# Patient Record
Sex: MALE | Race: WHITE | Employment: OTHER | URBAN - METROPOLITAN AREA
[De-identification: names, ages, dates, MRNs, and addresses within clinical notes are randomized per-mention and may not be internally consistent; named-entity substitution may affect disease eponyms.]

---

## 2021-12-03 DIAGNOSIS — G89.29 OTHER CHRONIC PAIN: Primary | ICD-10-CM

## 2021-12-03 RX ORDER — OXYCODONE HYDROCHLORIDE AND ACETAMINOPHEN 5; 325 MG/1; MG/1
1 TABLET ORAL EVERY 6 HOURS PRN
Qty: 12 TABLET | Refills: 0 | OUTPATIENT
Start: 2021-12-03 | End: 2021-12-06

## 2021-12-10 NOTE — TELEPHONE ENCOUNTER
This is a nursing home patient they dont go thru system and this pharmacy is nursing home only doesnot accept scrits

## 2022-11-23 ENCOUNTER — APPOINTMENT (OUTPATIENT)
Dept: GENERAL RADIOLOGY | Age: 77
DRG: 480 | End: 2022-11-23
Payer: MEDICARE

## 2022-11-23 ENCOUNTER — APPOINTMENT (OUTPATIENT)
Dept: CT IMAGING | Age: 77
DRG: 480 | End: 2022-11-23
Payer: MEDICARE

## 2022-11-23 ENCOUNTER — HOSPITAL ENCOUNTER (INPATIENT)
Age: 77
LOS: 12 days | Discharge: HOSPICE | DRG: 480 | End: 2022-12-05
Attending: EMERGENCY MEDICINE | Admitting: FAMILY MEDICINE
Payer: MEDICARE
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
## 2022-11-23 DIAGNOSIS — Z51.5 HOSPICE CARE PATIENT: ICD-10-CM

## 2022-11-23 DIAGNOSIS — S72.451A: Primary | ICD-10-CM

## 2022-11-23 PROBLEM — S72.8X1A OTHER FRACTURE OF RIGHT FEMUR, INITIAL ENCOUNTER FOR CLOSED FRACTURE (HCC): Status: ACTIVE | Noted: 2022-11-23

## 2022-11-23 PROCEDURE — 93005 ELECTROCARDIOGRAM TRACING: CPT | Performed by: FAMILY MEDICINE

## 2022-11-23 PROCEDURE — 99285 EMERGENCY DEPT VISIT HI MDM: CPT

## 2022-11-23 PROCEDURE — 96375 TX/PRO/DX INJ NEW DRUG ADDON: CPT

## 2022-11-23 PROCEDURE — 73552 X-RAY EXAM OF FEMUR 2/>: CPT

## 2022-11-23 PROCEDURE — 71045 X-RAY EXAM CHEST 1 VIEW: CPT

## 2022-11-23 PROCEDURE — 2580000003 HC RX 258: Performed by: EMERGENCY MEDICINE

## 2022-11-23 PROCEDURE — 96374 THER/PROPH/DIAG INJ IV PUSH: CPT

## 2022-11-23 PROCEDURE — 6360000002 HC RX W HCPCS: Performed by: EMERGENCY MEDICINE

## 2022-11-23 PROCEDURE — 1200000000 HC SEMI PRIVATE

## 2022-11-23 PROCEDURE — 73560 X-RAY EXAM OF KNEE 1 OR 2: CPT

## 2022-11-23 PROCEDURE — 73700 CT LOWER EXTREMITY W/O DYE: CPT

## 2022-11-23 RX ORDER — SULFAMETHOXAZOLE AND TRIMETHOPRIM 800; 160 MG/1; MG/1
1 TABLET ORAL 2 TIMES DAILY
Status: ON HOLD | COMMUNITY
End: 2022-12-05 | Stop reason: HOSPADM

## 2022-11-23 RX ORDER — OMEPRAZOLE 20 MG/1
20 CAPSULE, DELAYED RELEASE ORAL DAILY
Status: ON HOLD | COMMUNITY
End: 2022-12-05 | Stop reason: HOSPADM

## 2022-11-23 RX ORDER — VENLAFAXINE 50 MG/1
300 TABLET ORAL DAILY
Status: ON HOLD | COMMUNITY
End: 2022-12-05 | Stop reason: HOSPADM

## 2022-11-23 RX ORDER — FUROSEMIDE 20 MG/1
20 TABLET ORAL DAILY
Status: ON HOLD | COMMUNITY
End: 2022-12-05 | Stop reason: HOSPADM

## 2022-11-23 RX ORDER — FENTANYL CITRATE 50 UG/ML
25 INJECTION, SOLUTION INTRAMUSCULAR; INTRAVENOUS ONCE
Status: COMPLETED | OUTPATIENT
Start: 2022-11-23 | End: 2022-11-23

## 2022-11-23 RX ORDER — MORPHINE SULFATE 4 MG/ML
4 INJECTION, SOLUTION INTRAMUSCULAR; INTRAVENOUS ONCE
Status: DISCONTINUED | OUTPATIENT
Start: 2022-11-23 | End: 2022-11-23

## 2022-11-23 RX ORDER — ASPIRIN 325 MG
325 TABLET ORAL DAILY
Status: ON HOLD | COMMUNITY
End: 2022-11-25 | Stop reason: HOSPADM

## 2022-11-23 RX ORDER — ONDANSETRON 2 MG/ML
4 INJECTION INTRAMUSCULAR; INTRAVENOUS ONCE
Status: COMPLETED | OUTPATIENT
Start: 2022-11-23 | End: 2022-11-23

## 2022-11-23 RX ORDER — OXYCODONE HYDROCHLORIDE AND ACETAMINOPHEN 5; 325 MG/1; MG/1
1 TABLET ORAL EVERY 6 HOURS PRN
Status: ON HOLD | COMMUNITY
End: 2022-11-25 | Stop reason: HOSPADM

## 2022-11-23 RX ORDER — 0.9 % SODIUM CHLORIDE 0.9 %
1000 INTRAVENOUS SOLUTION INTRAVENOUS ONCE
Status: COMPLETED | OUTPATIENT
Start: 2022-11-23 | End: 2022-11-23

## 2022-11-23 RX ORDER — CLINDAMYCIN PHOSPHATE 900 MG/50ML
900 INJECTION INTRAVENOUS SEE ADMIN INSTRUCTIONS
Status: DISCONTINUED | OUTPATIENT
Start: 2022-11-23 | End: 2022-11-25 | Stop reason: SDUPTHER

## 2022-11-23 RX ORDER — ACETAMINOPHEN 325 MG/1
650 TABLET ORAL EVERY 4 HOURS PRN
Status: ON HOLD | COMMUNITY
End: 2022-11-25 | Stop reason: HOSPADM

## 2022-11-23 RX ORDER — CARVEDILOL 12.5 MG/1
12.5 TABLET ORAL DAILY
Status: ON HOLD | COMMUNITY
End: 2022-12-05 | Stop reason: HOSPADM

## 2022-11-23 RX ORDER — POTASSIUM CHLORIDE 750 MG/1
10 TABLET, EXTENDED RELEASE ORAL DAILY
Status: ON HOLD | COMMUNITY
End: 2022-12-05 | Stop reason: HOSPADM

## 2022-11-23 RX ORDER — LISINOPRIL 5 MG/1
5 TABLET ORAL DAILY
Status: ON HOLD | COMMUNITY
End: 2022-12-05 | Stop reason: HOSPADM

## 2022-11-23 RX ORDER — LAMOTRIGINE 200 MG/1
200 TABLET ORAL DAILY
Status: ON HOLD | COMMUNITY
End: 2022-12-05 | Stop reason: HOSPADM

## 2022-11-23 RX ADMIN — ONDANSETRON 4 MG: 2 INJECTION INTRAMUSCULAR; INTRAVENOUS at 21:26

## 2022-11-23 RX ADMIN — SODIUM CHLORIDE 1000 ML: 9 INJECTION, SOLUTION INTRAVENOUS at 21:36

## 2022-11-23 RX ADMIN — FENTANYL CITRATE 25 MCG: 50 INJECTION INTRAMUSCULAR; INTRAVENOUS at 21:35

## 2022-11-23 ASSESSMENT — PAIN DESCRIPTION - ORIENTATION
ORIENTATION: LEFT
ORIENTATION: LEFT
ORIENTATION: RIGHT

## 2022-11-23 ASSESSMENT — PAIN SCALES - GENERAL
PAINLEVEL_OUTOF10: 8
PAINLEVEL_OUTOF10: 7
PAINLEVEL_OUTOF10: 9

## 2022-11-23 ASSESSMENT — PAIN DESCRIPTION - LOCATION
LOCATION: LEG
LOCATION: LEG
LOCATION: KNEE

## 2022-11-23 ASSESSMENT — PAIN DESCRIPTION - DESCRIPTORS: DESCRIPTORS: ACHING

## 2022-11-23 ASSESSMENT — PAIN DESCRIPTION - FREQUENCY: FREQUENCY: CONTINUOUS

## 2022-11-23 ASSESSMENT — PAIN DESCRIPTION - PAIN TYPE: TYPE: ACUTE PAIN

## 2022-11-23 ASSESSMENT — PAIN DESCRIPTION - ONSET: ONSET: SUDDEN

## 2022-11-23 ASSESSMENT — PAIN - FUNCTIONAL ASSESSMENT: PAIN_FUNCTIONAL_ASSESSMENT: 0-10

## 2022-11-23 NOTE — LETTER
[place] Department Medicaid  CERTIFICATION OF NECESSITY  FOR NON-EMERGENCY TRANSPORTATION   BY GROUND AMBULANCE      Individual Information   1. Name: Lexus Hudson 2. [place] Medicaid Billing Number:    3. Address: [address] [address] [address] [address]      Transportation Provider Information   4. Provider Name: Ernesto Soto    5. [place] Provider Number:  National Provider Identifier (NPI):      Certification  7. Criteria:  During transport, this individual requires:  [x] Medical treatment or continuous     supervision by an EMT. [] The administration or regulation of oxygen by another person. [] Supervised protective restraint. 8. Period Beginning Date: 12/05/22   9. Length  [x] Not more than 1 day(s)  [] One Year     Additional Information Relevant to Certification   10. Comments or Explanations, If Necessary or Appropriate   Fracture right femur with repair/gait instability/ambulatory dysfunction/     Certifying Practitioner Information   11. Name of Practitioner: dr Mami Cr   12. [place] Medicaid Provider Number, If Applicable:  [address] Provider Identifier (NPI):      Signature Information   14. Date of Signature: 12/05/22 15. Name of Person Signing: Erika field rn     16. Signature and Professional Designation: Dr DELVIN Powell Discharge planner      [facility] [id number]  Rev. 7/2015       [address] [place] [address] Encounter Date/Time: 11/23/2022 2011    Hospital Account: [de-identified]    MRN: [id number]    Patient: Lexus Hudson    Contact Serial #: [id number]      ENCOUNTER          Patient Class: I Private Enc?   No Unit  BD: SEYZ [id number]   Hospital Service: MED   Encounter DX: Other fracture of right *   ADM Provider: Andrew Beltrán DO   Procedure:     ATT Provider: Bryan Shaffer MD   REF Provider:        Admission DX: Other fracture of right femur, initial encounter for closed fracture ([facility] [address] Fall from standing, initial encounter, Supracondyl fx femur-closed, right, [facility] and DX codes: S42.3D9K, [id number]. Virgil Pulliamtamy      PATIENT                 Name: Macie Lopes : 1945 (77 yrs)   Address: [address]  Sex: Male   [place] city: [facility] [address] [address]         Marital Status:    Employer: RETIRED         Christian: Sikhism   Primary Care Provider: Davey Dunn DO         Primary Phone: [phone]   EMERGENCY CONTACT   Contact Name Legal Guardian? Relationship to Patient Home Phone Work Phone   1. Dayana Smith  2. *No Contact Specified* No    Spouse                      GUARANTOR            Guarantor: Macie Lopes     : 1945   Address: [address]  Sex: Male     [place] [address]     Relation to Patient: Self       Home Phone: [phone]   Guarantor ID: [id number]       Work Phone:     Guarantor Employer: RETIRED         Status: RETIRED      COVERAGE        PRIMARY INSURANCE   Payor: AET MEDICARE Plan: [address] [place] [address]*   Payor Address: [facility] [id number], [place] [address]       Group Number: [id number] Insurance Type: [id number] Name: Syed Garland : 1945   Subscriber ID: [id number] Pat. Rel. to Sub: Self   SECONDARY INSURANCE   Payor:   Plan:     Payor Address:  ,           Group Number:   Insurance Type:     Subscriber Name:   Subscriber :     Subscriber ID:   Pat.  Rel. to Sub:           CSN: [id number]

## 2022-11-24 LAB
ABO/RH: NORMAL
ALBUMIN SERPL-MCNC: 3.4 G/DL (ref 3.5–5.2)
ALP BLD-CCNC: 100 U/L (ref 40–129)
ALT SERPL-CCNC: 19 U/L (ref 0–40)
ANION GAP SERPL CALCULATED.3IONS-SCNC: 10 MMOL/L (ref 7–16)
ANTIBODY SCREEN: NORMAL
APTT: 28.7 SEC (ref 24.5–35.1)
AST SERPL-CCNC: 19 U/L (ref 0–39)
BASOPHILS ABSOLUTE: 0.1 E9/L (ref 0–0.2)
BASOPHILS RELATIVE PERCENT: 0.9 % (ref 0–2)
BILIRUB SERPL-MCNC: 0.5 MG/DL (ref 0–1.2)
BUN BLDV-MCNC: 15 MG/DL (ref 6–23)
CALCIUM SERPL-MCNC: 9.2 MG/DL (ref 8.6–10.2)
CHLORIDE BLD-SCNC: 105 MMOL/L (ref 98–107)
CO2: 27 MMOL/L (ref 22–29)
CREAT SERPL-MCNC: 2 MG/DL (ref 0.7–1.2)
EOSINOPHILS ABSOLUTE: 0.1 E9/L (ref 0.05–0.5)
EOSINOPHILS RELATIVE PERCENT: 0.9 % (ref 0–6)
GFR SERPL CREATININE-BSD FRML MDRD: 33 ML/MIN/1.73
GLUCOSE BLD-MCNC: 200 MG/DL (ref 74–99)
HCT VFR BLD CALC: 42 % (ref 37–54)
HEMOGLOBIN: 13.2 G/DL (ref 12.5–16.5)
INR BLD: 1.2
LYMPHOCYTES ABSOLUTE: 1.62 E9/L (ref 1.5–4)
LYMPHOCYTES RELATIVE PERCENT: 14.8 % (ref 20–42)
MAGNESIUM: 2.3 MG/DL (ref 1.6–2.6)
MCH RBC QN AUTO: 27.7 PG (ref 26–35)
MCHC RBC AUTO-ENTMCNC: 31.4 % (ref 32–34.5)
MCV RBC AUTO: 88.1 FL (ref 80–99.9)
MONOCYTES ABSOLUTE: 1.4 E9/L (ref 0.1–0.95)
MONOCYTES RELATIVE PERCENT: 13 % (ref 2–12)
NEUTROPHILS ABSOLUTE: 7.56 E9/L (ref 1.8–7.3)
NEUTROPHILS RELATIVE PERCENT: 70.4 % (ref 43–80)
PDW BLD-RTO: 17.4 FL (ref 11.5–15)
PLATELET # BLD: 156 E9/L (ref 130–450)
PMV BLD AUTO: 11.3 FL (ref 7–12)
POTASSIUM REFLEX MAGNESIUM: 3.4 MMOL/L (ref 3.5–5)
PROTHROMBIN TIME: 12.6 SEC (ref 9.3–12.4)
RBC # BLD: 4.77 E12/L (ref 3.8–5.8)
SODIUM BLD-SCNC: 142 MMOL/L (ref 132–146)
TOTAL PROTEIN: 6.5 G/DL (ref 6.4–8.3)
WBC # BLD: 10.8 E9/L (ref 4.5–11.5)

## 2022-11-24 PROCEDURE — 1200000000 HC SEMI PRIVATE

## 2022-11-24 PROCEDURE — 85610 PROTHROMBIN TIME: CPT

## 2022-11-24 PROCEDURE — 6360000002 HC RX W HCPCS: Performed by: FAMILY MEDICINE

## 2022-11-24 PROCEDURE — 6370000000 HC RX 637 (ALT 250 FOR IP): Performed by: FAMILY MEDICINE

## 2022-11-24 PROCEDURE — 6370000000 HC RX 637 (ALT 250 FOR IP): Performed by: INTERNAL MEDICINE

## 2022-11-24 PROCEDURE — 83735 ASSAY OF MAGNESIUM: CPT

## 2022-11-24 PROCEDURE — 86901 BLOOD TYPING SEROLOGIC RH(D): CPT

## 2022-11-24 PROCEDURE — 86850 RBC ANTIBODY SCREEN: CPT

## 2022-11-24 PROCEDURE — 36415 COLL VENOUS BLD VENIPUNCTURE: CPT

## 2022-11-24 PROCEDURE — 86900 BLOOD TYPING SEROLOGIC ABO: CPT

## 2022-11-24 PROCEDURE — 2580000003 HC RX 258: Performed by: FAMILY MEDICINE

## 2022-11-24 PROCEDURE — 2700000000 HC OXYGEN THERAPY PER DAY

## 2022-11-24 PROCEDURE — 80053 COMPREHEN METABOLIC PANEL: CPT

## 2022-11-24 PROCEDURE — 85730 THROMBOPLASTIN TIME PARTIAL: CPT

## 2022-11-24 PROCEDURE — 85025 COMPLETE CBC W/AUTO DIFF WBC: CPT

## 2022-11-24 RX ORDER — TROSPIUM CHLORIDE 20 MG/1
20 TABLET, FILM COATED ORAL NIGHTLY
Status: DISCONTINUED | OUTPATIENT
Start: 2022-11-24 | End: 2022-12-05 | Stop reason: HOSPADM

## 2022-11-24 RX ORDER — POTASSIUM CHLORIDE 20 MEQ/1
20 TABLET, EXTENDED RELEASE ORAL 2 TIMES DAILY WITH MEALS
Status: DISCONTINUED | OUTPATIENT
Start: 2022-11-24 | End: 2022-12-05 | Stop reason: HOSPADM

## 2022-11-24 RX ORDER — FENTANYL CITRATE 50 UG/ML
50 INJECTION, SOLUTION INTRAMUSCULAR; INTRAVENOUS
Status: DISCONTINUED | OUTPATIENT
Start: 2022-11-24 | End: 2022-11-25

## 2022-11-24 RX ORDER — ONDANSETRON 2 MG/ML
4 INJECTION INTRAMUSCULAR; INTRAVENOUS EVERY 6 HOURS PRN
Status: DISCONTINUED | OUTPATIENT
Start: 2022-11-24 | End: 2022-12-01

## 2022-11-24 RX ORDER — SODIUM CHLORIDE 0.9 % (FLUSH) 0.9 %
10 SYRINGE (ML) INJECTION EVERY 12 HOURS SCHEDULED
Status: DISCONTINUED | OUTPATIENT
Start: 2022-11-24 | End: 2022-12-05 | Stop reason: HOSPADM

## 2022-11-24 RX ORDER — LAMOTRIGINE 100 MG/1
200 TABLET ORAL DAILY
Status: DISCONTINUED | OUTPATIENT
Start: 2022-11-24 | End: 2022-12-05 | Stop reason: HOSPADM

## 2022-11-24 RX ORDER — FUROSEMIDE 20 MG/1
20 TABLET ORAL DAILY
Status: DISCONTINUED | OUTPATIENT
Start: 2022-11-24 | End: 2022-12-05 | Stop reason: HOSPADM

## 2022-11-24 RX ORDER — SODIUM CHLORIDE 9 MG/ML
INJECTION, SOLUTION INTRAVENOUS PRN
Status: DISCONTINUED | OUTPATIENT
Start: 2022-11-24 | End: 2022-12-05 | Stop reason: HOSPADM

## 2022-11-24 RX ORDER — VENLAFAXINE 75 MG/1
300 TABLET ORAL DAILY
Status: DISCONTINUED | OUTPATIENT
Start: 2022-11-24 | End: 2022-12-05 | Stop reason: HOSPADM

## 2022-11-24 RX ORDER — POLYETHYLENE GLYCOL 3350 17 G/17G
17 POWDER, FOR SOLUTION ORAL DAILY PRN
Status: DISCONTINUED | OUTPATIENT
Start: 2022-11-24 | End: 2022-12-05 | Stop reason: HOSPADM

## 2022-11-24 RX ORDER — LISINOPRIL 5 MG/1
5 TABLET ORAL DAILY
Status: DISCONTINUED | OUTPATIENT
Start: 2022-11-24 | End: 2022-12-05 | Stop reason: HOSPADM

## 2022-11-24 RX ORDER — PANTOPRAZOLE SODIUM 40 MG/1
40 TABLET, DELAYED RELEASE ORAL
Status: DISCONTINUED | OUTPATIENT
Start: 2022-11-24 | End: 2022-12-05 | Stop reason: HOSPADM

## 2022-11-24 RX ORDER — ACETAMINOPHEN 325 MG/1
650 TABLET ORAL EVERY 6 HOURS PRN
Status: DISCONTINUED | OUTPATIENT
Start: 2022-11-24 | End: 2022-12-05 | Stop reason: HOSPADM

## 2022-11-24 RX ORDER — ACETAMINOPHEN 650 MG/1
650 SUPPOSITORY RECTAL EVERY 6 HOURS PRN
Status: DISCONTINUED | OUTPATIENT
Start: 2022-11-24 | End: 2022-12-05 | Stop reason: HOSPADM

## 2022-11-24 RX ORDER — SODIUM CHLORIDE 0.9 % (FLUSH) 0.9 %
10 SYRINGE (ML) INJECTION PRN
Status: DISCONTINUED | OUTPATIENT
Start: 2022-11-24 | End: 2022-12-05 | Stop reason: HOSPADM

## 2022-11-24 RX ORDER — CARVEDILOL 6.25 MG/1
12.5 TABLET ORAL DAILY
Status: DISCONTINUED | OUTPATIENT
Start: 2022-11-24 | End: 2022-12-05 | Stop reason: HOSPADM

## 2022-11-24 RX ORDER — PROMETHAZINE HYDROCHLORIDE 12.5 MG/1
12.5 TABLET ORAL EVERY 6 HOURS PRN
Status: DISCONTINUED | OUTPATIENT
Start: 2022-11-24 | End: 2022-12-01

## 2022-11-24 RX ORDER — OXYCODONE HYDROCHLORIDE AND ACETAMINOPHEN 5; 325 MG/1; MG/1
1 TABLET ORAL EVERY 6 HOURS PRN
Status: DISCONTINUED | OUTPATIENT
Start: 2022-11-24 | End: 2022-11-25 | Stop reason: SDUPTHER

## 2022-11-24 RX ORDER — SODIUM CHLORIDE 9 MG/ML
INJECTION, SOLUTION INTRAVENOUS CONTINUOUS
Status: DISCONTINUED | OUTPATIENT
Start: 2022-11-24 | End: 2022-11-30

## 2022-11-24 RX ADMIN — SODIUM CHLORIDE: 9 INJECTION, SOLUTION INTRAVENOUS at 01:41

## 2022-11-24 RX ADMIN — FENTANYL CITRATE 50 MCG: 50 INJECTION INTRAMUSCULAR; INTRAVENOUS at 04:04

## 2022-11-24 RX ADMIN — FENTANYL CITRATE 50 MCG: 50 INJECTION INTRAMUSCULAR; INTRAVENOUS at 20:05

## 2022-11-24 RX ADMIN — FENTANYL CITRATE 50 MCG: 50 INJECTION INTRAMUSCULAR; INTRAVENOUS at 01:34

## 2022-11-24 RX ADMIN — VENLAFAXINE 300 MG: 75 TABLET ORAL at 12:32

## 2022-11-24 RX ADMIN — TROSPIUM CHLORIDE 20 MG: 20 TABLET, FILM COATED ORAL at 20:06

## 2022-11-24 RX ADMIN — LAMOTRIGINE 200 MG: 100 TABLET ORAL at 08:07

## 2022-11-24 RX ADMIN — FUROSEMIDE 20 MG: 20 TABLET ORAL at 08:06

## 2022-11-24 RX ADMIN — SODIUM CHLORIDE, PRESERVATIVE FREE 10 ML: 5 INJECTION INTRAVENOUS at 20:06

## 2022-11-24 RX ADMIN — CARVEDILOL 12.5 MG: 6.25 TABLET, FILM COATED ORAL at 08:05

## 2022-11-24 RX ADMIN — OXYCODONE AND ACETAMINOPHEN 1 TABLET: 5; 325 TABLET ORAL at 15:03

## 2022-11-24 RX ADMIN — FENTANYL CITRATE 50 MCG: 50 INJECTION INTRAMUSCULAR; INTRAVENOUS at 06:15

## 2022-11-24 RX ADMIN — FENTANYL CITRATE 50 MCG: 50 INJECTION INTRAMUSCULAR; INTRAVENOUS at 16:21

## 2022-11-24 RX ADMIN — POTASSIUM CHLORIDE 20 MEQ: 20 TABLET, EXTENDED RELEASE ORAL at 15:03

## 2022-11-24 RX ADMIN — OXYCODONE AND ACETAMINOPHEN 1 TABLET: 5; 325 TABLET ORAL at 08:08

## 2022-11-24 RX ADMIN — POTASSIUM CHLORIDE 20 MEQ: 20 TABLET, EXTENDED RELEASE ORAL at 12:31

## 2022-11-24 RX ADMIN — LISINOPRIL 5 MG: 5 TABLET ORAL at 08:05

## 2022-11-24 RX ADMIN — PANTOPRAZOLE SODIUM 40 MG: 40 TABLET, DELAYED RELEASE ORAL at 06:17

## 2022-11-24 RX ADMIN — ACETAMINOPHEN 650 MG: 325 TABLET ORAL at 13:18

## 2022-11-24 ASSESSMENT — PAIN DESCRIPTION - LOCATION
LOCATION: KNEE
LOCATION: LEG
LOCATION: KNEE

## 2022-11-24 ASSESSMENT — PAIN DESCRIPTION - ORIENTATION
ORIENTATION: RIGHT

## 2022-11-24 ASSESSMENT — PAIN SCALES - GENERAL
PAINLEVEL_OUTOF10: 8
PAINLEVEL_OUTOF10: 10
PAINLEVEL_OUTOF10: 8
PAINLEVEL_OUTOF10: 10
PAINLEVEL_OUTOF10: 8
PAINLEVEL_OUTOF10: 9
PAINLEVEL_OUTOF10: 8
PAINLEVEL_OUTOF10: 9
PAINLEVEL_OUTOF10: 8

## 2022-11-24 ASSESSMENT — PAIN DESCRIPTION - DESCRIPTORS
DESCRIPTORS: STABBING
DESCRIPTORS: SPASM
DESCRIPTORS: ACHING;DISCOMFORT;SORE
DESCRIPTORS: ACHING;CRAMPING;SHARP
DESCRIPTORS: BURNING;CRAMPING;SHARP
DESCRIPTORS: SHARP

## 2022-11-24 ASSESSMENT — PAIN - FUNCTIONAL ASSESSMENT
PAIN_FUNCTIONAL_ASSESSMENT: PREVENTS OR INTERFERES WITH ALL ACTIVE AND SOME PASSIVE ACTIVITIES

## 2022-11-24 NOTE — PROGRESS NOTES
Department of Orthopedic Surgery  Resident Progress Note    Patient seen and examined. Pain controlled. No new complaints. Denies chest pain, shortness of breath, dizziness/lightheadedness. Spoke with patient about plans for the OR. Currently the operating room is STAT cases only due to the Thanksgiving holiday.   Patient made aware and is okay with treatment plan    VITALS:  /75   Pulse 85   Temp 97.3 °F (36.3 °C) (Temporal)   Resp 20   Ht 6' 2\" (1.88 m)   Wt 212 lb (96.2 kg)   SpO2 100%   BMI 27.22 kg/m²     General: alert and oriented to person, place and time, well-developed and well-nourished, in no acute distress    MUSCULOSKELETAL:   right lower extremity:  Dressing C/D/I, knee immobilizer in place  Compartments soft and compressible  +PF/DF/EHL  +2/4 DP & PT pulses, Brisk Cap refill, Toes warm and perfused  Distal sensation grossly intact to Peroneals, Sural, Saphenous, and tibial nrs    CBC:   Lab Results   Component Value Date/Time    WBC 10.8 11/24/2022 05:28 AM    HGB 13.2 11/24/2022 05:28 AM    HCT 42.0 11/24/2022 05:28 AM     11/24/2022 05:28 AM     PT/INR:    Lab Results   Component Value Date/Time    PROTIME 12.6 11/24/2022 12:05 AM    INR 1.2 11/24/2022 12:05 AM           ASSESSMENT  Closed, right periprosthetic distal femur fracture    PLAN      Continue physical therapy and protocol: NWCLAUDIA - BECKY  Hold anti-coags  Plan for OR tomorrow, 11/25  Ice and elevate affected extremity  Pain Control: IV and PO  Monitor H&H  D/C Plan:  Pending

## 2022-11-24 NOTE — PROGRESS NOTES
Hospitalist Progress Note      SYNOPSIS: Patient admitted on 2022 for right knee pain following a fall from standing height. He was a transfer from outside hospital.    Patient is a 76 y.o. male with a past medical history of hypertension, depression, osteoporosis who presents with the chief complaint of right knee pain following a fall from standing height. Patient states that he was taking a step up from his living room to his kitchen and as he went to take a step he felt his knee buckle fell to the ground. Denies any his head denies loss of consciousness. He states he had immediate onset pain to the right knee and subsequently sought medical treatment at [facility]. Was found to have a periprosthetic distal femur fracture. Transferred to [facility] for definitive care and treatment. SUBJECTIVE:    Patient seen and examined in his room. Appears comfortable. States that pain is well controlled. Denies any chest pain, nausea, vomiting. Records reviewed. Stable overnight. No other overnight issues reported. Temp (24hrs), Av.9 °F (36.6 °C), Min:97.3 °F (36.3 °C), Max:98.5 °F (36.9 °C)    DIET: Diet NPO  CODE: Full Code    Intake/Output Summary (Last 24 hours) at 2022 8572  Last data filed at 2022 2303  Gross per 24 hour   Intake 1000 ml   Output --   Net 1000 ml       OBJECTIVE:    /75   Pulse 85   Temp 97.3 °F (36.3 °C) (Temporal)   Resp 20   Ht 6' 2\" (1.88 m)   Wt 212 lb (96.2 kg)   SpO2 100%   BMI 27.22 kg/m²     General appearance: No apparent distress, appears stated age and cooperative. HEENT:  Conjunctivae/corneas clear. Neck: Supple. No jugular venous distention.    Respiratory: Clear to auscultation bilaterally, normal respiratory effort  Cardiovascular: Regular rate rhythm, normal S1-S2  Abdomen: Soft, nontender, nondistended  Musculoskeletal: No clubbing, cyanosis, no bilateral lower extremity edema. Brisk capillary refill. Skin:  No rashes  on visible skin  Neurologic: awake, alert and following commands     ASSESSMENT & PLAN:    ASSESSMENT:  -closed right periprosthetic distal femur fracture  -Hypertension  -Hyperlipidemia  -GERD  -Elevated creatinine, likely CKD, monitor        PLAN:  -Orthopedics following  -N.p.o. on the night of 11/24, plan for OR on 11/25  -Normal saline 75 mL/h  -Pain management  -Continue home medications          DISPOSITION:   Unclear discharge disposition, will be decided after the surgery and PT/OT evaluation. Medications:  REVIEWED DAILY    Infusion Medications    sodium chloride      sodium chloride 75 mL/hr at 11/24/22 0141     Scheduled Medications    carvedilol  12.5 mg Oral Daily    furosemide  20 mg Oral Daily    lamoTRIgine  200 mg Oral Daily    lisinopril  5 mg Oral Daily    pantoprazole  40 mg Oral QAM AC    venlafaxine  300 mg Oral Daily    trospium  20 mg Oral Nightly    sodium chloride flush  10 mL IntraVENous 2 times per day    clindamycin (CLEOCIN) IV  900 mg IntraVENous See Admin Instructions     PRN Meds: fentanNYL, oxyCODONE-acetaminophen, sodium chloride flush, sodium chloride, promethazine **OR** ondansetron, polyethylene glycol, acetaminophen **OR** acetaminophen    Labs:     Recent Labs     11/24/22  0528   WBC 10.8   HGB 13.2   HCT 42.0          Recent Labs     11/24/22  0528      K 3.4*      CO2 27   BUN 15   CREATININE 2.0*   CALCIUM 9.2       Recent Labs     11/24/22  0528   PROT 6.5   ALKPHOS 100   ALT 19   AST 19   BILITOT 0.5       Recent Labs     11/24/22  0005   INR 1.2       No results for input(s): Dena Quevedo in the last 72 hours.     Chronic labs:    Lab Results   Component Value Date    INR 1.2 11/24/2022       Radiology: REVIEWED DAILY    +++++++++++++++++++++++++++++++++++++++++++++++++  MD Toni King Physician - [address], [place]  +++++++++++++++++++++++++++++++++++++++++++++++++  NOTE: This report was transcribed using voice recognition software. Every effort was made to ensure accuracy; however, inadvertent computerized transcription errors may be present.

## 2022-11-24 NOTE — H&P
Hospitalist History & Physical      PCP: Ángel Mina DO    Date of Service: Pt seen/examined on 11/23/2022     Chief Complaint:  had concerns including Knee Pain (Transfer from [place]. Right knee gave out earlier. Knee swollen. Xray done distal femur fx, arrived with knee immobilizer on). History Of Present Illness:    Mr. Rock Jane, a 76y.o. year old male  who  has no past medical history on file. Patient is a 76 y.o. male with a past medical history of hypertension, depression, osteoporosis who presents with the chief complaint of right knee pain following a fall from standing height. Patient states that he was taking a step up from his living room to his kitchen and as he went to take a step he felt his knee buckle fell to the ground. Denies any his head denies loss of consciousness. He states he had immediate onset pain to the right knee and subsequently sought medical treatment at [facility]. Was found to have a periprosthetic distal femur fracture. Transferred to [facility] for definitive care and treatment. No past medical history on file. No past surgical history on file. Prior to Admission medications    Medication Sig Start Date End Date Taking?  Authorizing Provider   carvedilol (COREG) 12.5 MG tablet Take 12.5 mg by mouth daily   Yes Historical Provider, MD   omeprazole (PRILOSEC) 20 MG delayed release capsule Take 20 mg by mouth daily   Yes Historical Provider, MD   venlafaxine (EFFEXOR) 50 MG tablet Take 300 mg by mouth daily   Yes Historical Provider, MD   lisinopril (PRINIVIL;ZESTRIL) 5 MG tablet Take 5 mg by mouth daily   Yes Historical Provider, MD   furosemide (LASIX) 20 MG tablet Take 20 mg by mouth daily   Yes Historical Provider, MD   potassium chloride (KLOR-CON M) 10 MEQ extended release tablet Take 10 mEq by mouth daily   Yes Historical Provider, MD   acetaminophen (TYLENOL) 325 MG tablet Take 650 mg by mouth every 4 hours as needed for Pain   Yes Historical Provider, MD   aspirin 325 MG tablet Take 325 mg by mouth daily   Yes Historical Provider, MD   oxyCODONE-acetaminophen (PERCOCET) 5-325 MG per tablet Take 1 tablet by mouth every 6 hours as needed for Pain. Yes Historical Provider, MD   sulfamethoxazole-trimethoprim (BACTRIM DS;SEPTRA DS) 800-160 MG per tablet Take 1 tablet by mouth 2 times daily End date 11-23-22   Yes Historical Provider, MD   Vibegron 75 MG TABS Take 75 mg by mouth daily   Yes Historical Provider, MD   lamoTRIgine (LAMICTAL) 200 MG tablet Take 200 mg by mouth daily   Yes Historical Provider, MD         Allergies:  Ceftriaxone    Social History:    TOBACCO:   reports that he has an unknown smoking status. He has never used smokeless tobacco.  ETOH:   has no history on file for alcohol use. Family History:    Reviewed in detail and negative for DM, CAD, Cancer, CVA. Positive as follows\"  No family history on file. REVIEW OF SYSTEMS:   Pertinent positives as noted in the HPI. All other systems reviewed and negative. PHYSICAL EXAM:  /78   Pulse 88   Temp 98.5 °F (36.9 °C) (Oral)   Resp 18   Ht 6' 2\" (1.88 m)   Wt 212 lb (96.2 kg)   SpO2 95%   BMI 27.22 kg/m²   General appearance: No apparent distress, appears stated age and cooperative. HEENT: Normal cephalic, atraumatic without obvious deformity. Pupils equal, round, and reactive to light. Extra ocular muscles intact. Conjunctivae/corneas clear. Neck: Supple, with full range of motion. No jugular venous distention. Trachea midline. Respiratory: CTA  Cardiovascular: RRR  Abdomen: S/NT/ND  Musculoskeletal: No clubbing, cyanosis, edema of bilateral lower extremities. Brisk capillary refill. Knee immobilizer in place on right knee  Skin: Normal skin color. No rashes or lesions. Neurologic:  Neurovascularly intact without any focal sensory/motor deficits.  Cranial nerves: II-XII intact, grossly non-focal.  Psychiatric: Alert and oriented, thought content appropriate, normal insight    Reviewed EKG and CXR personally      CBC:   No results for input(s): WBC, RBC, HGB, HCT, MCV, RDW, PLT in the last 72 hours. BMP: No results for input(s): NA, K, CL, CO2, BUN, CREATININE, CA, MG, PHOS in the last 72 hours. LFT:  No results for input(s): PROT, ALB, ALKPHOS, ALT, AST, BILITOT, AMYLASE, LIPASE in the last 72 hours. CE:  No results for input(s): Ian Bigness in the last 72 hours. PT/INR: No results for input(s): INR, APTT in the last 72 hours. BNP: No results for input(s): BNP in the last 72 hours. ESR: No results found for: SEDRATE  CRP: No results found for: CRP  D Dimer: No results found for: DDIMER   Folate and B12: No results found for: [id number], No results found for: FOLATE  Lactic Acid: No results found for: LACTA  Thyroid Studies: No results found for: TSH, W2LXGTK, M8MLWCF, THYROIDAB    Oupatient labs:  No results found for: CHOL, TRIG, HDL, LDLCALC, TSH, PSA, INR, LABA1C    Urinalysis:  No results found for: NITRU, WBCUA, BACTERIA, RBCUA, BLOODU, SPECGRAV, GLUCOSEU    Imaging:  XR FEMUR RIGHT (MIN 2 VIEWS)    Result Date: 11/23/2022  EXAMINATION: XRAY VIEWS OF THE RIGHT FEMUR 11/23/2022 8:49 pm COMPARISON: None. HISTORY: ORDERING SYSTEM PROVIDED HISTORY: Fall from standing, reported distal periprosthetic femur fracture TECHNOLOGIST PROVIDED HISTORY: Reason for exam:->Fall from standing, reported distal periprosthetic femur fracture What reading provider will be dictating this exam?->CRC FINDINGS: Supracondylar fracture with apparent angulation and impaction is at the femur along the superior margin of knee replacement. Bone mineral density appears low. Hip replacement present. Angulated and impacted supracondylar fracture. XR KNEE RIGHT (1-2 VIEWS)    Result Date: 11/23/2022  EXAMINATION: TWO XRAY VIEWS OF THE RIGHT KNEE 11/23/2022 8:49 pm COMPARISON: None.  HISTORY: ORDERING SYSTEM PROVIDED HISTORY: periprosthetic distal femur fracture TECHNOLOGIST PROVIDED HISTORY: Reason for exam:->periprosthetic distal femur fracture What reading provider will be dictating this exam?->CRC FINDINGS: Supracondylar fracture with apparent angulation and impaction is at the femur along the superior margin of knee replacement. Bone mineral density appears low. Angulated and impacted supracondylar fracture. ASSESSMENT:  -Right femur fracture  -Hypertension  -Hyperlipidemia  -GERD      PLAN:  -Admit to medicine  -Orthopedics following  -N.p.o.  -Normal saline 75 mL/h  -Pain management  -Get EKG  -Continue home medications        Diet: No diet orders on file  Code Status: No Order  Surrogate decision maker confirmed with patient:   Extended Emergency Contact Information  Primary Emergency Contact: Dayana Smith  Mobile Phone: [phone]  Relation: Spouse  Preferred language: English   needed? No    DVT Prophylaxis: []Lovenox []Heparin []PCD [] [address] []Encouraged ambulation  Disposition: []Med/Surg [] Intermediate [] ICU/CCU  Admit status: [] Observation [] Inpatient     +++++++++++++++++++++++++++++++++++++++++++++++++  Katlyn Mantle, DO  +++++++++++++++++++++++++++++++++++++++++++++++++  NOTE: This report was transcribed using voice recognition software. Every effort was made to ensure accuracy; however, inadvertent computerized transcription errors may be present.

## 2022-11-24 NOTE — ED NOTES
Patient returned from [address] [place] [address],[facility]  11/23/22 0401

## 2022-11-24 NOTE — ED NOTES
Assumed care of pt at this time; when this nurse entered room patient had a cup of ice chips in his hand; connected to bp cuff and placed on monitor at this time; cws wnl to right leg; call bell in reach; will monitor     Alena Evans RN  11/23/22 2047       Alena Evans RN  11/23/22 2256

## 2022-11-24 NOTE — ED NOTES
Made physician aware of low bp and need to change pain medications     Aurora Breen RN  11/23/22 7767

## 2022-11-24 NOTE — CONSULTS
chills  EYES:  negative for acute vision changes  HEENT:  negative for cough, hearing loss  RESPIRATORY:  negative for  dyspnea, wheezing  CARDIOVASCULAR:  negative for  chest pain  GASTROINTESTINAL:  negative for nausea, vomiting  GENITOURINARY:  negative for frequency, positive for urinary incontinence  HEMATOLOGIC/LYMPHATIC:  negative for bleeding  MUSCULOSKELETAL:  positive for right knee pain and swelling  NEUROLOGICAL:  negative for headaches, dizziness  BEHAVIOR/PSYCH:  negative for increased agitation and anxiety    PHYSICAL EXAM:    VITALS:  There were no vitals taken for this visit. CONSTITUTIONAL:  awake, alert, cooperative, no apparent distress, and appears stated age  MUSCULOSKELETAL:  Right lower Extremity:  Skin examination reveals no superficial lacerations or abrasions. Large effusion noted to the right knee. No gross angular deformity noted about the right lower extremity. No erythema or ecchymosis at this time. TTP globally about the knee and distal femur. No tenderness palpation about the remainder of the right lower extremity. Sensation intact light touch distally in sural, saphenous, tibial, deep and superficial peroneal nerve distributions. Motor function grossly intact distally in tibial, deep and supers peroneal nerve distributions. Compartment soft and compressible  +2/4 DP and PT pulses, foot warm well fused, brisk capillary refill    Secondary Exam:   bilateralUE: No obvious signs of trauma. -TTP to fingers, hand, wrist, forearm, elbow, humerus, shoulder or clavicle. leftLE: No obvious signs of trauma. -TTP to foot, ankle, leg, knee, thigh, hip. Pelvis: -TTP, -Log roll, -Heel strike     DATA:    CBC: No results found for: WBC, RBC, HGB, HCT, MCV, MCH, MCHC, RDW, PLT, MPV  PT/INR:  No results found for: PROTIME, INR    Radiology Review:  XR 4 views of the right femur reviewed demonstrating a periprosthetic distal femur fracture.   Fracture line is oblique in nature extending from proximal to the anterior flange to just distal to the anterior flange. No significant comminution noted. CT scan of the right femur reviewed demonstrating the previously mentioned fracture pattern. No significant comminution is visualized. The implant does appear to be stable. Without any evidence of lucency about or tibial components. IMPRESSION:  Closed, right periprosthetic distal femur fracture    PLAN:  Nonweightbearing right lower extremity, maintain well-padded knee immobilizer  Multimodal pain control  N.p.o. midnight tomorrow, 11/25  Hold anticoagulants  Preoperative labs and imaging  Appreciate wright perioperative medical optimization by the medical team  Plan for open reduction internal fixation of right periprosthetic distal femur fracture Friday 11/25  All patient/family questions have been answered and patient is currently agreeable to plan.   Discussed with Attending      Electronically signed by Travis Mandujano DO on 11/23/2022 at 8:40 PM

## 2022-11-25 ENCOUNTER — ANESTHESIA EVENT (OUTPATIENT)
Dept: OPERATING ROOM | Age: 77
End: 2022-11-25
Payer: MEDICARE

## 2022-11-25 ENCOUNTER — APPOINTMENT (OUTPATIENT)
Dept: GENERAL RADIOLOGY | Age: 77
DRG: 480 | End: 2022-11-25
Payer: MEDICARE

## 2022-11-25 ENCOUNTER — ANESTHESIA (OUTPATIENT)
Dept: OPERATING ROOM | Age: 77
End: 2022-11-25
Payer: MEDICARE

## 2022-11-25 PROBLEM — S72.451A: Status: ACTIVE | Noted: 2022-11-25

## 2022-11-25 LAB
ANION GAP SERPL CALCULATED.3IONS-SCNC: 13 MMOL/L (ref 7–16)
BUN BLDV-MCNC: 21 MG/DL (ref 6–23)
CALCIUM SERPL-MCNC: 9.1 MG/DL (ref 8.6–10.2)
CHLORIDE BLD-SCNC: 102 MMOL/L (ref 98–107)
CO2: 24 MMOL/L (ref 22–29)
CREAT SERPL-MCNC: 2.6 MG/DL (ref 0.7–1.2)
EKG ATRIAL RATE: 68 BPM
EKG P AXIS: 94 DEGREES
EKG P-R INTERVAL: 204 MS
EKG Q-T INTERVAL: 464 MS
EKG QRS DURATION: 92 MS
EKG QTC CALCULATION (BAZETT): 493 MS
EKG R AXIS: 53 DEGREES
EKG T AXIS: 92 DEGREES
EKG VENTRICULAR RATE: 68 BPM
GFR SERPL CREATININE-BSD FRML MDRD: 24 ML/MIN/1.73
GLUCOSE BLD-MCNC: 160 MG/DL (ref 74–99)
HCT VFR BLD CALC: 38.1 % (ref 37–54)
HEMOGLOBIN: 12.4 G/DL (ref 12.5–16.5)
MCH RBC QN AUTO: 28.2 PG (ref 26–35)
MCHC RBC AUTO-ENTMCNC: 32.5 % (ref 32–34.5)
MCV RBC AUTO: 86.8 FL (ref 80–99.9)
PDW BLD-RTO: 17.4 FL (ref 11.5–15)
PLATELET # BLD: 129 E9/L (ref 130–450)
PMV BLD AUTO: 11.6 FL (ref 7–12)
POTASSIUM SERPL-SCNC: 4.2 MMOL/L (ref 3.5–5)
RBC # BLD: 4.39 E12/L (ref 3.8–5.8)
SODIUM BLD-SCNC: 139 MMOL/L (ref 132–146)
WBC # BLD: 14.2 E9/L (ref 4.5–11.5)

## 2022-11-25 PROCEDURE — 6370000000 HC RX 637 (ALT 250 FOR IP)

## 2022-11-25 PROCEDURE — 6370000000 HC RX 637 (ALT 250 FOR IP): Performed by: FAMILY MEDICINE

## 2022-11-25 PROCEDURE — 73552 X-RAY EXAM OF FEMUR 2/>: CPT

## 2022-11-25 PROCEDURE — 3600000005 HC SURGERY LEVEL 5 BASE: Performed by: STUDENT IN AN ORGANIZED HEALTH CARE EDUCATION/TRAINING PROGRAM

## 2022-11-25 PROCEDURE — C1776 JOINT DEVICE (IMPLANTABLE): HCPCS | Performed by: STUDENT IN AN ORGANIZED HEALTH CARE EDUCATION/TRAINING PROGRAM

## 2022-11-25 PROCEDURE — 2580000003 HC RX 258: Performed by: PHYSICAL THERAPY ASSISTANT

## 2022-11-25 PROCEDURE — C1769 GUIDE WIRE: HCPCS | Performed by: STUDENT IN AN ORGANIZED HEALTH CARE EDUCATION/TRAINING PROGRAM

## 2022-11-25 PROCEDURE — 3600000015 HC SURGERY LEVEL 5 ADDTL 15MIN: Performed by: STUDENT IN AN ORGANIZED HEALTH CARE EDUCATION/TRAINING PROGRAM

## 2022-11-25 PROCEDURE — 6370000000 HC RX 637 (ALT 250 FOR IP): Performed by: PHYSICAL THERAPY ASSISTANT

## 2022-11-25 PROCEDURE — 6360000002 HC RX W HCPCS: Performed by: ANESTHESIOLOGY

## 2022-11-25 PROCEDURE — 2700000000 HC OXYGEN THERAPY PER DAY

## 2022-11-25 PROCEDURE — 93010 ELECTROCARDIOGRAM REPORT: CPT | Performed by: INTERNAL MEDICINE

## 2022-11-25 PROCEDURE — 2500000003 HC RX 250 WO HCPCS

## 2022-11-25 PROCEDURE — 2500000003 HC RX 250 WO HCPCS: Performed by: PHYSICAL THERAPY ASSISTANT

## 2022-11-25 PROCEDURE — 6360000002 HC RX W HCPCS

## 2022-11-25 PROCEDURE — 6360000002 HC RX W HCPCS: Performed by: FAMILY MEDICINE

## 2022-11-25 PROCEDURE — 6370000000 HC RX 637 (ALT 250 FOR IP): Performed by: STUDENT IN AN ORGANIZED HEALTH CARE EDUCATION/TRAINING PROGRAM

## 2022-11-25 PROCEDURE — 1200000000 HC SEMI PRIVATE

## 2022-11-25 PROCEDURE — 2580000003 HC RX 258: Performed by: FAMILY MEDICINE

## 2022-11-25 PROCEDURE — 3700000000 HC ANESTHESIA ATTENDED CARE: Performed by: STUDENT IN AN ORGANIZED HEALTH CARE EDUCATION/TRAINING PROGRAM

## 2022-11-25 PROCEDURE — 2500000003 HC RX 250 WO HCPCS: Performed by: STUDENT IN AN ORGANIZED HEALTH CARE EDUCATION/TRAINING PROGRAM

## 2022-11-25 PROCEDURE — 3700000001 HC ADD 15 MINUTES (ANESTHESIA): Performed by: STUDENT IN AN ORGANIZED HEALTH CARE EDUCATION/TRAINING PROGRAM

## 2022-11-25 PROCEDURE — 0QSB04Z REPOSITION RIGHT LOWER FEMUR WITH INTERNAL FIXATION DEVICE, OPEN APPROACH: ICD-10-PCS | Performed by: STUDENT IN AN ORGANIZED HEALTH CARE EDUCATION/TRAINING PROGRAM

## 2022-11-25 PROCEDURE — 7100000000 HC PACU RECOVERY - FIRST 15 MIN: Performed by: STUDENT IN AN ORGANIZED HEALTH CARE EDUCATION/TRAINING PROGRAM

## 2022-11-25 PROCEDURE — 85027 COMPLETE CBC AUTOMATED: CPT

## 2022-11-25 PROCEDURE — 80048 BASIC METABOLIC PNL TOTAL CA: CPT

## 2022-11-25 PROCEDURE — 3209999900 FLUORO FOR SURGICAL PROCEDURES

## 2022-11-25 PROCEDURE — 2720000010 HC SURG SUPPLY STERILE: Performed by: STUDENT IN AN ORGANIZED HEALTH CARE EDUCATION/TRAINING PROGRAM

## 2022-11-25 PROCEDURE — C1713 ANCHOR/SCREW BN/BN,TIS/BN: HCPCS | Performed by: STUDENT IN AN ORGANIZED HEALTH CARE EDUCATION/TRAINING PROGRAM

## 2022-11-25 PROCEDURE — 36415 COLL VENOUS BLD VENIPUNCTURE: CPT

## 2022-11-25 PROCEDURE — 6360000002 HC RX W HCPCS: Performed by: PHYSICAL THERAPY ASSISTANT

## 2022-11-25 PROCEDURE — 2709999900 HC NON-CHARGEABLE SUPPLY: Performed by: STUDENT IN AN ORGANIZED HEALTH CARE EDUCATION/TRAINING PROGRAM

## 2022-11-25 PROCEDURE — 2500000003 HC RX 250 WO HCPCS: Performed by: ANESTHESIOLOGY

## 2022-11-25 PROCEDURE — 7100000001 HC PACU RECOVERY - ADDTL 15 MIN: Performed by: STUDENT IN AN ORGANIZED HEALTH CARE EDUCATION/TRAINING PROGRAM

## 2022-11-25 DEVICE — IMPLANTABLE DEVICE: Type: IMPLANTABLE DEVICE | Site: KNEE | Status: FUNCTIONAL

## 2022-11-25 DEVICE — SCREW BNE L90MM DIA5MM CNDYL S STL ST VAR ANG LOK FULL THRD: Type: IMPLANTABLE DEVICE | Site: KNEE | Status: FUNCTIONAL

## 2022-11-25 DEVICE — SCREW BNE L80MM DIA5MM CNDYL S STL ST VAR ANG LOK T25: Type: IMPLANTABLE DEVICE | Site: KNEE | Status: FUNCTIONAL

## 2022-11-25 DEVICE — SCREW BNE L36MM DIA5MM CNDYL S STL ST VAR ANG LOK COMPR T25: Type: IMPLANTABLE DEVICE | Site: KNEE | Status: FUNCTIONAL

## 2022-11-25 DEVICE — CABLE SURG DIA1.7MM S STL HA CERCLAGE W/ CRMP 29880101S] DEPUY SYNTHES USA]: Type: IMPLANTABLE DEVICE | Site: KNEE | Status: FUNCTIONAL

## 2022-11-25 DEVICE — SCREW BNE L10MM DIA5MM UNICORTICAL PERIPROSTHETIC S STL ST: Type: IMPLANTABLE DEVICE | Site: KNEE | Status: FUNCTIONAL

## 2022-11-25 RX ORDER — VASOPRESSIN 20 U/ML
INJECTION PARENTERAL PRN
Status: DISCONTINUED | OUTPATIENT
Start: 2022-11-25 | End: 2022-11-25 | Stop reason: SDUPTHER

## 2022-11-25 RX ORDER — ASPIRIN 81 MG/1
81 TABLET ORAL 2 TIMES DAILY
Qty: 56 TABLET | Refills: 0 | Status: SHIPPED | OUTPATIENT
Start: 2022-11-25 | End: 2022-11-26 | Stop reason: HOSPADM

## 2022-11-25 RX ORDER — IPRATROPIUM BROMIDE AND ALBUTEROL SULFATE 2.5; .5 MG/3ML; MG/3ML
1 SOLUTION RESPIRATORY (INHALATION)
Status: DISCONTINUED | OUTPATIENT
Start: 2022-11-25 | End: 2022-11-25 | Stop reason: HOSPADM

## 2022-11-25 RX ORDER — SODIUM CHLORIDE 0.9 % (FLUSH) 0.9 %
5-40 SYRINGE (ML) INJECTION EVERY 12 HOURS SCHEDULED
Status: DISCONTINUED | OUTPATIENT
Start: 2022-11-25 | End: 2022-11-25 | Stop reason: HOSPADM

## 2022-11-25 RX ORDER — MIDAZOLAM HYDROCHLORIDE 2 MG/2ML
2 INJECTION, SOLUTION INTRAMUSCULAR; INTRAVENOUS
Status: DISCONTINUED | OUTPATIENT
Start: 2022-11-25 | End: 2022-11-25 | Stop reason: HOSPADM

## 2022-11-25 RX ORDER — OXYCODONE HYDROCHLORIDE 5 MG/1
5 TABLET ORAL EVERY 4 HOURS PRN
Status: DISCONTINUED | OUTPATIENT
Start: 2022-11-25 | End: 2022-12-05 | Stop reason: HOSPADM

## 2022-11-25 RX ORDER — LABETALOL HYDROCHLORIDE 5 MG/ML
10 INJECTION, SOLUTION INTRAVENOUS
Status: DISCONTINUED | OUTPATIENT
Start: 2022-11-25 | End: 2022-11-25 | Stop reason: HOSPADM

## 2022-11-25 RX ORDER — FENTANYL CITRATE 50 UG/ML
INJECTION, SOLUTION INTRAMUSCULAR; INTRAVENOUS PRN
Status: DISCONTINUED | OUTPATIENT
Start: 2022-11-25 | End: 2022-11-25 | Stop reason: SDUPTHER

## 2022-11-25 RX ORDER — BUPIVACAINE HYDROCHLORIDE 7.5 MG/ML
INJECTION, SOLUTION INTRASPINAL
Status: COMPLETED | OUTPATIENT
Start: 2022-11-25 | End: 2022-11-25

## 2022-11-25 RX ORDER — SODIUM CHLORIDE 0.9 % (FLUSH) 0.9 %
5-40 SYRINGE (ML) INJECTION PRN
Status: DISCONTINUED | OUTPATIENT
Start: 2022-11-25 | End: 2022-11-25 | Stop reason: HOSPADM

## 2022-11-25 RX ORDER — OXYCODONE HYDROCHLORIDE 10 MG/1
10 TABLET ORAL EVERY 4 HOURS PRN
Status: DISCONTINUED | OUTPATIENT
Start: 2022-11-25 | End: 2022-12-05 | Stop reason: HOSPADM

## 2022-11-25 RX ORDER — EPHEDRINE SULFATE/0.9% NACL/PF 50 MG/5 ML
SYRINGE (ML) INTRAVENOUS PRN
Status: DISCONTINUED | OUTPATIENT
Start: 2022-11-25 | End: 2022-11-25 | Stop reason: SDUPTHER

## 2022-11-25 RX ORDER — PROPOFOL 10 MG/ML
INJECTION, EMULSION INTRAVENOUS PRN
Status: DISCONTINUED | OUTPATIENT
Start: 2022-11-25 | End: 2022-11-25 | Stop reason: SDUPTHER

## 2022-11-25 RX ORDER — GLYCOPYRROLATE 0.2 MG/ML
INJECTION INTRAMUSCULAR; INTRAVENOUS PRN
Status: DISCONTINUED | OUTPATIENT
Start: 2022-11-25 | End: 2022-11-25 | Stop reason: SDUPTHER

## 2022-11-25 RX ORDER — MORPHINE SULFATE 4 MG/ML
4 INJECTION, SOLUTION INTRAMUSCULAR; INTRAVENOUS EVERY 4 HOURS PRN
Status: DISCONTINUED | OUTPATIENT
Start: 2022-11-25 | End: 2022-11-25

## 2022-11-25 RX ORDER — CLINDAMYCIN PHOSPHATE 900 MG/50ML
900 INJECTION INTRAVENOUS EVERY 8 HOURS
Status: COMPLETED | OUTPATIENT
Start: 2022-11-25 | End: 2022-11-26

## 2022-11-25 RX ORDER — MORPHINE SULFATE 4 MG/ML
4 INJECTION, SOLUTION INTRAMUSCULAR; INTRAVENOUS
Status: DISCONTINUED | OUTPATIENT
Start: 2022-11-25 | End: 2022-11-25

## 2022-11-25 RX ORDER — OXYCODONE HYDROCHLORIDE AND ACETAMINOPHEN 5; 325 MG/1; MG/1
1 TABLET ORAL EVERY 6 HOURS PRN
Qty: 28 TABLET | Refills: 0 | Status: SHIPPED | OUTPATIENT
Start: 2022-11-25 | End: 2022-12-02

## 2022-11-25 RX ORDER — SODIUM CHLORIDE 9 MG/ML
25 INJECTION, SOLUTION INTRAVENOUS PRN
Status: DISCONTINUED | OUTPATIENT
Start: 2022-11-25 | End: 2022-11-25 | Stop reason: HOSPADM

## 2022-11-25 RX ORDER — ONDANSETRON 2 MG/ML
4 INJECTION INTRAMUSCULAR; INTRAVENOUS
Status: DISCONTINUED | OUTPATIENT
Start: 2022-11-25 | End: 2022-11-25 | Stop reason: HOSPADM

## 2022-11-25 RX ORDER — METHOCARBAMOL 750 MG/1
750 TABLET, FILM COATED ORAL 4 TIMES DAILY
Status: DISCONTINUED | OUTPATIENT
Start: 2022-11-25 | End: 2022-12-05 | Stop reason: HOSPADM

## 2022-11-25 RX ORDER — HYDRALAZINE HYDROCHLORIDE 20 MG/ML
10 INJECTION INTRAMUSCULAR; INTRAVENOUS
Status: DISCONTINUED | OUTPATIENT
Start: 2022-11-25 | End: 2022-11-25 | Stop reason: HOSPADM

## 2022-11-25 RX ORDER — MORPHINE SULFATE 2 MG/ML
2 INJECTION, SOLUTION INTRAMUSCULAR; INTRAVENOUS
Status: DISCONTINUED | OUTPATIENT
Start: 2022-11-25 | End: 2022-12-04

## 2022-11-25 RX ORDER — MORPHINE SULFATE 4 MG/ML
4 INJECTION, SOLUTION INTRAMUSCULAR; INTRAVENOUS
Status: DISCONTINUED | OUTPATIENT
Start: 2022-11-25 | End: 2022-12-04

## 2022-11-25 RX ADMIN — GLYCOPYRROLATE 0.1 MG: 0.2 INJECTION, SOLUTION INTRAMUSCULAR; INTRAVENOUS at 10:18

## 2022-11-25 RX ADMIN — CARVEDILOL 12.5 MG: 6.25 TABLET, FILM COATED ORAL at 08:14

## 2022-11-25 RX ADMIN — Medication 10 MG: at 10:15

## 2022-11-25 RX ADMIN — VASOPRESSIN 1 UNITS: 20 INJECTION INTRAVENOUS at 11:02

## 2022-11-25 RX ADMIN — OXYCODONE HYDROCHLORIDE 10 MG: 10 TABLET ORAL at 21:51

## 2022-11-25 RX ADMIN — PANTOPRAZOLE SODIUM 40 MG: 40 TABLET, DELAYED RELEASE ORAL at 14:22

## 2022-11-25 RX ADMIN — METHOCARBAMOL 750 MG: 750 TABLET ORAL at 21:52

## 2022-11-25 RX ADMIN — LAMOTRIGINE 200 MG: 100 TABLET ORAL at 14:30

## 2022-11-25 RX ADMIN — PROPOFOL 45 MCG/KG/MIN: 10 INJECTION, EMULSION INTRAVENOUS at 09:57

## 2022-11-25 RX ADMIN — VASOPRESSIN 0.5 UNITS: 20 INJECTION INTRAVENOUS at 10:37

## 2022-11-25 RX ADMIN — OXYCODONE AND ACETAMINOPHEN 1 TABLET: 5; 325 TABLET ORAL at 08:18

## 2022-11-25 RX ADMIN — VENLAFAXINE 300 MG: 75 TABLET ORAL at 14:25

## 2022-11-25 RX ADMIN — SODIUM CHLORIDE: 9 INJECTION, SOLUTION INTRAVENOUS at 10:53

## 2022-11-25 RX ADMIN — POLYETHYLENE GLYCOL 3350 17 G: 17 POWDER, FOR SOLUTION ORAL at 22:01

## 2022-11-25 RX ADMIN — SODIUM CHLORIDE, PRESERVATIVE FREE 10 ML: 5 INJECTION INTRAVENOUS at 08:15

## 2022-11-25 RX ADMIN — PROPOFOL 50 MG: 10 INJECTION, EMULSION INTRAVENOUS at 09:56

## 2022-11-25 RX ADMIN — POTASSIUM CHLORIDE 20 MEQ: 20 TABLET, EXTENDED RELEASE ORAL at 18:14

## 2022-11-25 RX ADMIN — VASOPRESSIN 1 UNITS: 20 INJECTION INTRAVENOUS at 11:40

## 2022-11-25 RX ADMIN — VASOPRESSIN 2 UNITS: 20 INJECTION INTRAVENOUS at 10:49

## 2022-11-25 RX ADMIN — BUPIVACAINE HYDROCHLORIDE IN DEXTROSE 2 MG: 7.5 INJECTION, SOLUTION SUBARACHNOID at 10:04

## 2022-11-25 RX ADMIN — VASOPRESSIN 1 UNITS: 20 INJECTION INTRAVENOUS at 10:42

## 2022-11-25 RX ADMIN — Medication 20 MG: at 10:11

## 2022-11-25 RX ADMIN — GLYCOPYRROLATE 0.1 MG: 0.2 INJECTION, SOLUTION INTRAMUSCULAR; INTRAVENOUS at 10:48

## 2022-11-25 RX ADMIN — FENTANYL CITRATE 50 MCG: 50 INJECTION INTRAMUSCULAR; INTRAVENOUS at 05:14

## 2022-11-25 RX ADMIN — VASOPRESSIN 0.5 UNITS: 20 INJECTION INTRAVENOUS at 10:32

## 2022-11-25 RX ADMIN — SODIUM CHLORIDE, PRESERVATIVE FREE 10 ML: 5 INJECTION INTRAVENOUS at 21:52

## 2022-11-25 RX ADMIN — VASOPRESSIN 1 UNITS: 20 INJECTION INTRAVENOUS at 11:15

## 2022-11-25 RX ADMIN — CLINDAMYCIN IN 5 PERCENT DEXTROSE 900 MG: 18 INJECTION, SOLUTION INTRAVENOUS at 18:03

## 2022-11-25 RX ADMIN — MORPHINE SULFATE 4 MG: 4 INJECTION, SOLUTION INTRAMUSCULAR; INTRAVENOUS at 19:06

## 2022-11-25 RX ADMIN — MORPHINE SULFATE 4 MG: 4 INJECTION, SOLUTION INTRAMUSCULAR; INTRAVENOUS at 15:02

## 2022-11-25 RX ADMIN — FENTANYL CITRATE 25 MCG: 50 INJECTION, SOLUTION INTRAMUSCULAR; INTRAVENOUS at 10:04

## 2022-11-25 RX ADMIN — FUROSEMIDE 20 MG: 20 TABLET ORAL at 14:27

## 2022-11-25 RX ADMIN — FENTANYL CITRATE 50 MCG: 50 INJECTION INTRAMUSCULAR; INTRAVENOUS at 03:14

## 2022-11-25 RX ADMIN — TROSPIUM CHLORIDE 20 MG: 20 TABLET, FILM COATED ORAL at 21:51

## 2022-11-25 RX ADMIN — VASOPRESSIN 1 UNITS: 20 INJECTION INTRAVENOUS at 11:27

## 2022-11-25 RX ADMIN — METHOCARBAMOL 750 MG: 750 TABLET ORAL at 14:22

## 2022-11-25 RX ADMIN — OXYCODONE AND ACETAMINOPHEN 1 TABLET: 5; 325 TABLET ORAL at 14:21

## 2022-11-25 RX ADMIN — OXYCODONE AND ACETAMINOPHEN 1 TABLET: 5; 325 TABLET ORAL at 01:05

## 2022-11-25 RX ADMIN — BENZOCAINE AND MENTHOL 1 LOZENGE: 15; 3.6 LOZENGE ORAL at 21:46

## 2022-11-25 RX ADMIN — PHENYLEPHRINE HYDROCHLORIDE 200 MCG: 10 INJECTION INTRAVENOUS at 10:02

## 2022-11-25 RX ADMIN — HYDROMORPHONE HYDROCHLORIDE 0.5 MG: 1 INJECTION, SOLUTION INTRAMUSCULAR; INTRAVENOUS; SUBCUTANEOUS at 12:27

## 2022-11-25 RX ADMIN — SODIUM CHLORIDE: 9 INJECTION, SOLUTION INTRAVENOUS at 09:47

## 2022-11-25 RX ADMIN — METHOCARBAMOL 750 MG: 750 TABLET ORAL at 18:01

## 2022-11-25 RX ADMIN — FENTANYL CITRATE 50 MCG: 50 INJECTION, SOLUTION INTRAMUSCULAR; INTRAVENOUS at 09:54

## 2022-11-25 RX ADMIN — HYDROMORPHONE HYDROCHLORIDE 0.5 MG: 1 INJECTION, SOLUTION INTRAMUSCULAR; INTRAVENOUS; SUBCUTANEOUS at 12:55

## 2022-11-25 RX ADMIN — Medication 20 MG: at 10:09

## 2022-11-25 RX ADMIN — LISINOPRIL 5 MG: 5 TABLET ORAL at 08:14

## 2022-11-25 RX ADMIN — CLINDAMYCIN IN 5 PERCENT DEXTROSE 900 MG: 18 INJECTION, SOLUTION INTRAVENOUS at 10:19

## 2022-11-25 RX ADMIN — VASOPRESSIN 1 UNITS: 20 INJECTION INTRAVENOUS at 10:15

## 2022-11-25 ASSESSMENT — PAIN DESCRIPTION - LOCATION
LOCATION: LEG;HIP
LOCATION: LEG;HIP
LOCATION: LEG
LOCATION: LEG
LOCATION: HIP
LOCATION: LEG

## 2022-11-25 ASSESSMENT — PAIN DESCRIPTION - PAIN TYPE
TYPE: ACUTE PAIN;SURGICAL PAIN
TYPE: ACUTE PAIN;SURGICAL PAIN
TYPE: SURGICAL PAIN
TYPE: ACUTE PAIN;SURGICAL PAIN
TYPE: SURGICAL PAIN

## 2022-11-25 ASSESSMENT — PAIN DESCRIPTION - DESCRIPTORS
DESCRIPTORS: ACHING;DISCOMFORT;SORE
DESCRIPTORS: DULL;ACHING;DISCOMFORT
DESCRIPTORS: ACHING;DISCOMFORT;SORE
DESCRIPTORS: ACHING;DISCOMFORT;NAGGING;SORE
DESCRIPTORS: DULL;ACHING;DISCOMFORT
DESCRIPTORS: ACHING;PRESSURE;SHARP
DESCRIPTORS: ACHING;DISCOMFORT;SORE
DESCRIPTORS: ACHING;SHARP

## 2022-11-25 ASSESSMENT — PAIN SCALES - GENERAL
PAINLEVEL_OUTOF10: 6
PAINLEVEL_OUTOF10: 6
PAINLEVEL_OUTOF10: 5
PAINLEVEL_OUTOF10: 10
PAINLEVEL_OUTOF10: 8
PAINLEVEL_OUTOF10: 8
PAINLEVEL_OUTOF10: 9
PAINLEVEL_OUTOF10: 7
PAINLEVEL_OUTOF10: 8
PAINLEVEL_OUTOF10: 7
PAINLEVEL_OUTOF10: 8
PAINLEVEL_OUTOF10: 8
PAINLEVEL_OUTOF10: 10
PAINLEVEL_OUTOF10: 8
PAINLEVEL_OUTOF10: 9
PAINLEVEL_OUTOF10: 8
PAINLEVEL_OUTOF10: 6
PAINLEVEL_OUTOF10: 7
PAINLEVEL_OUTOF10: 10

## 2022-11-25 ASSESSMENT — PAIN DESCRIPTION - ONSET
ONSET: ON-GOING

## 2022-11-25 ASSESSMENT — PAIN DESCRIPTION - FREQUENCY
FREQUENCY: CONTINUOUS

## 2022-11-25 ASSESSMENT — PAIN DESCRIPTION - ORIENTATION
ORIENTATION: RIGHT

## 2022-11-25 ASSESSMENT — PAIN - FUNCTIONAL ASSESSMENT
PAIN_FUNCTIONAL_ASSESSMENT: PREVENTS OR INTERFERES SOME ACTIVE ACTIVITIES AND ADLS

## 2022-11-25 ASSESSMENT — LIFESTYLE VARIABLES: SMOKING_STATUS: 0

## 2022-11-25 NOTE — DISCHARGE INSTRUCTIONS
[facility] Department of Orthopedic Surgery  [address], DO         Dr. Anselm Pester, MD Dr. Johnney Breeze, MD Frederick Cross, PA-C Colie Cooks PA-C Chipper Scripture PA-C      Orthopaedics Discharge Instructions   Weight bearing Status - Non-weight bearing - on right lower Extremity  Pain medication Per Prescriptions  Contact Office for Medication Refill- [id number] can refill pain med every 7 days  If patient discharging to facility then pain control will be continued per facility physician  Ice to operative/injured site for 15-30 minutes of each hour for next 5 days    Recommend that you continue to ice the area 2-3 times per day after this   Elevate operative/injured limb on 2 pillows at home  Goal is to have limb above the heart if able  Continue DVT Prophylaxis (blood clot prevention) as Prescribed   Wound care - Can take off the dressing at the surgical site seven days after the date of surgery. Can just peel off. After, do daily dressing changes as needed until the drainage from the surgical site ceases  Fracture Care -  maintain hinged ROM to R LE  Follow Up in Office in 2 weeks. Your first post op appointment is often with one of our PAs. Call the office at [phone] or directions or with any questions. Watch for these significant complications. Call physician if they or any other problems occur:  Fever over 101°, redness, swelling or warmth at the operative site  Unrelieved nausea    Foul smelling or cloudy drainage at the operative site   Unrelieved pain    Blood soaked dressing. (Some oozing may be normal)     Numb, pale, blue, cold or tingling extremity    No future appointments. It is the Department of Orthopaedic Trauma's standard of practice that providers will de-escalate(wean) all patients from narcotic(opioid) medications during the post-operative period.    We provide multimodal pain control but opioid medications are tapered in all of our patients. If patient requires referral to pain management for prolonged taper off of opioid pain medication we will facilitate this process.

## 2022-11-25 NOTE — PROGRESS NOTES
Discussion regarding upcoming procedure was had with patient and daughter Karolyn Romero. All questions and concerns were addressed during this encounter to patient and patient's daughter satisfaction. Patient is agreeable.    Continue current plan as indicated in most recent progress note  Surgery tomorrow 11/25  NPO diet effective midnight

## 2022-11-25 NOTE — PROGRESS NOTES
Pt requested to have fluids shut off for the remainder of the night.  He can not keep arm unbent and it is setting off the machine

## 2022-11-25 NOTE — PROGRESS NOTES
Hospitalist Progress Note      SYNOPSIS: Patient admitted on 2022 for right knee pain following a fall from standing height. He was a transfer from outside hospital.    Patient is a 76 y.o. male with a past medical history of hypertension, depression, osteoporosis who presents with the chief complaint of right knee pain following a fall from standing height. Patient states that he was taking a step up from his living room to his kitchen and as he went to take a step he felt his knee buckle fell to the ground. Denies any his head denies loss of consciousness. He states he had immediate onset pain to the right knee and subsequently sought medical treatment at [facility]. Was found to have a periprosthetic distal femur fracture. Transferred to [facility] for definitive care and treatment. SUBJECTIVE:    Patient seen and examined in his room. Patient aware that he is going for his right leg surgery today. Complaining of some muscle cramps, orthopedic team updated. Denies any chest pain, nausea, vomiting. Records reviewed. Stable overnight. No other overnight issues reported. Temp (24hrs), Av.2 °F (37.3 °C), Min:99.2 °F (37.3 °C), Max:99.2 °F (37.3 °C)    DIET: Diet NPO Exceptions are: Sips of Water with Meds  CODE: Full Code    Intake/Output Summary (Last 24 hours) at 2022 0800  Last data filed at 2022  Gross per 24 hour   Intake 300 ml   Output 25 ml   Net 275 ml       OBJECTIVE:    /69   Pulse 68   Temp 99.2 °F (37.3 °C) (Temporal)   Resp 15   Ht 6' 2\" (1.88 m)   Wt 212 lb (96.2 kg)   SpO2 96%   BMI 27.22 kg/m²     General appearance: No apparent distress, appears stated age and cooperative. HEENT:  Conjunctivae/corneas clear. Neck: Supple. No jugular venous distention.    Respiratory: Clear to auscultation bilaterally, normal respiratory effort  Cardiovascular: Regular rate rhythm, normal S1-S2  Abdomen: Soft, nontender, nondistended  Musculoskeletal: No clubbing, cyanosis, no bilateral lower extremity edema. Brisk capillary refill. Skin:  No rashes  on visible skin  Neurologic: awake, alert and following commands     ASSESSMENT & PLAN:    ASSESSMENT:  -closed right periprosthetic distal femur fracture  -Hypertension  -Hyperlipidemia  -GERD  -Elevated creatinine, likely CKD, monitor        PLAN:  -Orthopedics following  -N.p.o. on the night of 11/24, plan for OR on 11/25  -Normal saline 75 mL/h, discontinue once patient comes back and resumes regular diet  -Pain management  -Continue home medications  Creatinine trending up from 2.0-2.6, obtain bladder scan       DISPOSITION:   Unclear discharge disposition, will be decided after the surgery and PT/OT evaluation.     Medications:  REVIEWED DAILY    Infusion Medications    sodium chloride      sodium chloride 75 mL/hr at 11/24/22 0141     Scheduled Medications    methocarbamol  750 mg Oral 4x Daily    carvedilol  12.5 mg Oral Daily    furosemide  20 mg Oral Daily    lamoTRIgine  200 mg Oral Daily    lisinopril  5 mg Oral Daily    pantoprazole  40 mg Oral QAM AC    venlafaxine  300 mg Oral Daily    trospium  20 mg Oral Nightly    sodium chloride flush  10 mL IntraVENous 2 times per day    potassium chloride  20 mEq Oral BID WC    clindamycin (CLEOCIN) IV  900 mg IntraVENous See Admin Instructions     PRN Meds: fentanNYL, oxyCODONE-acetaminophen, sodium chloride flush, sodium chloride, promethazine **OR** ondansetron, polyethylene glycol, acetaminophen **OR** acetaminophen    Labs:     Recent Labs     11/24/22  0528 11/25/22  0449   WBC 10.8 14.2*   HGB 13.2 12.4*   HCT 42.0 38.1    129*       Recent Labs     11/24/22  0528 11/25/22  0449    139   K 3.4* 4.2    102   CO2 27 24   BUN 15 21   CREATININE 2.0* 2.6*   CALCIUM 9.2 9.1       Recent Labs     11/24/22  0528   PROT 6.5   ALKPHOS 100   ALT 19   AST 19   BILITOT 0.5       Recent Labs     11/24/22  0005   INR 1.2       No results for input(s): Abeba Guerrierchristine in the last 72 hours. Chronic labs:    Lab Results   Component Value Date    INR 1.2 11/24/2022       Radiology: REVIEWED DAILY    +++++++++++++++++++++++++++++++++++++++++++++++++  Anaya Hayes MD  [facility] [address], [address]  +++++++++++++++++++++++++++++++++++++++++++++++++  NOTE: This report was transcribed using voice recognition software. Every effort was made to ensure accuracy; however, inadvertent computerized transcription errors may be present.

## 2022-11-25 NOTE — ANESTHESIA POSTPROCEDURE EVALUATION
Department of Anesthesiology  Postprocedure Note    Patient: Geetha Terry  MRN: [id number]  [place]: 1945  Date of evaluation: 11/25/2022      Procedure Summary     Date: 11/25/22 [place] [facility] [place] [facility]    Anesthesia Start: 9654 Anesthesia Stop: 9452    Procedure: RIGHT PERIPROSTHETIC DISTAL FEMUR OPEN REDUCTION INTERNAL FIXATION (Right: Knee) Diagnosis:       Closed fracture of distal end of right femur, unspecified fracture morphology, [facility]      (RIGHT DISTAL FEMUR FRACTURE)    Surgeons: Clayton Mccord DO Responsible Provider: Joseph Nair MD    Anesthesia Type: Spinal, MAC ASA Status: 3          Anesthesia Type: Spinal, MAC    Elsa Phase I: Elsa Score: 9    Elsa Phase II:        Anesthesia Post Evaluation    Patient location during evaluation: PACU  Patient participation: complete - patient participated  Level of consciousness: awake  Pain score: 3  Airway patency: patent  Nausea & Vomiting: no nausea and no vomiting  Complications: no  Cardiovascular status: blood pressure returned to baseline  Respiratory status: acceptable  Hydration status: euvolemic

## 2022-11-25 NOTE — BRIEF OP NOTE
Brief Postoperative Note      Patient: Ari Warren  YOB: 1945  MRN: [id number]    Date of Procedure: 11/25/2022    Pre-Op Diagnosis: RIGHT DISTAL FEMUR FRACTURE    Post-Op Diagnosis: Same       Procedure(s):  RIGHT PERIPROSTHETIC DISTAL FEMUR OPEN REDUCTION INTERNAL FIXATION    Surgeon(s):  Haley Hayward DO    Assistant:  Resident: Myra Garay DO    Anesthesia: General    Estimated Blood Loss (mL): less than 50     Complications: None    Specimens:   * No specimens in log *    Implants:  Implant Name Type Inv. Item Serial No.  Lot No. LRB No. Used Action   PLATE BNE [id number] 18 H ST R CNDYL S STL CRV RENATO COMPR TOI - [id number]  PLATE BNE [id number] 18 H ST R CNDYL S STL CRV RENATO COMPR TOI  [employer]-WD  Right 1 Implanted   SCREW BNE L10MM DIA5MM UNICORTICAL PERIPROSTHETIC S STL ST - [id number]  SCREW BNE L10MM DIA5MM UNICORTICAL PERIPROSTHETIC S STL ST  [employer]PU[employer] [place]-  Right 1 Implanted   SCREW BNE L80MM DIA5MM CNDYL S STL ST TOI ANG RENATO T25 - [id number]  SCREW BNE L80MM DIA5MM CNDYL S STL ST TOI ANG RENATO T25  [employer] [place]-  Right 1 Implanted   SCREW BNE L90MM DIA5MM CNDYL S STL ST TOI ANG RENATO FULL THRD - [id number]  SCREW BNE L90MM DIA5MM CNDYL S STL ST TOI ANG RENATO FULL THRD  [employer] [place]-WD  Right 3 Implanted   SCREW BNE L36MM DIA5MM CNDYL S STL ST TOI ANG RENATO COMPR T25 - [id number]  SCREW BNE L36MM DIA5MM CNDYL S STL ST TOI ANG RENATO COMPR T25  [employer] [place]-  Right 2 Implanted   SCREW BNE L95MM DIA5MM S STL ST TOI ANG RENATO FULL THRD T25 - [id number]  SCREW BNE L95MM DIA5MM S STL ST TOI ANG RENATO FULL THRD T25  [employer] [place]-  Right 2 Implanted   SCREW MADISON SCREOPTILINK SLF TAP SD 5.0X95MM ST - [id number]  SCREW MADISON SCREOPTILINK SLF TAP SD 5.0X95MM ST  [facility]-WD  Right 1 Implanted   SCREW BNE L44MM DIA4. 5MM PROX ANNETTE TIB S STL ST RENATO FULL - [id number]  SCREW BNE L44MM DIA4. 5MM PROX ANNETTE TIB S STL ST RENATO FULL  [employer] [place]-WD  Right 1 Implanted SCREW BNE L85MM DIA4. 5MM HD DIA8MM ANNETTE S STL ST FULL THRD - [id number]  SCREW BNE L85MM DIA4. 5MM HD DIA8MM ANNETTE S STL ST FULL THRD  [employer] [place]WD  Right 1 Explanted   SCREW BNE L46MM DIA4. 5MM PROX ANNETTE TIB S STL ST RENATO FULL - [id number]  SCREW BNE L46MM DIA4. 5MM PROX ANNETTE TIB S STL ST RENATO FULL  [employer] [place]WD  Right 1 Implanted   SCREW BNE L36MM DIA4. 5MM PROX ANNETTE TIB S STL ST RENATO FULL - [id number]  SCREW BNE L36MM DIA4. 5MM PROX ANNETTE TIB S STL ST RENATO FULL  [employer] [place]WD  Right 1 Implanted   SCREW BNE L18MM DIA5MM CNDYL S STL ST TOI ANG RENATO COMPR T25 - [id number]  SCREW BNE L18MM DIA5MM CNDYL S STL ST TOI ANG RENATO COMPR T25  [employer] [place]  Right 1 Explanted   SCREW BNE L56MM DIA4. 5MM PROX ANNETTE TIB S STL ST RENATO FULL - [id number]  SCREW BNE L56MM DIA4. 5MM PROX ANNETTE TIB S STL ST RENATO FULL  [employer] [place]  Right 1 Explanted   SCREW BNE L60MM DIA4. 5MM PROX ANNETTE TIB S STL ST RENATO FULL - [id number]  SCREW BNE L60MM DIA4. 5MM PROX ANNETTE TIB S STL ST RENATO FULL  [employer] [place]-WD  Right 1 Explanted   SCREW MADISON SCREOPTILINK SLF TAP SD 5.0X95MM ST - [id number]  SCREW MADISON SCREOPTILINK SLF TAP SD 5.0X95MM ST  [facility]-WD  Right 1 Implanted   CABLE SURG DIA1. 7MM S STL HA CERCLAGE W/ CRMP [id number]] [employer] [place]] - [id number]  CABLE SURG DIA1. 7MM S STL HA CERCLAGE W/ CRMP [id number]] DEPU[employer] [place]]  [employer] [place]-WD  Right 1 Implanted         Drains: * No LDAs found *    Findings: see detailed op note    Electronically signed by Wendy Cohen DO on 11/25/2022 at 12:05 PM

## 2022-11-25 NOTE — ANESTHESIA PROCEDURE NOTES
Spinal Block    Patient location during procedure: OR  End time: 11/25/2022 10:04 AM  Reason for block: primary anesthetic  Staffing  Performed: anesthesiologist and other anesthesia staff   Anesthesiologist: Evelynn Schaumann, MD  Other anesthesia staff: Ari Stoner RN  Spinal Block  Patient position: sitting  Prep: ChloraPrep  Patient monitoring: continuous pulse ox and frequent blood pressure checks  Approach: midline  Location: L3/L4  Provider prep: mask and sterile gown  Local infiltration: lidocaine  Needle  Needle type: Quincke   Needle gauge: 22 G  Needle length: 3.5 in  Assessment  Swirl obtained: Yes  CSF: clear  Attempts: 1  Hemodynamics: stable  Preanesthetic Checklist  Completed: patient identified, IV checked, site marked, risks and benefits discussed, surgical/procedural consents, equipment checked, pre-op evaluation, timeout performed, anesthesia consent given, oxygen available and monitors applied/VS acknowledged

## 2022-11-25 NOTE — CARE COORDINATION
2022 social work transition of care planning  Sw spoke with spouse and son in room. Pt was out for sx. Pt is from home with spouse and uses walker. Pt has s/c and bsc at home also. Pt has hx of snf at [facility] not return. Pt reportedly active with [address] c-checking with liaison. Explained sw role in transition of care planning. Plan unclear at this time. Will need post op therapy. Sw left radha list with pt's spouse. Sw will follow. The Plan for Transition of Care is related to the following treatment goals: plof    The Patient and/or patient representative  was provided with a choice of provider and agrees   with the discharge plan. [x] Yes [] No    Freedom of choice list was provided with basic dialogue that supports the patient's individualized plan of care/goals, treatment preferences and shares the quality data associated with the providers. [x] Yes [] No  Electronically signed by LALA Bernal on 2022 at 2:04 PM      Addendum; Javon followed up with [facility] date of service was . Samira  1560 discharged the end of . Javon will follow up.   Electronically signed by LALA Bernal on 2022 at 2:23 PM

## 2022-11-25 NOTE — OP NOTE
Operative Note      Patient: Brandon Major  YOB: 1945  MRN: [id number]    Date of Procedure: 11/25/2022    Pre-Op Diagnosis: RIGHT DISTAL FEMUR periprosthetic FRACTURE    Post-Op Diagnosis: Same       Procedure(s):  RIGHT PERIPROSTHETIC DISTAL FEMUR OPEN REDUCTION INTERNAL FIXATION    Surgeon(s):  Milad Berg DO    Assistant:   Resident: Eric Conn DO    Anesthesia: General    Estimated Blood Loss (mL): 944     Complications: None        Implants:  Implant Name Type Inv. Item Serial No.  Lot No. LRB No. Used Action   PLATE BNE [id number] 18 H ST R CNDYL S STL CRV RENATO COMPR TOI - [id number]  PLATE BNE [id number] 18 H ST R CNDYL S STL CRV RENATO COMPR TOI  [employer] [place]-  Right 1 Implanted   SCREW BNE L10MM DIA5MM UNICORTICAL PERIPROSTHETIC S STL ST - [id number]  SCREW BNE L10MM DIA5MM UNICORTICAL PERIPROSTHETIC S STL ST  [employer] [place]-  Right 1 Implanted   SCREW BNE L80MM DIA5MM CNDYL S STL ST TOI ANG RENATO T25 - [id number]  SCREW BNE L80MM DIA5MM CNDYL S STL ST TOI ANG RENATO T25  [employer] [place]-  Right 1 Implanted   SCREW BNE L90MM DIA5MM CNDYL S STL ST TOI ANG RENATO FULL THRD - [id number]  SCREW BNE L90MM DIA5MM CNDYL S STL ST TOI ANG RENATO FULL THRD  [employer] [place]-  Right 3 Implanted   SCREW BNE L36MM DIA5MM CNDYL S STL ST TOI ANG RENATO COMPR T25 - [id number]  SCREW BNE L36MM DIA5MM CNDYL S STL ST TOI ANG RENATO COMPR T25  [employer] [place]-  Right 2 Implanted   SCREW BNE L95MM DIA5MM S STL ST TOI ANG RENATO FULL THRD T25 - [id number]  SCREW BNE L95MM DIA5MM S STL ST TOI ANG RENATO FULL THRD T25  [employer] [place]-  Right 2 Implanted   SCREW MADISON SCREOPTILINK SLF TAP SD 5.0X95MM ST - [id number]  SCREW MADISON SCREOPTILINK SLF TAP SD 5.0X95MM ST  [facility]-WD  Right 1 Implanted   SCREW BNE L44MM DIA4. 5MM PROX ANNETTE TIB S STL ST RENATO FULL - [id number]  SCREW BNE L44MM DIA4. 5MM PROX ANNETTE TIB S STL ST RENATO FULL  [employer] [place]-WD  Right 1 Implanted   SCREW BNE L85MM DIA4. 5MM HD DIA8MM ANNETTE S STL ST FULL THRD - [id number]  SCREW BNE L85MM DIA4. 5MM HD DIA8MM ANNETTE S STL ST FULL THRD  [employer] [place]-WD  Right 1 Explanted   SCREW BNE L46MM DIA4. 5MM PROX ANNETTE TIB S STL ST RENATO FULL - [id number]  SCREW BNE L46MM DIA4. 5MM PROX ANNETTE TIB S STL ST RENATO FULL  [employer] [place]-WD  Right 1 Implanted   SCREW BNE L36MM DIA4. 5MM PROX ANNETTE TIB S STL ST RENATO FULL - [id number]  SCREW BNE L36MM DIA4. 5MM PROX ANNETTE TIB S STL ST RENATO FULL  [employer] [place]-WD  Right 1 Implanted   SCREW BNE L18MM DIA5MM CNDYL S STL ST TOI ANG RENATO COMPR T25 - [id number]  SCREW BNE L18MM DIA5MM CNDYL S STL ST TOI ANG RENATO COMPR T25  [employer] [place]-WD  Right 1 Explanted   SCREW BNE L56MM DIA4. 5MM PROX ANNETTE TIB S STL ST RENATO FULL - [id number]  SCREW BNE L56MM DIA4. 5MM PROX ANNETTE TIB S STL ST RENATO FULL  [employer] [place]-WD  Right 1 Explanted   SCREW BNE L60MM DIA4. 5MM PROX ANNETTE TIB S STL ST RENATO FULL - [id number]  SCREW BNE L60MM DIA4. 5MM PROX ANNETTE TIB S STL ST RENATO FULL  [employer] [place]-WD  Right 1 Explanted   SCREW MADISON SCREOPTILINK SLF TAP SD 5.0X95MM ST - [id number]  SCREW MADISON SCREOPTILINK SLF TAP SD 5.0X95MM ST  [facility]-WD  Right 1 Implanted   CABLE SURG DIA1. 7MM S STL HA CERCLAGE W/ CRMP [id number]] [employer] [place]] - [id number]  CABLE SURG DIA1. 7MM S STL HA CERCLAGE W/ CRMP [id number]] DEPU[employer] [place]]  [employer] [place]-WD  Right 1 Implanted         Brief history:. Patient is a pleasant 68-year-old male who unfortunately suffered a fall resulting in a periprosthetic fracture about his right distal femur. He had previously undergone right total knee arthroplasty and right total hip arthroplasty at [facility]. After evaluation there he was transferred to [facility] for further management.   After review of the imaging as well as physical examination and discussion with patient and family it was ultimately determined that surgical fixation in the form of open reduction internal fixation would be best suited for the patient. His fracture exited medially at the level of the component of the TKA. Pattern is similar to an intra articular one if it was a native knee on the medal side of the distal femoral condyle. Risk and benefits of the procedure as well as nonoperative management were reviewed and expected outcomes were reviewed as well with the patient and family. Risks of the procedure include risk from anesthesia, potential risk of death, risk of neurovascular injury, risk of infection, potential need for further surgery, etc.  Patient and family demonstrated understanding of this and wished to proceed. Procedure in detail:    The patient was seen and identified outside of the operative theater the operative extremity being the right lower was marked and identified and agreed upon by all parties present. He is taken the operative theater's spinal anesthesia was induced by the anesthesia team.  The patient was transferred from the bed to the operative table. All bony prominences and neurovascular structures were well-padded. The patient was then prepped and draped in standard sterile orthopedic fashion. Before beginning a formal timeout was called and agreed upon by all parties present. A lateral incision was based over the distal femur 10 blade was used to incise the skin electrocautery was used to maintain adequate hemostasis elevate down to the level of the iliotibial band. The iliotibial band was incised and the fracture was identified the distal femur was cleared of soft tissue to establish a footprint for the plate. After that hematoma was evacuated from the fracture site closed reduction maneuver was performed to adequately reduce the fracture and K wires were used to temporarily hold into position. Reduction was checked in both AP and lateral planes using fluoroscopy and found to be in adequate position.   Next Tapia elevator was used to slide proximally in a submuscular fashion to make room for the lateral locking plate. A 18 hole plate was chosen and slid proximally up to the level of the stem of the total hip arthroplasty. This was temporarily pinned into position both proximally and distally. AP and lateral fluoroscopy imaging was taken both at the proximal and distal extent of the plate to confirm adequate position over the bone and this was found to be in good position. A proximal unicortical locking screw was placed to hold the plate into position percutaneous fashion using 10 blade and blunt dissection down to the plate. This technique was then repeated for all percutaneous screws. [facility] Next the distal holes about the distal femur were placed using the variable angle locking guide these were drilled measured and tightened into position and confirmed under fluoroscopy to be appropriate. Next a another locking screw was placed just distal to the tip of the stem of the hip arthroplasty in a bicortical fashion. Next the fixation was spread throughout the remainder of the distal femur and femoral shaft using nonlocking bicortical screws. The most distal screw just proximal to the fracture site was drilled and placed and titrated carefully to position to complete the final reduction of the distal femur fracture. And the remaining proximal screws were then placed again using percutaneous technique. The overall construct and reduction was reviewed under AP and lateral fluoroscopic imaging and found to be in excellent position. As well as the overall mechanical alignment is in good position. Finally a formal incision was placed at the proximal extent in order to pass a cable length into the plate around the stem for added fixation. Dissection was carried down to the plate using electrocautery after 10 blade was used on the skin to maintain adequate hemostasis.   The plate was identified cable passer was introduced and maintained bony contact throughout the passage around the femur. Cable was then put into position cable passer was removed it was passed through the eyelet of the plate and tightened secured in position. Again this was confirmed to be in excellent position with fluoroscopy. All wounds were then copiously irrigated the distal and proximal.  0 Vicryl was used in running fashion of the distal and proximal extent of the fascia tom and iliotibial band for closure. 2-0 Monocryl was used on subcutaneously followed by staples on the skin. Sterile soft dressing was then applied and the patient was placed into a knee immobilizer. Anesthesia was reversed by the anesthesia team.  He was transferred from the operative table to bed and taken to PACU for recovery    Disposition:    Of note the patient did receive Cleocin for infection prophylaxis this will be continued 24 hours. He will be nonweightbearing to the right lower extremity. Physical therapy is been consulted. He will be transition into a range of motion brace set at 0 to 30 degrees. He will be placed on aspirin 325 mg twice daily once he is discharged from the hospital.  He is okay for Lovenox per medical recommendations while inpatient. We will plan for repeat imaging and evaluation in roughly 2 weeks at the orthopedic trauma clinic.   Electronically signed by Oswaldo Tai DO on 11/25/2022 at 12:19 PM

## 2022-11-25 NOTE — PLAN OF CARE
Problem: Pain  Goal: Verbalizes/displays adequate comfort level or baseline comfort level  11/25/2022 0042 by Patrick Mendoza RN  Outcome: Progressing     Problem: Safety - Adult  Goal: Free from fall injury  11/25/2022 0042 by Patrick Mendoza RN  Outcome: Progressing     Problem: Skin/Tissue Integrity  Goal: Absence of new skin breakdown  Description: 1. Monitor for areas of redness and/or skin breakdown  2. Assess vascular access sites hourly  3. Every 4-6 hours minimum:  Change oxygen saturation probe site  4. Every 4-6 hours:  If on nasal continuous positive airway pressure, respiratory therapy assess nares and determine need for appliance change or resting period.   11/25/2022 0042 by Patrick Mendoza RN  Outcome: Progressing

## 2022-11-25 NOTE — ANESTHESIA PRE PROCEDURE
Department of Anesthesiology  Preprocedure Note       Name:  Margo Webber   Age:  76 y.o.  :  1945                                          MRN:  [id number]         Date:  2022      Surgeon: Piedad Gordillo):  Lew Fuentes DO    Procedure: Procedure(s):  RIGHT PERIPROSTHETIC DISTAL FEMUR OPEN REDUCTION INTERNAL FIXATION- FLAT [place] TABLE- RESIDENT WILL NOTIFY REP    Medications prior to admission:   Prior to Admission medications    Medication Sig Start Date End Date Taking? Authorizing Provider   carvedilol (COREG) 12.5 MG tablet Take 12.5 mg by mouth daily   Yes Historical Provider, MD   omeprazole (PRILOSEC) 20 MG delayed release capsule Take 20 mg by mouth daily   Yes Historical Provider, MD   venlafaxine (EFFEXOR) 50 MG tablet Take 300 mg by mouth daily   Yes Historical Provider, MD   lisinopril (PRINIVIL;ZESTRIL) 5 MG tablet Take 5 mg by mouth daily   Yes Historical Provider, MD   furosemide (LASIX) 20 MG tablet Take 20 mg by mouth daily   Yes Historical Provider, MD   potassium chloride (KLOR-CON M) 10 MEQ extended release tablet Take 10 mEq by mouth daily   Yes Historical Provider, MD   acetaminophen (TYLENOL) 325 MG tablet Take 650 mg by mouth every 4 hours as needed for Pain   Yes Historical Provider, MD   aspirin 325 MG tablet Take 325 mg by mouth daily   Yes Historical Provider, MD   oxyCODONE-acetaminophen (PERCOCET) 5-325 MG per tablet Take 1 tablet by mouth every 6 hours as needed for Pain.    Yes Historical Provider, MD   sulfamethoxazole-trimethoprim (BACTRIM DS;SEPTRA DS) 800-160 MG per tablet Take 1 tablet by mouth 2 times daily End date 22   Yes Historical Provider, MD   Vibegron 75 MG TABS Take 75 mg by mouth daily   Yes Historical Provider, MD   lamoTRIgine (LAMICTAL) 200 MG tablet Take 200 mg by mouth daily   Yes Historical Provider, MD       Current medications:    Current Facility-Administered Medications   Medication Dose Route Frequency Provider Last Rate Last Admin    methocarbamol (ROBAXIN) tablet 750 mg  750 mg Oral 4x Daily Orvan Benes, DO        fentaNYL (SUBLIMAZE) injection 50 mcg  50 mcg IntraVENous Q2H PRN Meriel Pleasure, DO   50 mcg at 11/25/22 0514    carvedilol (COREG) tablet 12.5 mg  12.5 mg Oral Daily Meriel Pleasure, DO   12.5 mg at 11/24/22 0805    furosemide (LASIX) tablet 20 mg  20 mg Oral Daily Meriel Pleasure, DO   20 mg at 11/24/22 7130    lamoTRIgine (LAMICTAL) tablet 200 mg  200 mg Oral Daily Meriel Pleasure, DO   200 mg at 11/24/22 5075    lisinopril (PRINIVIL;ZESTRIL) tablet 5 mg  5 mg Oral Daily Meriel Pleasure, DO   5 mg at 11/24/22 0805    pantoprazole (PROTONIX) tablet 40 mg  40 mg Oral QAM AC Meriel Pleasure, DO   40 mg at 11/24/22 0617    oxyCODONE-acetaminophen (PERCOCET) 5-325 MG per tablet 1 tablet  1 tablet Oral Q6H PRN Meriel Pleasure, DO   1 tablet at 11/25/22 0105    venlafaxine (EFFEXOR) tablet 300 mg  300 mg Oral Daily Meriel Pleasure, DO   300 mg at 11/24/22 1232    trospium (SANCTURA) tablet 20 mg  20 mg Oral Nightly Meriel Pleasure, DO   20 mg at 11/24/22 2006    sodium chloride flush 0.9 % injection 10 mL  10 mL IntraVENous 2 times per day Meriel Pleasure, DO   10 mL at 11/24/22 2006    sodium chloride flush 0.9 % injection 10 mL  10 mL IntraVENous PRN Meriel Pleasure, DO        0.9 % sodium chloride infusion   IntraVENous PRN Meriel Pleasure, DO        promethazine (PHENERGAN) tablet 12.5 mg  12.5 mg Oral Q6H PRN Meriel Pleasure, DO        Or    ondansetron [facility]) injection 4 mg  4 mg IntraVENous Q6H PRN Meriel Pleasure, DO        polyethylene glycol (GLYCOLAX) packet 17 g  17 g Oral Daily PRN Meriel Pleasure, DO        acetaminophen (TYLENOL) tablet 650 mg  650 mg Oral Q6H PRN Meriel Pleasure, DO   650 mg at 11/24/22 1318    Or    acetaminophen (TYLENOL) suppository 650 mg  650 mg Rectal Q6H PRN Rajendra Willams, DO        0.9 % sodium chloride infusion   IntraVENous Continuous Rajendra Willams, DO 75 mL/hr at 11/24/22 0141 New Bag at 11/24/22 0141    potassium chloride (KLOR-CON M) extended release tablet 20 mEq  20 mEq Oral BID WC Celina Childs MD   20 mEq at 11/24/22 1503    clindamycin (CLEOCIN) 900 mg in dextrose 5 % 50 mL IVPB  900 mg IntraVENous See Admin Instructions Blessing Cano DO           Allergies: Allergies   Allergen Reactions    Ceftriaxone Anaphylaxis       Problem List:    Patient Active Problem List   Diagnosis Code    Other fracture of right femur, initial encounter for closed fracture ([facility] [address] D17.1X5F       Past Medical History:  History reviewed. No pertinent past medical history. Past Surgical History:  No past surgical history on file. Social History:    Social History     Tobacco Use    Smoking status: Unknown    Smokeless tobacco: Never   Substance Use Topics    Alcohol use: Not on file                                Counseling given: Not Answered      Vital Signs (Current):   Vitals:    11/25/22 0314 11/25/22 0344 11/25/22 0514 11/25/22 0544   BP:       Pulse:       Resp: 16 15 16 15   Temp:       TempSrc:       SpO2:       Weight:       Height:                                                  BP Readings from Last 3 Encounters:   11/24/22 126/69       NPO Status:                                                                                 BMI:   Wt Readings from Last 3 Encounters:   11/23/22 212 lb (96.2 kg)     Body mass index is 27.22 kg/m².     CBC:   Lab Results   Component Value Date/Time    WBC 14.2 11/25/2022 04:49 AM    RBC 4.39 11/25/2022 04:49 AM    HGB 12.4 11/25/2022 04:49 AM    HCT 38.1 11/25/2022 04:49 AM    MCV 86.8 11/25/2022 04:49 AM    RDW 17.4 11/25/2022 04:49 AM     11/25/2022 04:49 AM       CMP:   Lab Results   Component Value Date/Time     11/25/2022 04:49 AM    K 4.2 11/25/2022 04:49 AM    K 3.4 11/24/2022 05:28 AM     11/25/2022 04:49 AM    CO2 24 11/25/2022 04:49 AM    BUN 21 11/25/2022 04:49 AM    CREATININE 2.6 11/25/2022 04:49 AM    LABGLOM 24 11/25/2022 04:49 AM GLUCOSE 160 11/25/2022 04:49 AM    PROT 6.5 11/24/2022 05:28 AM    CALCIUM 9.1 11/25/2022 04:49 AM    BILITOT 0.5 11/24/2022 05:28 AM    ALKPHOS 100 11/24/2022 05:28 AM    AST 19 11/24/2022 05:28 AM    ALT 19 11/24/2022 05:28 AM       POC Tests: No results for input(s): POCGLU, POCNA, POCK, POCCL, POCBUN, POCHEMO, POCHCT in the last 72 hours. Coags:   Lab Results   Component Value Date/Time    PROTIME 12.6 11/24/2022 12:05 AM    INR 1.2 11/24/2022 12:05 AM    APTT 28.7 11/24/2022 12:05 AM       HCG (If Applicable): No results found for: PREGTESTUR, PREGSERUM, HCG, HCGQUANT     ABGs: No results found for: PHART, PO2ART, CTF6PKT, XBL9PDW, BEART, G5IQDGXJ     Type & Screen (If Applicable):  No results found for: LABABO, LABRH    Drug/Infectious Status (If Applicable):  No results found for: HIV, HEPCAB    COVID-19 Screening (If Applicable): No results found for: COVID19        Anesthesia Evaluation  Patient summary reviewed history of anesthetic complications (Severe combattiveness after GA for hip Fx in [facility]. Discussed incident with Pt's wife.):   Airway: Mallampati: II  TM distance: >3 FB   Neck ROM: full  Mouth opening: > = 3 FB   Dental: normal exam         Pulmonary:Negative Pulmonary ROS breath sounds clear to auscultation      (-) not a current smoker                           Cardiovascular:    (+) hypertension:,         Rhythm: regular  Rate: normal                    Neuro/Psych:   (+) depression/anxiety  (Depression)            GI/Hepatic/Renal:   (+) GERD:,           Endo/Other:                      ROS comment: Closed fracture of distal end of right femur - Perprosthetic Abdominal:             Vascular: negative vascular ROS. Other Findings:           Anesthesia Plan      spinal     ASA 3     (GA as backup)  Induction: intravenous. MIPS: Postoperative opioids intended and Prophylactic antiemetics administered. Anesthetic plan and risks discussed with patient.       Plan discussed with CRNA.                     Teri Soriano MD   11/25/2022

## 2022-11-26 LAB
ANION GAP SERPL CALCULATED.3IONS-SCNC: 16 MMOL/L (ref 7–16)
BUN BLDV-MCNC: 22 MG/DL (ref 6–23)
CALCIUM SERPL-MCNC: 9 MG/DL (ref 8.6–10.2)
CHLORIDE BLD-SCNC: 104 MMOL/L (ref 98–107)
CO2: 18 MMOL/L (ref 22–29)
CREAT SERPL-MCNC: 2.6 MG/DL (ref 0.7–1.2)
GFR SERPL CREATININE-BSD FRML MDRD: 24 ML/MIN/1.73
GLUCOSE BLD-MCNC: 180 MG/DL (ref 74–99)
HCT VFR BLD CALC: 36.1 % (ref 37–54)
HEMOGLOBIN: 11.9 G/DL (ref 12.5–16.5)
MCH RBC QN AUTO: 28.5 PG (ref 26–35)
MCHC RBC AUTO-ENTMCNC: 33 % (ref 32–34.5)
MCV RBC AUTO: 86.6 FL (ref 80–99.9)
PDW BLD-RTO: 17.6 FL (ref 11.5–15)
PLATELET # BLD: 130 E9/L (ref 130–450)
PMV BLD AUTO: 11.8 FL (ref 7–12)
POTASSIUM SERPL-SCNC: 3.7 MMOL/L (ref 3.5–5)
RBC # BLD: 4.17 E12/L (ref 3.8–5.8)
SODIUM BLD-SCNC: 138 MMOL/L (ref 132–146)
WBC # BLD: 14.5 E9/L (ref 4.5–11.5)

## 2022-11-26 PROCEDURE — 6370000000 HC RX 637 (ALT 250 FOR IP): Performed by: PHYSICAL THERAPY ASSISTANT

## 2022-11-26 PROCEDURE — 36415 COLL VENOUS BLD VENIPUNCTURE: CPT

## 2022-11-26 PROCEDURE — 2700000000 HC OXYGEN THERAPY PER DAY

## 2022-11-26 PROCEDURE — 2500000003 HC RX 250 WO HCPCS: Performed by: PHYSICAL THERAPY ASSISTANT

## 2022-11-26 PROCEDURE — 6360000002 HC RX W HCPCS: Performed by: PHYSICAL THERAPY ASSISTANT

## 2022-11-26 PROCEDURE — 6370000000 HC RX 637 (ALT 250 FOR IP): Performed by: STUDENT IN AN ORGANIZED HEALTH CARE EDUCATION/TRAINING PROGRAM

## 2022-11-26 PROCEDURE — 80048 BASIC METABOLIC PNL TOTAL CA: CPT

## 2022-11-26 PROCEDURE — 97530 THERAPEUTIC ACTIVITIES: CPT

## 2022-11-26 PROCEDURE — 2580000003 HC RX 258: Performed by: PHYSICAL THERAPY ASSISTANT

## 2022-11-26 PROCEDURE — 1200000000 HC SEMI PRIVATE

## 2022-11-26 PROCEDURE — 85027 COMPLETE CBC AUTOMATED: CPT

## 2022-11-26 PROCEDURE — 97165 OT EVAL LOW COMPLEX 30 MIN: CPT

## 2022-11-26 PROCEDURE — 97161 PT EVAL LOW COMPLEX 20 MIN: CPT

## 2022-11-26 RX ORDER — ASPIRIN 325 MG
325 TABLET, DELAYED RELEASE (ENTERIC COATED) ORAL 2 TIMES DAILY
Qty: 56 TABLET | Refills: 3 | Status: SHIPPED | OUTPATIENT
Start: 2022-11-26 | End: 2023-11-26

## 2022-11-26 RX ORDER — ASPIRIN 81 MG/1
81 TABLET ORAL 2 TIMES DAILY
Status: DISCONTINUED | OUTPATIENT
Start: 2022-11-26 | End: 2022-11-26

## 2022-11-26 RX ADMIN — VENLAFAXINE 300 MG: 75 TABLET ORAL at 09:39

## 2022-11-26 RX ADMIN — SODIUM CHLORIDE: 9 INJECTION, SOLUTION INTRAVENOUS at 14:48

## 2022-11-26 RX ADMIN — METHOCARBAMOL 750 MG: 750 TABLET ORAL at 09:38

## 2022-11-26 RX ADMIN — OXYCODONE HYDROCHLORIDE 10 MG: 10 TABLET ORAL at 09:38

## 2022-11-26 RX ADMIN — MORPHINE SULFATE 4 MG: 4 INJECTION, SOLUTION INTRAMUSCULAR; INTRAVENOUS at 14:52

## 2022-11-26 RX ADMIN — POTASSIUM CHLORIDE 20 MEQ: 20 TABLET, EXTENDED RELEASE ORAL at 09:38

## 2022-11-26 RX ADMIN — CARVEDILOL 12.5 MG: 6.25 TABLET, FILM COATED ORAL at 09:38

## 2022-11-26 RX ADMIN — METHOCARBAMOL 750 MG: 750 TABLET ORAL at 20:09

## 2022-11-26 RX ADMIN — METHOCARBAMOL 750 MG: 750 TABLET ORAL at 14:06

## 2022-11-26 RX ADMIN — LAMOTRIGINE 200 MG: 100 TABLET ORAL at 09:40

## 2022-11-26 RX ADMIN — METHOCARBAMOL 750 MG: 750 TABLET ORAL at 17:21

## 2022-11-26 RX ADMIN — CLINDAMYCIN IN 5 PERCENT DEXTROSE 900 MG: 18 INJECTION, SOLUTION INTRAVENOUS at 01:53

## 2022-11-26 RX ADMIN — LISINOPRIL 5 MG: 5 TABLET ORAL at 09:39

## 2022-11-26 RX ADMIN — MORPHINE SULFATE 4 MG: 4 INJECTION, SOLUTION INTRAMUSCULAR; INTRAVENOUS at 00:49

## 2022-11-26 RX ADMIN — PANTOPRAZOLE SODIUM 40 MG: 40 TABLET, DELAYED RELEASE ORAL at 05:49

## 2022-11-26 RX ADMIN — POTASSIUM CHLORIDE 20 MEQ: 20 TABLET, EXTENDED RELEASE ORAL at 17:51

## 2022-11-26 RX ADMIN — ASPIRIN 325 MG: 325 TABLET, COATED ORAL at 20:09

## 2022-11-26 RX ADMIN — SODIUM CHLORIDE: 9 INJECTION, SOLUTION INTRAVENOUS at 05:55

## 2022-11-26 RX ADMIN — TROSPIUM CHLORIDE 20 MG: 20 TABLET, FILM COATED ORAL at 20:09

## 2022-11-26 RX ADMIN — FUROSEMIDE 20 MG: 20 TABLET ORAL at 09:38

## 2022-11-26 RX ADMIN — OXYCODONE HYDROCHLORIDE 10 MG: 10 TABLET ORAL at 17:21

## 2022-11-26 ASSESSMENT — PAIN DESCRIPTION - ORIENTATION
ORIENTATION: RIGHT

## 2022-11-26 ASSESSMENT — PAIN DESCRIPTION - DESCRIPTORS
DESCRIPTORS: ACHING;TENDER
DESCRIPTORS: ACHING;DISCOMFORT;SORE
DESCRIPTORS: ACHING;TENDER
DESCRIPTORS: ACHING
DESCRIPTORS: ACHING;BURNING

## 2022-11-26 ASSESSMENT — PAIN DESCRIPTION - FREQUENCY
FREQUENCY: CONTINUOUS
FREQUENCY: CONTINUOUS

## 2022-11-26 ASSESSMENT — PAIN SCALES - GENERAL
PAINLEVEL_OUTOF10: 3
PAINLEVEL_OUTOF10: 9
PAINLEVEL_OUTOF10: 9
PAINLEVEL_OUTOF10: 0
PAINLEVEL_OUTOF10: 9
PAINLEVEL_OUTOF10: 10
PAINLEVEL_OUTOF10: 8
PAINLEVEL_OUTOF10: 3
PAINLEVEL_OUTOF10: 4

## 2022-11-26 ASSESSMENT — PAIN - FUNCTIONAL ASSESSMENT
PAIN_FUNCTIONAL_ASSESSMENT: PREVENTS OR INTERFERES SOME ACTIVE ACTIVITIES AND ADLS
PAIN_FUNCTIONAL_ASSESSMENT: ACTIVITIES ARE NOT PREVENTED
PAIN_FUNCTIONAL_ASSESSMENT: PREVENTS OR INTERFERES SOME ACTIVE ACTIVITIES AND ADLS
PAIN_FUNCTIONAL_ASSESSMENT: PREVENTS OR INTERFERES SOME ACTIVE ACTIVITIES AND ADLS

## 2022-11-26 ASSESSMENT — PAIN DESCRIPTION - LOCATION
LOCATION: LEG

## 2022-11-26 ASSESSMENT — PAIN SCALES - WONG BAKER: WONGBAKER_NUMERICALRESPONSE: 0

## 2022-11-26 ASSESSMENT — PAIN DESCRIPTION - ONSET
ONSET: ON-GOING
ONSET: ON-GOING

## 2022-11-26 ASSESSMENT — PAIN DESCRIPTION - PAIN TYPE
TYPE: SURGICAL PAIN
TYPE: SURGICAL PAIN

## 2022-11-26 NOTE — PROGRESS NOTES
Hospitalist Progress Note      SYNOPSIS: Patient admitted on 2022 for right knee pain following a fall from standing height. He was a transfer from outside hospital.    Patient is a 76 y.o. male with a past medical history of hypertension, depression, osteoporosis who presents with the chief complaint of right knee pain following a fall from standing height. Patient states that he was taking a step up from his living room to his kitchen and as he went to take a step he felt his knee buckle fell to the ground. Denies any his head denies loss of consciousness. He states he had immediate onset pain to the right knee and subsequently sought medical treatment at [facility]. Was found to have a periprosthetic distal femur fracture. Transferred to [facility] for definitive care and treatment. Status post distal femur ORIF on 2022  Plan for DVT prophylaxis with aspirin 81 mg p.o. twice daily    SUBJECTIVE:    Patient seen and examined in his room. Patient states that pain is well controlled. Denies any chest pain, nausea, vomiting. Records reviewed. Stable overnight. No other overnight issues reported. Temp (24hrs), Av.3 °F (36.3 °C), Min:97 °F (36.1 °C), Max:97.5 °F (36.4 °C)    DIET: ADULT DIET; Regular  CODE: Full Code    Intake/Output Summary (Last 24 hours) at 2022 0851  Last data filed at 2022 0734  Gross per 24 hour   Intake 1660 ml   Output 1400 ml   Net 260 ml       OBJECTIVE:    BP (!) 147/75   Pulse 69   Temp 97.5 °F (36.4 °C) (Temporal)   Resp 18   Ht 6' 2\" (1.88 m)   Wt 212 lb (96.2 kg)   SpO2 100%   BMI 27.22 kg/m²     General appearance: No apparent distress, appears stated age and cooperative. HEENT:  Conjunctivae/corneas clear. Neck: Supple. No jugular venous distention.    Respiratory: Clear to auscultation bilaterally, normal respiratory effort  Cardiovascular: Regular rate rhythm, normal S1-S2  Abdomen: Soft, nontender, nondistended  Musculoskeletal: No clubbing, cyanosis, no bilateral lower extremity edema. Brisk capillary refill. Skin:  No rashes  on visible skin  Neurologic: awake, alert and following commands     ASSESSMENT & PLAN:    ASSESSMENT:  -closed right periprosthetic distal femur fracture  -Hypertension  -Hyperlipidemia  -GERD  -Elevated creatinine, likely CKD, monitor        PLAN:  -Orthopedics following  Discontinued IV fluids  Status post distal femur ORIF on 11/25/2022  Plan for DVT prophylaxis with aspirin 81 mg p.o. twice daily  -Pain management  -Continue home medications  Creatinine trending up from 2.0-2.6, obtain bladder scan       DISPOSITION:   Unclear discharge disposition, will be decided after the surgery and PT/OT evaluation.     Medications:  REVIEWED DAILY    Infusion Medications    sodium chloride      sodium chloride 75 mL/hr at 11/26/22 0555     Scheduled Medications    aspirin  81 mg Oral BID    methocarbamol  750 mg Oral 4x Daily    carvedilol  12.5 mg Oral Daily    furosemide  20 mg Oral Daily    lamoTRIgine  200 mg Oral Daily    lisinopril  5 mg Oral Daily    pantoprazole  40 mg Oral QAM AC    venlafaxine  300 mg Oral Daily    trospium  20 mg Oral Nightly    sodium chloride flush  10 mL IntraVENous 2 times per day    potassium chloride  20 mEq Oral BID WC     PRN Meds: benzocaine-menthol, morphine **OR** morphine, oxyCODONE **OR** oxyCODONE, sodium chloride flush, sodium chloride, promethazine **OR** ondansetron, polyethylene glycol, acetaminophen **OR** acetaminophen    Labs:     Recent Labs     11/24/22 0528 11/25/22 0449 11/26/22  0658   WBC 10.8 14.2* 14.5*   HGB 13.2 12.4* 11.9*   HCT 42.0 38.1 36.1*    129* 130       Recent Labs     11/24/22 0528 11/25/22 0449 11/26/22  0658    139 138   K 3.4* 4.2 3.7    102 104   CO2 27 24 18*   BUN 15 21 22   CREATININE 2.0* 2.6* 2.6*   CALCIUM 9.2 9.1 9.0       Recent Labs     11/24/22 0528 PROT 6.5   ALKPHOS 100   ALT 19   AST 19   BILITOT 0.5       Recent Labs     11/24/22  0005   INR 1.2       No results for input(s): [place] in the last 72 hours. Chronic labs:    Lab Results   Component Value Date    INR 1.2 11/24/2022       Radiology: REVIEWED DAILY    +++++++++++++++++++++++++++++++++++++++++++++++++  Ryley Wells MD  [facility] - [address] [place] [address]  +++++++++++++++++++++++++++++++++++++++++++++++++  NOTE: This report was transcribed using voice recognition software. Every effort was made to ensure accuracy; however, inadvertent computerized transcription errors may be present.

## 2022-11-26 NOTE — PROGRESS NOTES
[address]:                                                Patient Name: Yasmeen Fox  MRN: [id number]  : 1945  Room: [address] [place] [address]     Evaluating OT:Antonina Alvarez OTR/L   License #  [id number]       Referring Provider: Torin Arora DO    Specific Provider Orders/Date: OT evaluation & treatment        Diagnosis:  RIGHT DISTAL FEMUR FRACTURE    Pertinent Medical History:  has no past medical history on file. Surgery: 22: RIGHT PERIPROSTHETIC DISTAL FEMUR OPEN REDUCTION INTERNAL FIXATION       Past Surgical History:  has no past surgical history on file. Precautions:  Fall Risk, NWB R LE, R KI with transition to Hinged brace set 0-30, O2 via NC, parham, bed alarm, cognition      Assessment of current deficits    [x] Functional mobility            [x]ADLs           [x] Strength                  [x]Cognition    [x] Functional transfers          [x] IADLs         [x] Safety Awareness   [x]Endurance    [x] Fine Coordination                         [x] Balance      [] Vision/perception   [x]Sensation      []Gross Motor Coordination             [] ROM           [] Delirium                   [] Motor Control      OT PLAN OF CARE   OT POC based on physician orders, patient diagnosis and results of clinical assessment     Frequency/Duration: 2-4 days/wk for 2 weeks PRN   Specific OT Treatment Interventions to include:    Instruction/training on adapted ADL techniques and AE recommendations to increase functional independence within precautions  Training on energy conservation strategies, correct breathing pattern and techniques to improve independence/tolerance for self-care routine  Functional transfer/mobility training/DME recommendations for increased independence, safety, and fall prevention  Patient/Family education to increase follow through with safety techniques and functional independence  Recommendation of environmental modifications for increased safety with functional transfers/mobility and ADLs  Cognitive retraining/development of therapeutic activities to improve problem solving, judgement, memory, and attention for increased safety/participation in ADL/IADL tasks  Splinting/positioning for increased function, prevention of contractures, and improve skin integrity  Therapeutic exercise to improve motor endurance, ROM, and functional strength for ADLs/functional transfers  Therapeutic activities to facilitate/challenge dynamic balance, stand tolerance for increased safety and independence with ADLs  Therapeutic activities to facilitate gross/fine motor skills for increased independence with ADLs  Positioning to improve skin integrity, interaction with environment and functional independence     Recommended Adaptive Equipment:  TBD      Home Living: Pt lives with wife in a ?? story with 14?? steps to enter with B HR. B&B on ?? level. Bathroom setup: ?? Equipment owned: piter, bsjosefina per chart     Prior Level of Function: ?? with ADLs , ?? with IADLs; ambulated ? ? Driving: ?? Occupation: retired  Comments: Pt. Is questionable historian,  easily distracted, agitated with any movement, inconsistent following commands. Pain Level: severe R LE; with any movement (not necessarily movement of operative LE)  Cognition: A&O: 2/4 (self, situation; does not answer month/year/place);  Follows 1 step directions inconsistently              Memory:  F              Sequencing:  P              Problem solving:  P              Judgement/safety:  P. Perseverates on pain, resistive to movement                Functional Assessment:  AM-PAC Daily Activity Raw Score: 7/24    Initial Eval Status  Date: 11-26-22 Treatment Status  Date: STGs = LTGs  Time frame: 10-14 days   Feeding Max A with cup to mouth   Set up   Grooming Dep   Set up   UB Dressing Dep   SBA    LB Dressing Dep  socks   Min A   Bathing Dep with sim. task   Min A   Toileting Dep    Min A   Bed Mobility  Logroll: Dep x2  Supine to sit: Dep x2 with attempt, unable to achieve  Sit to supine: Dep x2 with attempt, unable to achieve    Supine to sit: Min A  Sit to supine: Min A   Functional Transfers NT  Pt. deferred   Mod A   Functional Mobility NT  Pt. deferred   Mod A   Balance Sitting:     Static:  NT    Dynamic:NT  Standing: NT       Activity Tolerance P   F   Visual/  Perceptual WFL          Vitals spO2 95% on 2L via NC at rest.  HR: 70 bpm   WFL      Hand Dominance R    AROM (PROM) Strength Additional Info:    RUE  WFL Grossly 4-/5 good  and wfl FMC/dexterity noted during ADL tasks      LUE WFL Grossly 4-/5 good  and wfl FMC/dexterity noted during ADL tasks         H[facility]: [employer] [facility]   Sensation:  No c/o numbness or tingling B UE  Tone: WFL B UE  Edema: none noted B UE     Comments: Upon arrival patient supine in bed, agreeable to OT, cleared by Nursing. Therapist facilitated bed mobility/ADL training bed level with focus on safety, technique & precautions; attempts to improve positioning, significant increased time d/t pt. perseveration on pain. Pt. Instructed RE: safe transfers/mobility, ADLs, role of OT, treatment plan, recs. , prec.& benefits of working with therapy. At end of session, patient returned to supine in optimal position of comfort/healing/skin integrity, all needs met, RN notified, with call light and phone within reach, all lines and tubes intact. Overall patient demonstrated decreased strength, balance, independence & safety during completion of ADL/functional transfer/mobility tasks. Pt would benefit from continued skilled OT to increase safety and independence with completion of ADL/IADL tasks for functional independence and quality of life.      Treatment: OT treatment provided this date includes:   Instruction/training on safety and adapted techniques for completion of ADLs: to increase Mercedes in self care   Instruction/training on safe functional mobility/transfer techniques: with focus on safety, technique & precautions   Instruction/training on energy conservation/work simplification for completion of ADLs: techniques to increase Mercedes with self care ADLs & iADLs, work simplification to improve endurance   Proper Positioning/Alignment: for optimal healing, skin integrity to prevent breakdown, decrease edema  Skilled monitoring of vitals: to include BP, spO2 & HR during session  Sitting/standing Balance/Tolerance- to increased balance & activity tolerance during ADLs as well as facilitate proper posture and/or positioning. Rehab Potential: Good for established goals     Patient / Family Goal: to \"be left alone. \" Therapist explained benefits of participation in therapy following Orthopedic surgery, will continue to re-address. Patient and/or family were instructed on functional diagnosis, prognosis/goals and OT plan of care. Demonstrated decreased understanding, no family present this day. Eval Complexity: Low     Time In: 11:00  Time Out: 11:25  Total Treatment Time: 10    Min Units   OT Eval Low 02722  x     OT Eval Medium 38244       OT Eval High 90048       OT Re-Eval [id number]       Therapeutic Ex 09130       Therapeutic Activities 02281  10  1   ADL/Self Care 02934       Orthotic Management [id number]       Manual [id number]       Neuro Re-Ed [id number]       Non-Billable Time          Evaluation Time additionally includes thorough review of current medical information, gathering information on past medical history/social history and prior level of function, interpretation of standardized testing/informal observation of tasks, assessment of data and development of plan of care and goals. Antonina Alvarez, OTR/L   License #  [id number]

## 2022-11-26 NOTE — PROGRESS NOTES
Physical Therapy  Physical Therapy Initial Assessment     Name: Scarlett Rater  : 1945  MRN: [id number]      Date of Service: 2022    Evaluating PT:  Brady Wolfe, PT, DPT [id number]    Room #:  8397/7037-J  Diagnosis:  Other fracture of right femur, initial encounter for closed fracture [facility]) Michelle Chery  Fall from standing, initial encounter [W19. XXXA]  Supracondyl fx femur-closed, right, initial encounter ([facility] [address] Laron Mendenhall  PMHx/PSHx:  None documented  Procedure/Surgery:  RIGHT PERIPROSTHETIC DISTAL FEMUR OPEN REDUCTION INTERNAL FIXATION (2022)  Precautions:  Falls, cognition, NWB RLE, R knee immobilizer (patient to receive R hinged knee brace set at 0-30 degrees), O2, parham catheter  Equipment Needs:  TBD  Equipment Owned:  Walker    SUBJECTIVE:    Pt is questionable historian. Per chart, patient lives with spouse and used a walker PTA. Pt reports that there are 14 stair(s) with 2 rails to enter home. OBJECTIVE:   Initial Evaluation  Date: 2022 Treatment Short Term/ Long Term   Goals   AM-PAC 6 Clicks      Was pt agreeable to Eval/treatment? Yes     Does pt have pain?  Severe RLE     Bed Mobility  Rolling: Dependent (partial rolling performed)  Supine to sit: NT (attempted; unable to perform)  Sit to supine: NT  Scooting: Dependent x2  Rolling: SBA  Supine to sit: Nadege  Sit to supine: Nadege  Scooting: SBA   Transfers Sit to stand: NT  Stand to sit: NT  Stand pivot: NT  Sit to stand: ModA  Stand to sit: ModA  Stand pivot: ModA with AAD   Ambulation   NT  3 feet (bed <> chair) with AAD ModA   Stair negotiation: ascended and descended  NT  TBD   ROM BUE:  See OT note  LLE:  WFL     Strength BUE:  See OT note  LLE:  Grossly 5/5     Balance Sitting EOB:  NT  Dynamic Standing:  NT  Sitting EOB:  SBA  Dynamic Standing:  ModA with AAD     Pt is A & O x 2 (self, situation)  Sensation:  No reports of numbness/tingling to extremities  Edema:  Unremarkable    Vitals:   HR 70, SpO2 95% (2 L O2) at rest.    Patient education  Pt educated on role of PT, weight bearing status, proper positioning/sequencing during functional mobility, use of call light for assistance. Patient response to education:   Pt verbalized understanding Pt demonstrated skill Pt requires further education in this area   Yes Yes Yes     ASSESSMENT:    Conditions Requiring Skilled Therapeutic Intervention:    []Decreased strength     []Decreased ROM  [x]Decreased functional mobility  []Decreased balance   [x]Decreased endurance   []Decreased posture  []Decreased sensation  []Decreased coordination   []Decreased vision  [x]Decreased safety awareness   [x]Increased pain       Comments:  Session cleared by nursing. Patient in semi-Bui's position upon arrival; agreeable to PT evaluation with OT collaboration. Exhibited inconsistent command follow; occasionally resistive to movement. Patient agitated, screaming in pain during activity. Supine to sit transfer initiated via assistance of BLE; patient refused to complete supine to sit due to pain. Rolling attempted multiple times in order to improve positioning of pads underneath patient; partial rolling able to be completed with total assistance. Required significantly increased time during bed mobility. Patient left in semi-Bui's position with BLE elevated, call light in reach. Instructed not to get up on own and to use call light for assistance; expressed understanding. Patient would benefit from continued skilled PT services to address functional deficits and prevent deconditioning. Treatment:  Patient practiced and was instructed in the following treatment:    Bed mobility - verbal cues to facilitate proper positioning and sequencing; physical assistance provided as needed during activity  Skilled positioning - patient positioned optimally to protect skin/joint integrity and promote comfort    Pt's/ family goals   1.  None stated    Prognosis is good for reaching above PT goals. Patient and or family understand(s) diagnosis, prognosis, and plan of care. Yes    PHYSICAL THERAPY PLAN OF CARE:    PT POC is established based on physician order and patient diagnosis     Referring provider/PT Order:    11/25/22 1600  PT eval and treat  Start:  11/25/22 1600,   End:  11/25/22 1600,   ONE TIME,   Standing Count:  1 Occurrences,   R         Madeleine Jhonny, DO     Diagnosis:  Other fracture of right femur, initial encounter for closed fracture ([facility] [address] Ovi Velasco  Fall from standing, initial encounter [W19. XXXA]  Supracondyl fx femur-closed, right, initial encounter [facility]) Adriano Ellison  Specific instructions for next treatment:  Continue to facilitate safe performance of functional mobility; progress as appropriate. Current Treatment Recommendations:     [x] Strengthening to improve independence with functional mobility   [x] ROM to improve independence with functional mobility   [x] Balance Training to improve static/dynamic balance and to reduce fall risk  [x] Endurance Training to improve activity tolerance during functional mobility   [x] Transfer Training to improve safety and independence with all functional transfers   [x] Gait Training to improve gait mechanics, endurance and assess need for appropriate assistive device  [] Stair Training in preparation for safe discharge home and/or into the community   [x] Positioning to prevent skin breakdown and contractures  [x] Safety and Education Training   [x] Patient/Caregiver Education   [] HEP  [] Other     PT long term treatment goals are located in above grid    Frequency of treatments: 2-5x/week x 1-2 weeks.     Time in  1055  Time out  1120    Total Treatment Time  10 minutes     Evaluation Time includes thorough review of current medical information, gathering information on past medical history/social history and prior level of function, completion of standardized testing/informal observation of tasks, assessment of data and education on plan of care and goals.     CPT codes:  [x] Low Complexity PT evaluation 26640  [] Moderate Complexity PT evaluation 97047  [] High Complexity PT evaluation 72665  [] PT Re-evaluation 67616  [] Gait training 22850 0 minutes  [] Manual therapy 60865 0 minutes  [x] Therapeutic activities 36024 10 minutes  [] Therapeutic exercises 35845 0 minutes  [] Neuromuscular reeducation 79152 0 minutes     Jennifer Forrester, PT, DPT  [id number]

## 2022-11-26 NOTE — PROGRESS NOTES
Department of Orthopedic Surgery  Resident Progress Note    POD 1 s/p R distal femur ORIF. Patient seen and examined. Pain moderately controlled. Patient complaining of pain throughout his R LE this morning. VITALS:  BP (!) 147/75   Pulse 69   Temp 97.5 °F (36.4 °C) (Temporal)   Resp 18   Ht 6' 2\" (1.88 m)   Wt 212 lb (96.2 kg)   SpO2 100%   BMI 27.22 kg/m²     General: in no acute distress and alert    MUSCULOSKELETAL:   right lower extremity:  Dressing C/D/I  Knee immobilizer in place  Compartments soft and compressible  Calf is soft and nontender  +PF/DF/EHL limited due to pain  Brisk Cap refill, Toes warm and perfused  Distal sensation grossly intact to Peroneals, Sural, Saphenous, and tibial nrs    CBC:   Lab Results   Component Value Date/Time    WBC 14.2 11/25/2022 04:49 AM    HGB 12.4 11/25/2022 04:49 AM    HCT 38.1 11/25/2022 04:49 AM     11/25/2022 04:49 AM     PT/INR:    Lab Results   Component Value Date/Time    PROTIME 12.6 11/24/2022 12:05 AM    INR 1.2 11/24/2022 12:05 AM       ASSESSMENT  S/P distal femur ORIF - 11/25/22    PLAN      Continue physical therapy and protocol: NWB - R LE  24 hour abx coverage to be completed today  Deep venous thrombosis prophylaxis - ASA 81 mg BID, early mobilization  Hinged ROM brace ordered, to be locked in 0-30 degrees  PT/OT  Pain Control: IV and PO  Monitor H&H, pending this AM  D/C Plan:  pending sw/cm and therapy recommendations. Patient needs hinged ROM brace prior to discharge. Ortho to follow peripherally  Continue to monitor for occult injuries    Electronically signed by Polo Perez DO on 11/26/2022 at 6:48 AM    Attending attestation:    Agree with above    Recommend change to ASA 325mg BID for DVT prophy  Appreciate medical recommendations and optimization.     Electronically signed by Star Cuba DO on 11/26/2022 at 9:03 AM

## 2022-11-26 NOTE — PLAN OF CARE
Problem: Discharge Planning  Goal: Discharge to home or other facility with appropriate resources  Outcome: Progressing  Flowsheets (Taken 11/26/2022 0045 by Edouard Magana RN)  Discharge to home or other facility with appropriate resources:   Identify barriers to discharge with patient and caregiver   Arrange for needed discharge resources and transportation as appropriate   Identify discharge learning needs (meds, wound care, etc)     Problem: Pain  Goal: Verbalizes/displays adequate comfort level or baseline comfort level  Outcome: Progressing     Problem: Safety - Adult  Goal: Free from fall injury  Outcome: Progressing     Problem: ABCDS Injury Assessment  Goal: Absence of physical injury  Outcome: Progressing     Problem: Skin/Tissue Integrity  Goal: Absence of new skin breakdown  Description: 1. Monitor for areas of redness and/or skin breakdown  2. Assess vascular access sites hourly  3. Every 4-6 hours minimum:  Change oxygen saturation probe site  4. Every 4-6 hours:  If on nasal continuous positive airway pressure, respiratory therapy assess nares and determine need for appliance change or resting period.   Outcome: Progressing

## 2022-11-27 LAB
ANION GAP SERPL CALCULATED.3IONS-SCNC: 10 MMOL/L (ref 7–16)
BUN BLDV-MCNC: 31 MG/DL (ref 6–23)
CALCIUM SERPL-MCNC: 8.3 MG/DL (ref 8.6–10.2)
CHLORIDE BLD-SCNC: 111 MMOL/L (ref 98–107)
CO2: 21 MMOL/L (ref 22–29)
CREAT SERPL-MCNC: 3.1 MG/DL (ref 0.7–1.2)
GFR SERPL CREATININE-BSD FRML MDRD: 20 ML/MIN/1.73
GLUCOSE BLD-MCNC: 143 MG/DL (ref 74–99)
HCT VFR BLD CALC: 30.3 % (ref 37–54)
HEMOGLOBIN: 9.5 G/DL (ref 12.5–16.5)
MCH RBC QN AUTO: 27.6 PG (ref 26–35)
MCHC RBC AUTO-ENTMCNC: 31.4 % (ref 32–34.5)
MCV RBC AUTO: 88.1 FL (ref 80–99.9)
PDW BLD-RTO: 18.5 FL (ref 11.5–15)
PLATELET # BLD: 134 E9/L (ref 130–450)
PMV BLD AUTO: 12 FL (ref 7–12)
POTASSIUM SERPL-SCNC: 3.5 MMOL/L (ref 3.5–5)
RBC # BLD: 3.44 E12/L (ref 3.8–5.8)
SODIUM BLD-SCNC: 142 MMOL/L (ref 132–146)
WBC # BLD: 11.9 E9/L (ref 4.5–11.5)

## 2022-11-27 PROCEDURE — 80048 BASIC METABOLIC PNL TOTAL CA: CPT

## 2022-11-27 PROCEDURE — 36415 COLL VENOUS BLD VENIPUNCTURE: CPT

## 2022-11-27 PROCEDURE — 6370000000 HC RX 637 (ALT 250 FOR IP): Performed by: PHYSICAL THERAPY ASSISTANT

## 2022-11-27 PROCEDURE — 6360000002 HC RX W HCPCS: Performed by: PHYSICAL THERAPY ASSISTANT

## 2022-11-27 PROCEDURE — 6370000000 HC RX 637 (ALT 250 FOR IP)

## 2022-11-27 PROCEDURE — 1200000000 HC SEMI PRIVATE

## 2022-11-27 PROCEDURE — 6370000000 HC RX 637 (ALT 250 FOR IP): Performed by: STUDENT IN AN ORGANIZED HEALTH CARE EDUCATION/TRAINING PROGRAM

## 2022-11-27 PROCEDURE — 6360000002 HC RX W HCPCS

## 2022-11-27 PROCEDURE — 85027 COMPLETE CBC AUTOMATED: CPT

## 2022-11-27 PROCEDURE — 2580000003 HC RX 258: Performed by: PHYSICAL THERAPY ASSISTANT

## 2022-11-27 PROCEDURE — 2700000000 HC OXYGEN THERAPY PER DAY

## 2022-11-27 RX ORDER — GABAPENTIN 100 MG/1
100 CAPSULE ORAL 3 TIMES DAILY
Status: DISCONTINUED | OUTPATIENT
Start: 2022-11-27 | End: 2022-12-05 | Stop reason: HOSPADM

## 2022-11-27 RX ORDER — ENOXAPARIN SODIUM 100 MG/ML
30 INJECTION SUBCUTANEOUS DAILY
Status: DISCONTINUED | OUTPATIENT
Start: 2022-11-27 | End: 2022-12-05 | Stop reason: HOSPADM

## 2022-11-27 RX ADMIN — FUROSEMIDE 20 MG: 20 TABLET ORAL at 09:05

## 2022-11-27 RX ADMIN — OXYCODONE HYDROCHLORIDE 10 MG: 10 TABLET ORAL at 09:06

## 2022-11-27 RX ADMIN — LISINOPRIL 5 MG: 5 TABLET ORAL at 09:04

## 2022-11-27 RX ADMIN — METHOCARBAMOL 750 MG: 750 TABLET ORAL at 17:16

## 2022-11-27 RX ADMIN — GABAPENTIN 100 MG: 100 CAPSULE ORAL at 14:13

## 2022-11-27 RX ADMIN — CARVEDILOL 12.5 MG: 6.25 TABLET, FILM COATED ORAL at 09:04

## 2022-11-27 RX ADMIN — POTASSIUM CHLORIDE 20 MEQ: 20 TABLET, EXTENDED RELEASE ORAL at 09:05

## 2022-11-27 RX ADMIN — LAMOTRIGINE 200 MG: 100 TABLET ORAL at 09:04

## 2022-11-27 RX ADMIN — MORPHINE SULFATE 2 MG: 2 INJECTION, SOLUTION INTRAMUSCULAR; INTRAVENOUS at 06:55

## 2022-11-27 RX ADMIN — VENLAFAXINE 300 MG: 75 TABLET ORAL at 09:05

## 2022-11-27 RX ADMIN — ASPIRIN 325 MG: 325 TABLET, COATED ORAL at 09:04

## 2022-11-27 RX ADMIN — METHOCARBAMOL 750 MG: 750 TABLET ORAL at 09:05

## 2022-11-27 RX ADMIN — GABAPENTIN 100 MG: 100 CAPSULE ORAL at 09:04

## 2022-11-27 RX ADMIN — METHOCARBAMOL 750 MG: 750 TABLET ORAL at 20:11

## 2022-11-27 RX ADMIN — METHOCARBAMOL 750 MG: 750 TABLET ORAL at 13:34

## 2022-11-27 RX ADMIN — ENOXAPARIN SODIUM 30 MG: 100 INJECTION SUBCUTANEOUS at 10:33

## 2022-11-27 RX ADMIN — POTASSIUM CHLORIDE 20 MEQ: 20 TABLET, EXTENDED RELEASE ORAL at 17:16

## 2022-11-27 RX ADMIN — SODIUM CHLORIDE: 9 INJECTION, SOLUTION INTRAVENOUS at 18:04

## 2022-11-27 RX ADMIN — TROSPIUM CHLORIDE 20 MG: 20 TABLET, FILM COATED ORAL at 20:11

## 2022-11-27 RX ADMIN — GABAPENTIN 100 MG: 100 CAPSULE ORAL at 20:11

## 2022-11-27 RX ADMIN — PANTOPRAZOLE SODIUM 40 MG: 40 TABLET, DELAYED RELEASE ORAL at 06:53

## 2022-11-27 ASSESSMENT — PAIN DESCRIPTION - DESCRIPTORS
DESCRIPTORS: ACHING;DISCOMFORT;SORE
DESCRIPTORS: TENDER;DISCOMFORT

## 2022-11-27 ASSESSMENT — PAIN SCALES - GENERAL
PAINLEVEL_OUTOF10: 7
PAINLEVEL_OUTOF10: 3
PAINLEVEL_OUTOF10: 6
PAINLEVEL_OUTOF10: 3

## 2022-11-27 ASSESSMENT — PAIN DESCRIPTION - ORIENTATION
ORIENTATION: RIGHT
ORIENTATION: RIGHT

## 2022-11-27 ASSESSMENT — PAIN DESCRIPTION - LOCATION
LOCATION: LEG
LOCATION: LEG

## 2022-11-27 ASSESSMENT — PAIN - FUNCTIONAL ASSESSMENT
PAIN_FUNCTIONAL_ASSESSMENT: PREVENTS OR INTERFERES SOME ACTIVE ACTIVITIES AND ADLS
PAIN_FUNCTIONAL_ASSESSMENT: ACTIVITIES ARE NOT PREVENTED

## 2022-11-27 NOTE — PROGRESS NOTES
Dr. Ksenia Moy,    Your patient is on a medication that requires a renal and/or weight dose adjustment. Renal/Body Weight Function Assessment:    Date Body Weight IBW  Adjusted BW SCr  CrCl Dialysis status   11/27/2022 212 lb (96.2 kg) Ideal body weight: 82.2 kg (181 lb 3.5 oz)  Adjusted ideal body weight: 87.8 kg (193 lb 8.5 oz) Serum creatinine: 3.1 mg/dL (H) 11/27/22 0709  Estimated creatinine clearance: 22 mL/min (A) N/a       Pharmacy has dose-adjusted the following medication(s):    Date Previous Order Adjusted Order   11/27/2022 Lovenox 40 mg qd Lovenox 30 mg qd       These changes were made per protocol according to the [facility] Renal Dosing Policy/ [facility] Pharmacist Anticoagulant Review. *Please note this dose may need readjusted if your patient's condition changes. Please contact pharmacy with any questions regarding these changes.     Alex Larsen PharmD, BCPS 11/27/2022 9:31 AM

## 2022-11-27 NOTE — PROGRESS NOTES
Hospitalist Progress Note      SYNOPSIS: Patient admitted on 2022 for right knee pain following a fall from standing height. He was a transfer from outside hospital.    Patient is a 76 y.o. male with a past medical history of hypertension, depression, osteoporosis who presents with the chief complaint of right knee pain following a fall from standing height. Patient states that he was taking a step up from his living room to his kitchen and as he went to take a step he felt his knee buckle fell to the ground. Denies any his head denies loss of consciousness. He states he had immediate onset pain to the right knee and subsequently sought medical treatment at [facility]. Was found to have a periprosthetic distal femur fracture. Transferred to [address] [place], [place] [address] for definitive care and treatment. Status post distal femur ORIF on 2022  Plan for DVT prophylaxis with aspirin 81 mg p.o. twice daily    SUBJECTIVE:    Patient seen and examined in his room. Patient states that pain is well controlled. Patient denies any dizziness or lightheadedness  Denies any chest pain, nausea, vomiting. Records reviewed. Stable overnight. No other overnight issues reported. Temp (24hrs), Av.2 °F (36.8 °C), Min:97.8 °F (36.6 °C), Max:98.4 °F (36.9 °C)    DIET: ADULT DIET; Regular  CODE: Full Code    Intake/Output Summary (Last 24 hours) at 2022 0803  Last data filed at 2022 1751  Gross per 24 hour   Intake 420 ml   Output 300 ml   Net 120 ml       OBJECTIVE:    BP (!) 104/58   Pulse 84   Temp 98.4 °F (36.9 °C) (Temporal)   Resp 16   Ht 6' 2\" (1.88 m)   Wt 212 lb (96.2 kg)   SpO2 94%   BMI 27.22 kg/m²     General appearance: No apparent distress, appears stated age and cooperative. HEENT:  Conjunctivae/corneas clear. Neck: Supple. No jugular venous distention.    Respiratory: Clear to auscultation bilaterally, normal respiratory effort  Cardiovascular: Regular rate rhythm, normal S1-S2  Abdomen: Soft, nontender, nondistended  Musculoskeletal: No clubbing, cyanosis, no bilateral lower extremity edema. Brisk capillary refill. Skin:  No rashes  on visible skin  Neurologic: awake, alert and following commands     ASSESSMENT & PLAN:    ASSESSMENT:  -closed right periprosthetic distal femur fracture  -Hypertension  -Hyperlipidemia  -GERD  -Elevated creatinine, likely CKD, monitor        PLAN:  -Orthopedics following  Discontinued IV fluids  Status post distal femur ORIF on 11/25/2022  Plan for DVT prophylaxis with aspirin 81 mg p.o. twice daily  -Pain management  -Continue home medications  Creatinine trending up from 2.0-2.6, obtain bladder scan  Continue IV fluids for another 24 hours because of borderline low systolic blood pressure       DISPOSITION:   Unclear discharge disposition, will be decided after the surgery and PT/OT evaluation. Will discharge on 11/28.     Medications:  REVIEWED DAILY    Infusion Medications    sodium chloride      sodium chloride 75 mL/hr at 11/26/22 1448     Scheduled Medications    gabapentin  100 mg Oral TID    aspirin  325 mg Oral BID    methocarbamol  750 mg Oral 4x Daily    carvedilol  12.5 mg Oral Daily    furosemide  20 mg Oral Daily    lamoTRIgine  200 mg Oral Daily    lisinopril  5 mg Oral Daily    pantoprazole  40 mg Oral QAM AC    venlafaxine  300 mg Oral Daily    trospium  20 mg Oral Nightly    sodium chloride flush  10 mL IntraVENous 2 times per day    potassium chloride  20 mEq Oral BID WC     PRN Meds: benzocaine-menthol, morphine **OR** morphine, oxyCODONE **OR** oxyCODONE, sodium chloride flush, sodium chloride, promethazine **OR** ondansetron, polyethylene glycol, acetaminophen **OR** acetaminophen    Labs:     Recent Labs     11/25/22 0449 11/26/22  0658   WBC 14.2* 14.5*   HGB 12.4* 11.9*   HCT 38.1 36.1*   * 130       Recent Labs     11/25/22 0449 11/26/22  0658    138   K 4.2

## 2022-11-27 NOTE — PROGRESS NOTES
Department of Orthopedic Surgery  Resident Progress Note    POD 2 s/p R distal femur ORIF. Patient seen and examined. Pain moderately controlled; unchanged from yesterday. Denies NTP. Denies acute fever, chills, nausea, vomiting , chest pain and shortness of breath. VITALS:  BP (!) 104/58   Pulse 84   Temp 98.4 °F (36.9 °C)   Resp 16   Ht 6' 2\" (1.88 m)   Wt 212 lb (96.2 kg)   SpO2 94%   BMI 27.22 kg/m²     General: in no acute distress and alert    MUSCULOSKELETAL:   right lower extremity:  Dressing C/D/I  Knee immobilizer in place  Compartments soft and compressible  Calf is soft and nontender  +PF/DF/EHL limited due to pain  Brisk Cap refill, Toes warm and perfused  Distal sensation grossly intact to Peroneals, Sural, Saphenous, and tibial nrs    CBC:   Lab Results   Component Value Date/Time    WBC 14.5 11/26/2022 06:58 AM    HGB 11.9 11/26/2022 06:58 AM    HCT 36.1 11/26/2022 06:58 AM     11/26/2022 06:58 AM     PT/INR:    Lab Results   Component Value Date/Time    PROTIME 12.6 11/24/2022 12:05 AM    INR 1.2 11/24/2022 12:05 AM       ASSESSMENT  S/P distal femur ORIF - 11/25/22    PLAN      Continue physical therapy and protocol: NWB - R LE  24 hour abx coverage to be completed today  Deep venous thrombosis prophylaxis -  mg BID, early mobilization  Hinged ROM brace ordered, to be locked in 0-30 degrees  PT/OT  Pain Control: IV and PO  Monitor H&H, pending this AM  D/C Plan:  pending sw/cm and therapy recommendations. Patient needs hinged ROM brace prior to discharge.       Electronically signed by Troy Almazan DO on 11/27/2022 at 7:38 AM

## 2022-11-28 LAB
ANION GAP SERPL CALCULATED.3IONS-SCNC: 11 MMOL/L (ref 7–16)
BUN BLDV-MCNC: 37 MG/DL (ref 6–23)
CALCIUM SERPL-MCNC: 8.8 MG/DL (ref 8.6–10.2)
CHLORIDE BLD-SCNC: 112 MMOL/L (ref 98–107)
CO2: 19 MMOL/L (ref 22–29)
CREAT SERPL-MCNC: 3.2 MG/DL (ref 0.7–1.2)
GFR SERPL CREATININE-BSD FRML MDRD: 19 ML/MIN/1.73
GLUCOSE BLD-MCNC: 138 MG/DL (ref 74–99)
HCT VFR BLD CALC: 30.7 % (ref 37–54)
HEMOGLOBIN: 9.8 G/DL (ref 12.5–16.5)
MCH RBC QN AUTO: 28 PG (ref 26–35)
MCHC RBC AUTO-ENTMCNC: 31.9 % (ref 32–34.5)
MCV RBC AUTO: 87.7 FL (ref 80–99.9)
PDW BLD-RTO: 18.7 FL (ref 11.5–15)
PLATELET # BLD: 139 E9/L (ref 130–450)
PMV BLD AUTO: 11.1 FL (ref 7–12)
POTASSIUM SERPL-SCNC: 4.4 MMOL/L (ref 3.5–5)
RBC # BLD: 3.5 E12/L (ref 3.8–5.8)
SODIUM BLD-SCNC: 142 MMOL/L (ref 132–146)
WBC # BLD: 9.1 E9/L (ref 4.5–11.5)

## 2022-11-28 PROCEDURE — 36415 COLL VENOUS BLD VENIPUNCTURE: CPT

## 2022-11-28 PROCEDURE — 80048 BASIC METABOLIC PNL TOTAL CA: CPT

## 2022-11-28 PROCEDURE — 97535 SELF CARE MNGMENT TRAINING: CPT

## 2022-11-28 PROCEDURE — 6370000000 HC RX 637 (ALT 250 FOR IP): Performed by: PHYSICAL THERAPY ASSISTANT

## 2022-11-28 PROCEDURE — 2700000000 HC OXYGEN THERAPY PER DAY

## 2022-11-28 PROCEDURE — 85027 COMPLETE CBC AUTOMATED: CPT

## 2022-11-28 PROCEDURE — 97530 THERAPEUTIC ACTIVITIES: CPT

## 2022-11-28 PROCEDURE — 6360000002 HC RX W HCPCS

## 2022-11-28 PROCEDURE — 6370000000 HC RX 637 (ALT 250 FOR IP)

## 2022-11-28 PROCEDURE — L1832 KO ADJ JNT POS R SUP PRE CST: HCPCS

## 2022-11-28 PROCEDURE — 1200000000 HC SEMI PRIVATE

## 2022-11-28 RX ADMIN — GABAPENTIN 100 MG: 100 CAPSULE ORAL at 20:29

## 2022-11-28 RX ADMIN — POTASSIUM CHLORIDE 20 MEQ: 20 TABLET, EXTENDED RELEASE ORAL at 12:03

## 2022-11-28 RX ADMIN — FUROSEMIDE 20 MG: 20 TABLET ORAL at 12:02

## 2022-11-28 RX ADMIN — VENLAFAXINE 300 MG: 75 TABLET ORAL at 12:02

## 2022-11-28 RX ADMIN — OXYCODONE HYDROCHLORIDE 10 MG: 10 TABLET ORAL at 11:56

## 2022-11-28 RX ADMIN — LAMOTRIGINE 200 MG: 100 TABLET ORAL at 12:02

## 2022-11-28 RX ADMIN — METHOCARBAMOL 750 MG: 750 TABLET ORAL at 12:03

## 2022-11-28 RX ADMIN — TROSPIUM CHLORIDE 20 MG: 20 TABLET, FILM COATED ORAL at 20:28

## 2022-11-28 RX ADMIN — METHOCARBAMOL 750 MG: 750 TABLET ORAL at 20:29

## 2022-11-28 RX ADMIN — GABAPENTIN 100 MG: 100 CAPSULE ORAL at 12:02

## 2022-11-28 RX ADMIN — PANTOPRAZOLE SODIUM 40 MG: 40 TABLET, DELAYED RELEASE ORAL at 05:32

## 2022-11-28 RX ADMIN — ENOXAPARIN SODIUM 30 MG: 100 INJECTION SUBCUTANEOUS at 12:03

## 2022-11-28 RX ADMIN — OXYCODONE HYDROCHLORIDE 10 MG: 10 TABLET ORAL at 20:29

## 2022-11-28 ASSESSMENT — PAIN SCALES - GENERAL
PAINLEVEL_OUTOF10: 6
PAINLEVEL_OUTOF10: 8
PAINLEVEL_OUTOF10: 0
PAINLEVEL_OUTOF10: 6

## 2022-11-28 ASSESSMENT — PAIN DESCRIPTION - FREQUENCY
FREQUENCY: INTERMITTENT
FREQUENCY: INTERMITTENT

## 2022-11-28 ASSESSMENT — PAIN DESCRIPTION - ORIENTATION
ORIENTATION: RIGHT
ORIENTATION: RIGHT

## 2022-11-28 ASSESSMENT — PAIN DESCRIPTION - ONSET
ONSET: GRADUAL
ONSET: SUDDEN

## 2022-11-28 ASSESSMENT — PAIN DESCRIPTION - DESCRIPTORS
DESCRIPTORS: ACHING;DISCOMFORT;THROBBING
DESCRIPTORS: PATIENT UNABLE TO DESCRIBE

## 2022-11-28 ASSESSMENT — PAIN DESCRIPTION - PAIN TYPE
TYPE: SURGICAL PAIN
TYPE: SURGICAL PAIN

## 2022-11-28 ASSESSMENT — PAIN DESCRIPTION - LOCATION
LOCATION: LEG
LOCATION: HIP

## 2022-11-28 ASSESSMENT — PAIN - FUNCTIONAL ASSESSMENT
PAIN_FUNCTIONAL_ASSESSMENT: PREVENTS OR INTERFERES SOME ACTIVE ACTIVITIES AND ADLS
PAIN_FUNCTIONAL_ASSESSMENT: PREVENTS OR INTERFERES WITH MANY ACTIVE NOT PASSIVE ACTIVITIES

## 2022-11-28 NOTE — PROGRESS NOTES
Hospitalist Progress Note      SYNOPSIS: Patient admitted on 2022 for right knee pain following a fall from standing height. He was a transfer from outside hospital.    Patient is a 76 y.o. male with a past medical history of hypertension, depression, osteoporosis who presents with the chief complaint of right knee pain following a fall from standing height. Patient states that he was taking a step up from his living room to his kitchen and as he went to take a step he felt his knee buckle fell to the ground. Denies any his head denies loss of consciousness. He states he had immediate onset pain to the right knee and subsequently sought medical treatment at [facility]. Was found to have a periprosthetic distal femur fracture. Transferred to [facility] for definitive care and treatment. Status post distal femur ORIF on 2022  Plan for DVT prophylaxis with aspirin 81 mg p.o. twice daily. Patient medically stable and cleared for discharge . Creatinine elevated on , held lisinopril and Coreg on 65/80 to bring systolic blood pressure up to 140s. SUBJECTIVE:    Patient seen and examined in his room. Patient slightly confused. Denies any active complaint. Denies any chest pain, nausea, vomiting. Records reviewed. Stable overnight. No other overnight issues reported. Temp (24hrs), Av.4 °F (36.3 °C), Min:97.4 °F (36.3 °C), Max:97.4 °F (36.3 °C)    DIET: ADULT DIET; Regular  CODE: Full Code    Intake/Output Summary (Last 24 hours) at 2022 0824  Last data filed at 2022 0525  Gross per 24 hour   Intake 608 ml   Output 1075 ml   Net -467 ml       OBJECTIVE:    BP (!) 116/57   Pulse 71   Temp 97.4 °F (36.3 °C) (Temporal)   Resp 18   Ht 6' 2\" (1.88 m)   Wt 212 lb (96.2 kg)   SpO2 96%   BMI 27.22 kg/m²     General appearance: No apparent distress, appears stated age and cooperative.   HEENT: **OR** oxyCODONE, sodium chloride flush, sodium chloride, promethazine **OR** ondansetron, polyethylene glycol, acetaminophen **OR** acetaminophen    Labs:     Recent Labs     11/26/22  0658 11/27/22  0709 11/28/22  0658   WBC 14.5* 11.9* 9.1   HGB 11.9* 9.5* 9.8*   HCT 36.1* 30.3* 30.7*    134 139       Recent Labs     11/26/22  0658 11/27/22  0709 11/28/22  0658    142 142   K 3.7 3.5 4.4    111* 112*   CO2 18* 21* 19*   BUN 22 31* 37*   CREATININE 2.6* 3.1* 3.2*   CALCIUM 9.0 8.3* 8.8       No results for input(s): PROT, ALB, ALKPHOS, ALT, AST, BILITOT, AMYLASE, LIPASE in the last 72 hours. No results for input(s): INR in the last 72 hours. No results for input(s): [place] in the last 72 hours. Chronic labs:    Lab Results   Component Value Date    INR 1.2 11/24/2022       Radiology: REVIEWED DAILY    +++++++++++++++++++++++++++++++++++++++++++++++++  Gloria Jauregui MD  [facility] Physician - [address] [place] [address]  +++++++++++++++++++++++++++++++++++++++++++++++++  NOTE: This report was transcribed using voice recognition software. Every effort was made to ensure accuracy; however, inadvertent computerized transcription errors may be present.

## 2022-11-28 NOTE — PROGRESS NOTES
Occupational Therapy  OT BEDSIDE TREATMENT NOTE   [facility] [address] [facility]  [address] [place] [address]      JIJY:  Patient Name: Olvin Coleman  MRN: [id number]  : 1945  Room: [address] [place] [address]     Evaluating OT:Antonina Alvarez, OTR/L   License #  [id number]        Referring Provider: Ward Barlow DO    Specific Provider Orders/Date: OT evaluation & treatment        Diagnosis:  RIGHT DISTAL FEMUR FRACTURE    Pertinent Medical History:  has no past medical history on file. Surgery: 22: RIGHT PERIPROSTHETIC DISTAL FEMUR OPEN REDUCTION INTERNAL FIXATION       Past Surgical History:  has no past surgical history on file. Precautions:  Fall Risk, NWB R LE, R KI with transition to Hinged brace set 0-30, O2 via NC, parham, bed alarm, cognition, RUE weakness? Assessment of current deficits    [x] Functional mobility            [x]ADLs           [x] Strength                  [x]Cognition    [x] Functional transfers          [x] IADLs         [x] Safety Awareness   [x]Endurance    [x] Fine Coordination                         [x] Balance      [] Vision/perception   [x]Sensation      []Gross Motor Coordination             [] ROM           [] Delirium                   [] Motor Control      OT PLAN OF CARE   OT POC based on physician orders, patient diagnosis and results of clinical assessment     Frequency/Duration: 2-4 days/wk for 2 weeks PRN   Specific OT Treatment Interventions to include:    Instruction/training on adapted ADL techniques and AE recommendations to increase functional independence within precautions  Training on energy conservation strategies, correct breathing pattern and techniques to improve independence/tolerance for self-care routine  Functional transfer/mobility training/DME recommendations for increased independence, safety, and fall prevention  Patient/Family education to increase follow through with safety techniques and functional independence  Recommendation of environmental modifications for increased safety with functional transfers/mobility and ADLs  Cognitive retraining/development of therapeutic activities to improve problem solving, judgement, memory, and attention for increased safety/participation in ADL/IADL tasks  Splinting/positioning for increased function, prevention of contractures, and improve skin integrity  Therapeutic exercise to improve motor endurance, ROM, and functional strength for ADLs/functional transfers  Therapeutic activities to facilitate/challenge dynamic balance, stand tolerance for increased safety and independence with ADLs  Therapeutic activities to facilitate gross/fine motor skills for increased independence with ADLs  Positioning to improve skin integrity, interaction with environment and functional independence     Recommended Adaptive Equipment:  TBD      Home Living: Pt lives with wife in a ?? story with 14?? steps to enter with B HR. B&B on ?? level. Bathroom setup: ?? Equipment owned: piter, gio per chart     Prior Level of Function: ?? with ADLs , ?? with IADLs; ambulated ? ? Driving: ?? Occupation: retired  Comments: Pt. Is questionable historian,  easily distracted, agitated with any movement, inconsistent following commands. Pain Level: severe R LE with movement  Cognition: A&O: 0/4 (self, situation; does not answer month/year/place);  Follows 1 step directions inconsistently              Memory:  P              Sequencing:  P              Problem solving:  P              Judgement/safety:  P                Functional Assessment:  AM-PAC Daily Activity Raw Score: 10/24    Initial Eval Status  Date: 11-26-22 Treatment Status  Date: 11/28/22 STGs = LTGs  Time frame: 10-14 days   Feeding Max A with cup to mouth Max A  Set up   Grooming Dep Max A  To wash face seated upright in bed  Set up   UB Dressing Dep  Max A  To don/doff gown seated upright in bed SBA    LB Dressing Dep socks dependent  Min A   Bathing Dep with sim. task Max A  Seated upright in bed  Min A   Toileting Dep  dependent  Min A   Bed Mobility  Logroll: Dep x2  Supine to sit: Dep x2 with attempt, unable to achieve  Sit to supine: Dep x2 with attempt, unable to achieve  Max A x 2- supine<->sit  Educated pt on technique to increase independence. Supine to sit: Min A  Sit to supine: Min A   Functional Transfers NT  Pt. deferred Unable to stand with Max A x2  Mod A   Functional Mobility NT  Pt. deferred N/T  Mod A   Balance Sitting:     Static:  NT    Dynamic:NT  Standing: NT Sitting:     Static:  Max A initially improving to Min A    Dynamic: Max A  Standing: NT      Activity Tolerance P P+  F   Visual/  Perceptual WFL          Vitals spO2 95% on 2L via NC at rest.  HR: 70 bpm  O2 91% on room air.  WFL       Comments: Upon arrival pt supine in bed. Pt educated on techniques to increase independence and safety during ADL's, bed mobility, and functional transfers. Pt having difficulty with RUE movement, difficult to complete formal assessment due to cognition, seems to be RUE weakness (nursing notified). At end of session pt left seated upright in bed, call light within reach, alarm set, environmental adjustments made to increase alertness and orientation. Pt has made fair progress towards set goals.      Continue with current plan of care    Treatment Time In: 10:08            Treatment Time Out: 10:38             Treatment Charges: Mins Units   Ther Ex  02208     Manual Therapy [id number]     Thera Activities 06083 15 1   ADL/Home Mgt 70754 15 1   Neuro Re-ed 63014     Group Therapy      Orthotic manage/training  91736     Non-Billable Time     Total Timed Treatment 30 69 Dennise Mckeon

## 2022-11-28 NOTE — PLAN OF CARE
[facility] REPORTS THAT PT REFUSED TO EAT BREAKFAST. PT ALSO REFUSED TO TAKE HIS MEDS. PT STATED\" NO THANK YOU\"THIS WRITER ENCOURAGED PT TO=TAKE HIS MEDS AND HE AGAIN STATED NO THANK YOU.

## 2022-11-28 NOTE — PROGRESS NOTES
Physical Therapy  Physical Therapy Treatment     Name: Radu Linder  : 1945  MRN: [id number]      Date of Service: 2022    Evaluating PT:  Cristina Harris, PT, DPT [id number]    Room #:  5598/4526-X  Diagnosis:  Other fracture of right femur, initial encounter for closed fracture [facility]) Camilo Gunnison  Fall from standing, initial encounter [W19. XXXA]  Supracondyl fx femur-closed, right, initial encounter ([facility] [address] Holly Batres  PMHx/PSHx:  None documented  Procedure/Surgery:  RIGHT PERIPROSTHETIC DISTAL FEMUR OPEN REDUCTION INTERNAL FIXATION (2022)  Precautions:  Falls, cognition, NWB RLE, R knee immobilizer (patient to receive R hinged knee brace set at 0-30 degrees), , parham catheter  Equipment Needs:  TBD  Equipment Owned:  Walker    SUBJECTIVE:    Pt is questionable historian. Per chart, patient lives with spouse and used a walker PTA. Pt reports that there are 14 stair(s) with 2 rails to enter home. OBJECTIVE:   Initial Evaluation  Date: 2022 Treatment  2022 Short Term/ Long Term   Goals   AM-PAC 6 Clicks     Was pt agreeable to Eval/treatment? Yes Yes    Does pt have pain?  Severe RLE Severe RLE    Bed Mobility  Rolling: Dependent (partial rolling performed)  Supine to sit: NT (attempted; unable to perform)  Sit to supine: NT  Scooting: Dependent x2 Rolling: NT  Supine to sit: MaxA x2  Sit to supine: MaxA x2  Scooting: MaxA (seated) Rolling: SBA  Supine to sit: Nadege  Sit to supine: Nadege  Scooting: SBA   Transfers Sit to stand: NT  Stand to sit: NT  Stand pivot: NT Sit to stand: MaxA x2 (attempted; unable)  Stand to sit: NT  Stand pivot: NT Sit to stand: ModA  Stand to sit: ModA  Stand pivot: ModA with AAD   Ambulation   NT NT 3 feet (bed <> chair) with AAD ModA   Stair negotiation: ascended and descended  NT NT TBD   ROM BUE:  See OT note  LLE:  WFL     Strength BUE:  See OT note  LLE:  Grossly 5/5     Balance Sitting EOB:  NT  Dynamic Standing:  NT Sitting EOB:  MaxA initially; progressed to Nadege  Dynamic Standing:  NT Sitting EOB:  SBA  Dynamic Standing:  ModA with AAD     Pt is A & O x 0  Sensation:  No reports of numbness/tingling to extremities  Edema:  Unremarkable    Vitals:   , SpO2 91% (room air) following activity. Nursing notified of SpO2. Patient education  Pt educated on weight bearing status, safety during functional mobility. Patient response to education:   Pt verbalized understanding Pt demonstrated skill Pt requires further education in this area   Yes Yes Yes     ASSESSMENT:    Comments:  Patient in semi-Bui's position with nasal cannula doffed (nursing notified), R knee immobilizer donned upon arrival; agreeable to PT session with OT collaboration. Exhibited inconsistent command follow. Required increased time, assistance of trunk and BLE to perform bed mobility. Exhibited left/posterior lean in sitting, requiring verbal cues and assistance to correct. Tolerated sitting EOB for extended period of time while interdisciplinary care was provided. Multiple sit to stand transfers attempted; unable to complete; physical assistance provided to maintain NWB RLE during activity. Patient left in semi-Bui's position with nasal cannula doffed (nursing notified), R knee immobilizer donned, BLE elevated, R heel protector donned, RUE elevated, call light in reach.     Treatment:  Patient practiced and was instructed in the following treatment:    Bed mobility - verbal cues to facilitate proper positioning and sequencing; physical assistance provided as needed during activity  Static sitting - performed to promote upright tolerance, postural awareness, and balance maintenance  Functional transfers - verbal cues to facilitate proper positioning, particularly related to hand/foot placement, during sit to stand transfer attempts; physical assistance provided as needed during activity  Skilled positioning - patient positioned optimally to protect skin/joint integrity and promote comfort    PLAN:    Patient is making good progress towards established goals. Will continue with current POC.       Time in  1008  Time out  1038    Total Treatment Time  30 minutes     CPT codes:  [] Gait training 01422 0 minutes  [] Manual therapy 79344 0 minutes  [x] Therapeutic activities 28409 30 minutes  [] Therapeutic exercises 68823 0 minutes  [] Neuromuscular reeducation 56917 0 minutes    Cherry Form, PT, DPT  [id number]

## 2022-11-28 NOTE — PLAN OF CARE
PT 93 Camila PINEDAWTRISTEN OFF. THIS WRITER ATTEMPTED TO REAPPLY GOLISANDRO AND PT BECAME AGITATED AND YELLED AT THIS WRITER\"\"I DONT WANT IT ON. !!\"

## 2022-11-28 NOTE — PROGRESS NOTES
Department of Orthopedic Surgery  Resident Progress Note    POD 3 s/p R distal femur ORIF. Patient seen and examined. Pain controlled. Patient somewhat agitated today and does not want provider to palpate his knee. Denies NTP. Denies acute fever, chills, nausea, vomiting , chest pain and shortness of breath. VITALS:  BP (!) 116/57   Pulse 71   Temp 97.4 °F (36.3 °C) (Temporal)   Resp 18   Ht 6' 2\" (1.88 m)   Wt 212 lb (96.2 kg)   SpO2 96%   BMI 27.22 kg/m²     General: in no acute distress and alert    MUSCULOSKELETAL:   right lower extremity:  Dressing C/D/I  Knee immobilizer in place  Compartments soft and compressible  Calf is soft and nontender  +PF/DF/EHL limited due to pain  Brisk Cap refill, Toes warm and perfused  Distal sensation grossly intact to Peroneals, Sural, Saphenous, and tibial nrs    CBC:   Lab Results   Component Value Date/Time    WBC 11.9 11/27/2022 07:09 AM    HGB 9.5 11/27/2022 07:09 AM    HCT 30.3 11/27/2022 07:09 AM     11/27/2022 07:09 AM     PT/INR:    Lab Results   Component Value Date/Time    PROTIME 12.6 11/24/2022 12:05 AM    INR 1.2 11/24/2022 12:05 AM       ASSESSMENT  S/P distal femur ORIF - 11/25/22    PLAN      Continue physical therapy and protocol: NWB - R LE  24 hour abx coverage to be completed today  Deep venous thrombosis prophylaxis -  mg BID, early mobilization  Hinged ROM brace ordered, to be locked in 0-30 degrees  PT/OT  Pain Control: IV and PO  Monitor H&H, pending this AM  D/C Plan:  Patient can be discharged when medically stable and have made the appropriate physical therapy gains. Patient will follow up in office in 2 weeks for repeat Xrs and evaluation. Patient needs hinged ROM brace prior to discharge.       Electronically signed by Matt Payton DO on 11/28/2022 at 6:23 AM

## 2022-11-29 LAB
ANION GAP SERPL CALCULATED.3IONS-SCNC: 15 MMOL/L (ref 7–16)
BUN BLDV-MCNC: 37 MG/DL (ref 6–23)
CALCIUM SERPL-MCNC: 9.5 MG/DL (ref 8.6–10.2)
CHLORIDE BLD-SCNC: 111 MMOL/L (ref 98–107)
CO2: 19 MMOL/L (ref 22–29)
CREAT SERPL-MCNC: 3.2 MG/DL (ref 0.7–1.2)
GFR SERPL CREATININE-BSD FRML MDRD: 19 ML/MIN/1.73
GLUCOSE BLD-MCNC: 143 MG/DL (ref 74–99)
HCT VFR BLD CALC: 33.4 % (ref 37–54)
HEMOGLOBIN: 10.5 G/DL (ref 12.5–16.5)
MCH RBC QN AUTO: 27.9 PG (ref 26–35)
MCHC RBC AUTO-ENTMCNC: 31.4 % (ref 32–34.5)
MCV RBC AUTO: 88.8 FL (ref 80–99.9)
PDW BLD-RTO: 18.7 FL (ref 11.5–15)
PLATELET # BLD: 191 E9/L (ref 130–450)
PMV BLD AUTO: 11.6 FL (ref 7–12)
POTASSIUM SERPL-SCNC: 4 MMOL/L (ref 3.5–5)
RBC # BLD: 3.76 E12/L (ref 3.8–5.8)
SODIUM BLD-SCNC: 145 MMOL/L (ref 132–146)
WBC # BLD: 8.8 E9/L (ref 4.5–11.5)

## 2022-11-29 PROCEDURE — 6370000000 HC RX 637 (ALT 250 FOR IP)

## 2022-11-29 PROCEDURE — 6360000002 HC RX W HCPCS: Performed by: PHYSICAL THERAPY ASSISTANT

## 2022-11-29 PROCEDURE — 97535 SELF CARE MNGMENT TRAINING: CPT

## 2022-11-29 PROCEDURE — 97530 THERAPEUTIC ACTIVITIES: CPT

## 2022-11-29 PROCEDURE — 1200000000 HC SEMI PRIVATE

## 2022-11-29 PROCEDURE — 6370000000 HC RX 637 (ALT 250 FOR IP): Performed by: PHYSICAL THERAPY ASSISTANT

## 2022-11-29 PROCEDURE — 80048 BASIC METABOLIC PNL TOTAL CA: CPT

## 2022-11-29 PROCEDURE — 36415 COLL VENOUS BLD VENIPUNCTURE: CPT

## 2022-11-29 PROCEDURE — 6360000002 HC RX W HCPCS

## 2022-11-29 PROCEDURE — 2580000003 HC RX 258: Performed by: PHYSICAL THERAPY ASSISTANT

## 2022-11-29 PROCEDURE — 85027 COMPLETE CBC AUTOMATED: CPT

## 2022-11-29 RX ADMIN — VENLAFAXINE 300 MG: 75 TABLET ORAL at 09:22

## 2022-11-29 RX ADMIN — LAMOTRIGINE 200 MG: 100 TABLET ORAL at 09:22

## 2022-11-29 RX ADMIN — TROSPIUM CHLORIDE 20 MG: 20 TABLET, FILM COATED ORAL at 20:59

## 2022-11-29 RX ADMIN — SODIUM CHLORIDE, PRESERVATIVE FREE 10 ML: 5 INJECTION INTRAVENOUS at 09:27

## 2022-11-29 RX ADMIN — METHOCARBAMOL 750 MG: 750 TABLET ORAL at 20:59

## 2022-11-29 RX ADMIN — OXYCODONE HYDROCHLORIDE 10 MG: 10 TABLET ORAL at 09:31

## 2022-11-29 RX ADMIN — FUROSEMIDE 20 MG: 20 TABLET ORAL at 09:22

## 2022-11-29 RX ADMIN — GABAPENTIN 100 MG: 100 CAPSULE ORAL at 20:59

## 2022-11-29 RX ADMIN — METHOCARBAMOL 750 MG: 750 TABLET ORAL at 17:45

## 2022-11-29 RX ADMIN — MORPHINE SULFATE 2 MG: 2 INJECTION, SOLUTION INTRAMUSCULAR; INTRAVENOUS at 20:59

## 2022-11-29 RX ADMIN — ENOXAPARIN SODIUM 30 MG: 100 INJECTION SUBCUTANEOUS at 09:22

## 2022-11-29 RX ADMIN — OXYCODONE HYDROCHLORIDE 10 MG: 10 TABLET ORAL at 17:49

## 2022-11-29 RX ADMIN — SODIUM CHLORIDE, PRESERVATIVE FREE 10 ML: 5 INJECTION INTRAVENOUS at 21:05

## 2022-11-29 RX ADMIN — POTASSIUM CHLORIDE 20 MEQ: 20 TABLET, EXTENDED RELEASE ORAL at 09:22

## 2022-11-29 RX ADMIN — PANTOPRAZOLE SODIUM 40 MG: 40 TABLET, DELAYED RELEASE ORAL at 09:22

## 2022-11-29 RX ADMIN — POTASSIUM CHLORIDE 20 MEQ: 20 TABLET, EXTENDED RELEASE ORAL at 17:45

## 2022-11-29 RX ADMIN — GABAPENTIN 100 MG: 100 CAPSULE ORAL at 09:22

## 2022-11-29 RX ADMIN — METHOCARBAMOL 750 MG: 750 TABLET ORAL at 09:22

## 2022-11-29 ASSESSMENT — PAIN DESCRIPTION - LOCATION
LOCATION: LEG
LOCATION: LEG

## 2022-11-29 ASSESSMENT — PAIN SCALES - WONG BAKER
WONGBAKER_NUMERICALRESPONSE: 2
WONGBAKER_NUMERICALRESPONSE: 0

## 2022-11-29 ASSESSMENT — PAIN SCALES - GENERAL
PAINLEVEL_OUTOF10: 0
PAINLEVEL_OUTOF10: 6
PAINLEVEL_OUTOF10: 8
PAINLEVEL_OUTOF10: 4
PAINLEVEL_OUTOF10: 7

## 2022-11-29 ASSESSMENT — PAIN DESCRIPTION - ORIENTATION
ORIENTATION: RIGHT
ORIENTATION: RIGHT

## 2022-11-29 ASSESSMENT — PAIN - FUNCTIONAL ASSESSMENT
PAIN_FUNCTIONAL_ASSESSMENT: ACTIVITIES ARE NOT PREVENTED
PAIN_FUNCTIONAL_ASSESSMENT: PREVENTS OR INTERFERES SOME ACTIVE ACTIVITIES AND ADLS

## 2022-11-29 ASSESSMENT — PAIN DESCRIPTION - DESCRIPTORS
DESCRIPTORS: ACHING;DISCOMFORT;SORE
DESCRIPTORS: SHARP;SORE;ACHING

## 2022-11-29 NOTE — DISCHARGE INSTR - COC
Continuity of Care Form    Patient Name: Claudene Share   :  1945  MRN:  [id number]    Admit date:  2022  Discharge date:  2022    Code Status Order: Full Code   Advance Directives:     Admitting Physician:  Azalea Vargas DO  PCP: Cleo Bosworth, DO    Discharging Nurse: Sterling Bennett RN  Discharging Unit Phone Number: [phone]    Emergency Contact:   Extended Emergency Contact Information  Primary Emergency Contact: Dayana Smith  Mobile Phone: [phone]  Relation: Spouse  Preferred language: English   needed? No    Past Surgical History:  Past Surgical History:   Procedure Laterality Date    FEMUR FRACTURE SURGERY Right 2022    RIGHT PERIPROSTHETIC DISTAL FEMUR OPEN REDUCTION INTERNAL FIXATION performed by Ronn Vazquez DO at [address] [place] [address]       Immunization History: There is no immunization history on file for this patient. Active Problems:  Patient Active Problem List   Diagnosis Code    Other fracture of right femur, initial encounter for closed fracture ([facility] [address] S72.8X1A    Supracondyl fx femur-closed, right, initial encounter ([facility] [address] S72.451A       Isolation/Infection:   Isolation            No Isolation          Patient Infection Status       None to display            Nurse Assessment:  Last Vital Signs: /66   Pulse 68   Temp 97.4 °F (36.3 °C) (Temporal)   Resp 20   Ht 6' 2\" (1.88 m)   Wt 212 lb (96.2 kg)   SpO2 97%   BMI 27.22 kg/m²     Last documented pain score (0-10 scale): Pain Level: 7  Last Weight:   Wt Readings from Last 1 Encounters:   22 212 lb (96.2 kg)     Mental Status:  disoriented    IV Access:  - None    Nursing Mobility/ADLs:  Walking   Dependent  Transfer  Dependent  Bathing  Dependent  Dressing  Dependent  Toileting  Dependent  Feeding  Dependent  Med Admin  Dependent  Med Delivery   none    Wound Care Documentation and Therapy:  Incision 22 Knee Anterior;Right (Active)   Dressing Status Clean;Dry; Intact 22   Incision Cleansed Cleansed with saline 22 1146   Dressing/Treatment Ace wrap;Dry dressing 22   Closure Sutures; Staples 22 0045   Margins Approximated 22 1146   Drainage Amount None 22 1315   Number of days: 3       Incision 22 Hip Anterior;Proximal;Right (Active)   Dressing Status Clean;Dry; Intact 22   Dressing/Treatment Silver dressing; Ace wrap 22   Closure Sutures; Staples 22 0045   Margins Approximated 22 1148   Drainage Amount None 22 1948   Number of days: 3        Elimination:  Continence: Bowel: No  Bladder: parham   Urinary Catheter: Insertion Date: 2022 and Indication for Use of Catheter: Hospice/comfort/palliateive care   Colostomy/Ileostomy/Ileal Conduit: No       Date of Last BM: ***    Intake/Output Summary (Last 24 hours) at 2022 0955  Last data filed at 2022 1921  Gross per 24 hour   Intake 120 ml   Output 300 ml   Net -180 ml     I/O last 3 completed shifts: In: 728 [P.O.:240;  I.V.:488]  Out: 1375 [Urine:1375]    Safety Concerns:     Aspiration Risk    Impairments/Disabilities:      Hospice     Nutrition Therapy:  Current Nutrition Therapy:   - ***    Routes of Feeding: None  Liquids: No Liquids  Daily Fluid Restriction: {P DME Yes amt example:[id number]}  Last Modified Barium Swallow with Video (Video Swallowing Test): {Done Not Done HAYD:[id number]}    Treatments at the Time of Hospital Discharge:   Respiratory Treatments: none  Oxygen Therapy:  2 liters O2  Ventilator:    - No ventilator support    Rehab Therapies: Hospice  Weight Bearing Status/Restrictions: [address] [facility] Weight Bearin}  Other Medical Equipment (for information only, NOT a DME order):  hospital bed  Other Treatments: ***    Patient's personal belongings (please select all that are sent with patient):  {[facility] DME Belongings:[id number]}    RN SIGNATURE:  Electronically signed by Sav Case RN on 22 at 1:34 PM EST    CASE

## 2022-11-29 NOTE — PROGRESS NOTES
Physical Therapy  Rx    Name: Parisa Baer  : 1945  MRN: [id number]      Date of Service: 2022    Evaluating PT:  Evert Zhou, PT, DPT [id number]    Room #:  5572/7441-Z  Diagnosis:  Other fracture of right femur, initial encounter for closed fracture [facility]) [place] Pires  Fall from standing, initial encounter [W19. XXXA]  Supracondyl fx femur-closed, right, initial encounter ([facility] [address] Jessica Simmons  PMHx/PSHx:  None documented  Procedure/Surgery:  RIGHT PERIPROSTHETIC DISTAL FEMUR OPEN REDUCTION INTERNAL FIXATION (2022)  Precautions:  Falls, cognition, NWB RLE, R knee immobilizer (patient to receive R hinged knee brace set at 0-30 degrees), , parham catheter  Equipment Needs:  TBD  Equipment Owned:  Walker    SUBJECTIVE:    Pt is questionable historian. Per chart, patient lives with spouse and used a walker PTA. Pt reports that there are 14 stair(s) with 2 rails to enter home. OBJECTIVE:   Initial Evaluation  Date: 2022 Treatment  2022 Short Term/ Long Term   Goals   AM-PAC 6 Clicks     Was pt agreeable to Eval/treatment? Yes Yes    Does pt have pain? Severe RLE Severe RLE    Bed Mobility  Rolling: Dependent (partial rolling performed)  Supine to sit: NT (attempted; unable to perform)  Sit to supine: NT  Scooting: Dependent x2 Rolling: NT  Supine to sit: Heavy Max A  Sit to supine: Heavy Max A  Scooting: MaxA (seated) Rolling: SBA  Supine to sit: Nadege  Sit to supine: Nadege  Scooting: SBA   Transfers Sit to stand: NT  Stand to sit: NT  Stand pivot: NT Sit to stand: MaxA x2 to fww. Poor ability to NWB RLE  Stand to sit: Max A x 2 to fww  Stand pivot: NT unsafe this date.   Sit to stand: [address]  Stand to sit: ModA  Stand pivot: ModA with AAD   Ambulation   NT NT 3 feet (bed <> chair) with AAD ModA   Stair negotiation: ascended and descended  NT NT TBD   ROM BUE:  See OT note  LLE:  WFL     Strength BUE:  See OT note  LLE:  Grossly 5/5     Balance Sitting EOB:  NT  Dynamic Standing:  NT Sitting EOB:  Nadege initially; progressed to SBA  Dynamic Standing: Max A x 2 Sitting EOB:  SBA  Dynamic Standing:  ModA with AAD     Pt is A & O x 0  Sensation:  No reports of numbness/tingling to extremities  Edema:  Unremarkable    Vitals:   , SpO2 91% (room air) following activity. Nursing notified of SpO2. Patient education  Pt educated on weight bearing status, safety during functional mobility. Patient response to education:   Pt verbalized understanding Pt demonstrated skill Pt requires further education in this area   Yes Yes Yes     ASSESSMENT:    Comments:  Patient in semi-Bui's position with R knee ROM brace donned upon arrival; agreeable to PT session with OT collaboration. Exhibited ability to follow simple conversation. Cues to keep eyes open. Required increased time, assistance of trunk and BLE to perform bed mobility. Exhibited left/posterior lean in sitting, requiring verbal cues and assistance to correct eventually able to sit Min A. Progress to SBA. Tolerated sitting EOB for extended period of time while interdisciplinary care was provided. Multiple sit to stand transfers completed HHA with difficulty maintaining NWB RLE. Returned to supine and off loaded to R side with TAPS. Treatment:  Patient practiced and was instructed in the following treatment:    Bed mobility - verbal cues to facilitate proper positioning and sequencing; physical assistance provided as needed during activity  Static sitting - performed to promote upright tolerance, postural awareness, and balance maintenance  Functional transfers - verbal cues to facilitate proper positioning, particularly related to hand/foot placement, during sit to stand transfer attempts; physical assistance provided as needed during activity  Skilled positioning - patient positioned optimally to protect skin/joint integrity and promote comfort    PLAN:    Patient is making good progress towards established goals.   Will continue with current POC.       Time in  0845  Time out  0910    Total Treatment Time  30 minutes     CPT codes:  [] Gait training 86185 0 minutes  [] Manual therapy [id number] 0 minutes  [x] Therapeutic activities 10385 30 minutes  [] Therapeutic exercises 95400 0 minutes  [] Neuromuscular reeducation [address] 0 minutes  Dee Dee Bird, [address] [facility]

## 2022-11-29 NOTE — PROGRESS NOTES
Pt incontinent and eed the bed. Urine is fowl smelling. Pt refused PO robaxin and potassium. Dr. Syd Silva notified.

## 2022-11-29 NOTE — PROGRESS NOTES
Hospitalist Progress Note      SYNOPSIS: Patient admitted on 2022 for Other fracture of right femur, initial encounter for closed fracture [facility])      SUBJECTIVE:    Patient seen and examined  Records reviewed. Patient is a 76 y.o. male with a past medical history of hypertension, depression, osteoporosis who presents with the chief complaint of right knee pain following a fall from standing height. Patient states that he was taking a step up from his living room to his kitchen and as he went to take a step he felt his knee buckle fell to the ground. Denies any his head denies loss of consciousness. He states he had immediate onset pain to the right knee and subsequently sought medical treatment at [facility]. Was found to have a periprosthetic distal femur fracture. Transferred to [facility] for definitive care and treatment. Status post distal femur ORIF on 2022    Stable overnight. No other overnight issues reported. Temp (24hrs), Av.6 °F (36.4 °C), Min:97.4 °F (36.3 °C), Max:97.8 °F (36.6 °C)    DIET: ADULT DIET; Regular  CODE: Full Code    Intake/Output Summary (Last 24 hours) at 2022 1447  Last data filed at 2022 0902  Gross per 24 hour   Intake 0 ml   Output --   Net 0 ml       OBJECTIVE:    /66   Pulse 68   Temp 97.4 °F (36.3 °C) (Temporal)   Resp 20   Ht 6' 2\" (1.88 m)   Wt 212 lb (96.2 kg)   SpO2 97%   BMI 27.22 kg/m²     General appearance: No apparent distress, appears stated age and cooperative. HEENT:  Conjunctivae/corneas clear. Neck: Supple. No jugular venous distention. Respiratory: Clear to auscultation bilaterally, normal respiratory effort  Cardiovascular: Regular rate rhythm, normal S1-S2  Abdomen: Soft, nontender, nondistended  Musculoskeletal: No clubbing, cyanosis, no bilateral lower extremity edema. Brisk capillary refill.    Skin:  No rashes  on visible skin  Neurologic: awake, alert and following commands     ASSESSMENT:  S/P distal femur ORIF - 11/25/22  CARRINGTON on CKD  Hypotension  Acute on chronic anemia, postoperative blood loss     PLAN:  1. Continue to hold Coreg lisinopril. Blood pressure improved. Continue to evaluation. Hold Lasix. Monitor creatinine closely. Pain control. DISPOSITION: Precertification pending approval for discharge to rehab    Medications:  REVIEWED DAILY    Infusion Medications    sodium chloride      sodium chloride 75 mL/hr at 11/27/22 1804     Scheduled Medications    gabapentin  100 mg Oral TID    enoxaparin  30 mg SubCUTAneous Daily    methocarbamol  750 mg Oral 4x Daily    [Held by provider] carvedilol  12.5 mg Oral Daily    furosemide  20 mg Oral Daily    lamoTRIgine  200 mg Oral Daily    [Held by provider] lisinopril  5 mg Oral Daily    pantoprazole  40 mg Oral QAM AC    venlafaxine  300 mg Oral Daily    trospium  20 mg Oral Nightly    sodium chloride flush  10 mL IntraVENous 2 times per day    potassium chloride  20 mEq Oral BID WC     PRN Meds: benzocaine-menthol, morphine **OR** morphine, oxyCODONE **OR** oxyCODONE, sodium chloride flush, sodium chloride, promethazine **OR** ondansetron, polyethylene glycol, acetaminophen **OR** acetaminophen    Labs:     Recent Labs     11/27/22  0709 11/28/22  0658 11/29/22  0629   WBC 11.9* 9.1 8.8   HGB 9.5* 9.8* 10.5*   HCT 30.3* 30.7* 33.4*    139 191       Recent Labs     11/27/22  0709 11/28/22  0658 11/29/22  0629    142 145   K 3.5 4.4 4.0   * 112* 111*   CO2 21* 19* 19*   BUN 31* 37* 37*   CREATININE 3.1* 3.2* 3.2*   CALCIUM 8.3* 8.8 9.5       No results for input(s): PROT, ALB, ALKPHOS, ALT, AST, BILITOT, AMYLASE, LIPASE in the last 72 hours. No results for input(s): INR in the last 72 hours. No results for input(s): Dena Quevedo in the last 72 hours.     Chronic labs:    Lab Results   Component Value Date    INR 1.2 11/24/2022       Radiology: REVIEWED DAILY    +++++++++++++++++++++++++++++++++++++++++++++++++  Sobeida Sandoval MD  [facility]ician - [address] [place] [address]  +++++++++++++++++++++++++++++++++++++++++++++++++  NOTE: This report was transcribed using voice recognition software. Every effort was made to ensure accuracy; however, inadvertent computerized transcription errors may be present.

## 2022-11-29 NOTE — PROGRESS NOTES
Occupational Therapy  OT BEDSIDE TREATMENT NOTE   [facility] [address]  [address] [place] [address]      [id number]  Patient Name: Brandon Major  MRN: [id number]  : 1945  Room: [address] [place] [address]     Evaluating OT:Antonina Alvarez OTR/KAREN   License #  [id number]        Referring Provider: Milad Otis, DO    Specific Provider Orders/Date: OT evaluation & treatment        Diagnosis:  RIGHT DISTAL FEMUR FRACTURE    Pertinent Medical History:  has no past medical history on file. Surgery: 22: RIGHT PERIPROSTHETIC DISTAL FEMUR OPEN REDUCTION INTERNAL FIXATION       Past Surgical History:  has no past surgical history on file. Precautions:  Fall Risk, NWB R LE, R KI with transition to Hinged brace set 0-30, O2 via NC, parham, bed alarm, cognition, RUE weakness? Assessment of current deficits    [x] Functional mobility            [x]ADLs           [x] Strength                  [x]Cognition    [x] Functional transfers          [x] IADLs         [x] Safety Awareness   [x]Endurance    [x] Fine Coordination                         [x] Balance      [] Vision/perception   [x]Sensation      []Gross Motor Coordination             [] ROM           [] Delirium                   [] Motor Control      OT PLAN OF CARE   OT POC based on physician orders, patient diagnosis and results of clinical assessment     Frequency/Duration: 2-4 days/wk for 2 weeks PRN   Specific OT Treatment Interventions to include:    Instruction/training on adapted ADL techniques and AE recommendations to increase functional independence within precautions  Training on energy conservation strategies, correct breathing pattern and techniques to improve independence/tolerance for self-care routine  Functional transfer/mobility training/DME recommendations for increased independence, safety, and fall prevention  Patient/Family education to increase follow through with safety techniques and functional independence  Recommendation of environmental modifications for increased safety with functional transfers/mobility and ADLs  Cognitive retraining/development of therapeutic activities to improve problem solving, judgement, memory, and attention for increased safety/participation in ADL/IADL tasks  Splinting/positioning for increased function, prevention of contractures, and improve skin integrity  Therapeutic exercise to improve motor endurance, ROM, and functional strength for ADLs/functional transfers  Therapeutic activities to facilitate/challenge dynamic balance, stand tolerance for increased safety and independence with ADLs  Therapeutic activities to facilitate gross/fine motor skills for increased independence with ADLs  Positioning to improve skin integrity, interaction with environment and functional independence     Recommended Adaptive Equipment:  TBD      Home Living: Pt lives with wife in a ?? story with 14?? steps to enter with B HR. B&B on ?? level. Bathroom setup: ?? Equipment owned: piter, gio per chart     Prior Level of Function: ?? with ADLs , ?? with IADLs; ambulated ? ? Driving: ?? Occupation: retired  Comments: Pt. Is questionable historian,  easily distracted, agitated with any movement, inconsistent following commands. Pain Level: severe R LE with movement  Cognition: A&O: 1/4 (self); Follows 1 step directions inconsistently              Memory:  P              Sequencing:  P              Problem solving:  P              Judgement/safety:  P                Functional Assessment:  AM-PAC Daily Activity Raw Score: 11/24    Initial Eval Status  Date: 11-26-22 Treatment Status  Date: 11/29/22 STGs = LTGs  Time frame: 10-14 days   Feeding Max A with cup to mouth Max A  Set up   Grooming Dep Mod A  To wash face seated upright in bed  Set up   UB Dressing Dep  Max A  To don/doff gown seated upright in bed SBA    LB Dressing Dep  socks dependent  Min A   Bathing Dep with sim. task Max A  Seated upright in bed  Min A   Toileting Dep  Max A- hygiene  Pt able to verbalize need to urinate, therapist placed urinal Min A   Bed Mobility  Logroll: Dep x2  Supine to sit: Dep x2 with attempt, unable to achieve  Sit to supine: Dep x2 with attempt, unable to achieve  Max A - supine<->sit  Educated pt on technique to increase independence. Supine to sit: Min A  Sit to supine: Min A   Functional Transfers NT  Pt. deferred Max A x2 - sit<->stand  Cuing for hand placement Mod A   Functional Mobility NT  Pt. deferred N/T  Mod A   Balance Sitting:     Static:  NT    Dynamic:NT  Standing: NT Sitting:     Static:  Max A initially improving to Min A    Dynamic: Max A  Standing: Max A x 2     Activity Tolerance P P+  F   Visual/  Perceptual WFL          Vitals spO2 95% on 2L via NC at rest.  HR: 70 bpm  O2 91% on room air.  WFL       Comments: Upon arrival pt supine in bed. Pt educated on techniques to increase independence and safety during ADL's, bed mobility, and functional transfers. At end of session pt left seated upright in bed, call light within reach, alarm set, environmental adjustments made to increase alertness and orientation. Pt has made fair progress towards set goals.      Continue with current plan of care    Treatment Time In: 8:45            Treatment Time Out: 9:10             Treatment Charges: Mins Units   Ther Ex  23236     Manual Therapy [phone]     Thera Activities [id number] 10 1   ADL/Home Mgt [id number] 15 1   Neuro Re-ed [id number]     Group Therapy      Orthotic manage/training  35602     Non-Billable Time     Total Timed Treatment 25 2     Dennise Hennessy

## 2022-11-29 NOTE — PLAN OF CARE
PT BECAME EXTREMELY AGITATED AND PHYSICALLY AND VERBALLY AGGRESSIVE TOWARDS STAFF AS THEY WERE BATHING HIM AND CHANGING HIS LINENS . PT ALSO SCREAMING OUT FOR HIS WIFE. PT PULLED UP AND REPOSITIONED. BED ALARM ON.

## 2022-11-29 NOTE — CARE COORDINATION
11/29/2022 social work transition of care planning  Pt plan is to Wink to be started today. Sw will complete pas and place envelope in soft chart.   Electronically signed by LALA Baxter on 11/29/2022 at 10:00 AM

## 2022-11-29 NOTE — PLAN OF CARE
PT OBSERVED IN HIS BED SCOOTING DOWN TOWARDS THE BOTTOM OF THE BED WITH HOSPITAL GOWN ON. PT PULLED UP IN BED AND GOWN REAPLLIED. BED ALARM ON.

## 2022-11-30 LAB
BACTERIA: ABNORMAL /HPF
BILIRUBIN URINE: NEGATIVE
BLOOD, URINE: ABNORMAL
CLARITY: CLEAR
COLOR: YELLOW
GLUCOSE URINE: NEGATIVE MG/DL
KETONES, URINE: NEGATIVE MG/DL
LEUKOCYTE ESTERASE, URINE: ABNORMAL
NITRITE, URINE: POSITIVE
PH UA: 6 (ref 5–9)
PROTEIN UA: 100 MG/DL
RBC UA: ABNORMAL /HPF (ref 0–2)
SPECIFIC GRAVITY UA: 1.02 (ref 1–1.03)
UROBILINOGEN, URINE: 0.2 E.U./DL
WBC UA: ABNORMAL /HPF (ref 0–5)

## 2022-11-30 PROCEDURE — 87186 SC STD MICRODIL/AGAR DIL: CPT

## 2022-11-30 PROCEDURE — 1200000000 HC SEMI PRIVATE

## 2022-11-30 PROCEDURE — 6370000000 HC RX 637 (ALT 250 FOR IP): Performed by: PHYSICAL THERAPY ASSISTANT

## 2022-11-30 PROCEDURE — 97530 THERAPEUTIC ACTIVITIES: CPT

## 2022-11-30 PROCEDURE — 81001 URINALYSIS AUTO W/SCOPE: CPT

## 2022-11-30 PROCEDURE — 97535 SELF CARE MNGMENT TRAINING: CPT

## 2022-11-30 PROCEDURE — 87088 URINE BACTERIA CULTURE: CPT

## 2022-11-30 PROCEDURE — 2580000003 HC RX 258: Performed by: FAMILY MEDICINE

## 2022-11-30 PROCEDURE — 6360000002 HC RX W HCPCS: Performed by: PHYSICAL THERAPY ASSISTANT

## 2022-11-30 PROCEDURE — 6370000000 HC RX 637 (ALT 250 FOR IP)

## 2022-11-30 RX ORDER — DEXTROSE AND SODIUM CHLORIDE 5; .9 G/100ML; G/100ML
INJECTION, SOLUTION INTRAVENOUS CONTINUOUS
Status: DISCONTINUED | OUTPATIENT
Start: 2022-11-30 | End: 2022-12-02

## 2022-11-30 RX ADMIN — DEXTROSE AND SODIUM CHLORIDE: 5; 900 INJECTION, SOLUTION INTRAVENOUS at 13:38

## 2022-11-30 RX ADMIN — METHOCARBAMOL 750 MG: 750 TABLET ORAL at 16:49

## 2022-11-30 RX ADMIN — MORPHINE SULFATE 2 MG: 2 INJECTION, SOLUTION INTRAMUSCULAR; INTRAVENOUS at 06:28

## 2022-11-30 RX ADMIN — PANTOPRAZOLE SODIUM 40 MG: 40 TABLET, DELAYED RELEASE ORAL at 06:28

## 2022-11-30 RX ADMIN — TROSPIUM CHLORIDE 20 MG: 20 TABLET, FILM COATED ORAL at 22:19

## 2022-11-30 RX ADMIN — MORPHINE SULFATE 4 MG: 4 INJECTION, SOLUTION INTRAMUSCULAR; INTRAVENOUS at 03:02

## 2022-11-30 RX ADMIN — POTASSIUM CHLORIDE 20 MEQ: 20 TABLET, EXTENDED RELEASE ORAL at 16:49

## 2022-11-30 RX ADMIN — GABAPENTIN 100 MG: 100 CAPSULE ORAL at 22:22

## 2022-11-30 RX ADMIN — METHOCARBAMOL 750 MG: 750 TABLET ORAL at 22:23

## 2022-11-30 ASSESSMENT — PAIN DESCRIPTION - DESCRIPTORS
DESCRIPTORS: ACHING;DISCOMFORT;SORE
DESCRIPTORS: ACHING;DISCOMFORT;SORE

## 2022-11-30 ASSESSMENT — PAIN SCALES - GENERAL
PAINLEVEL_OUTOF10: 4
PAINLEVEL_OUTOF10: 0
PAINLEVEL_OUTOF10: 7

## 2022-11-30 ASSESSMENT — PAIN - FUNCTIONAL ASSESSMENT
PAIN_FUNCTIONAL_ASSESSMENT: ACTIVITIES ARE NOT PREVENTED
PAIN_FUNCTIONAL_ASSESSMENT: ACTIVITIES ARE NOT PREVENTED

## 2022-11-30 ASSESSMENT — PAIN DESCRIPTION - LOCATION
LOCATION: LEG
LOCATION: LEG

## 2022-11-30 ASSESSMENT — PAIN SCALES - WONG BAKER: WONGBAKER_NUMERICALRESPONSE: 0

## 2022-11-30 ASSESSMENT — PAIN DESCRIPTION - ORIENTATION
ORIENTATION: RIGHT
ORIENTATION: RIGHT

## 2022-11-30 NOTE — PLAN OF CARE
PT REFUSED TO EAT BREAKFAST. PT WILL NOT TAKE MEDS HE IS SOMEWHAT IRRITABLE PULLING ON SIDERAIL STATING HE HAS TO GET UP.

## 2022-11-30 NOTE — PLAN OF CARE
INCONTINENT OF URINE PT CLEANSED AND BED PADDING, GOWN AND SHEET CHANGED,PT IS RESISTIVE WITH HANDS ON CARE.

## 2022-11-30 NOTE — PROGRESS NOTES
Occupational Therapy  OT BEDSIDE TREATMENT NOTE   [facility] [address] [facility]  [address] [place] [address]      [id number]  Patient Name: Bijal Lima  MRN: [id number]  : 1945  Room: [address] [place] [address]     Evaluating OT:Antonina Alvarez, OTR/L   License #  [id number]        Referring Provider: Emanuel Uribe DO    Specific Provider Orders/Date: OT evaluation & treatment        Diagnosis:  RIGHT DISTAL FEMUR FRACTURE    Pertinent Medical History:  has no past medical history on file. Surgery: 22: RIGHT PERIPROSTHETIC DISTAL FEMUR OPEN REDUCTION INTERNAL FIXATION       Past Surgical History:  has no past surgical history on file. Precautions:  Fall Risk, NWB R LE, R KI with transition to Hinged brace set 0-30, bed alarm, cognition      Assessment of current deficits    [x] Functional mobility            [x]ADLs           [x] Strength                  [x]Cognition    [x] Functional transfers          [x] IADLs         [x] Safety Awareness   [x]Endurance    [x] Fine Coordination                         [x] Balance      [] Vision/perception   [x]Sensation      []Gross Motor Coordination             [] ROM           [] Delirium                   [] Motor Control      OT PLAN OF CARE   OT POC based on physician orders, patient diagnosis and results of clinical assessment     Frequency/Duration: 2-4 days/wk for 2 weeks PRN   Specific OT Treatment Interventions to include:    Instruction/training on adapted ADL techniques and AE recommendations to increase functional independence within precautions  Training on energy conservation strategies, correct breathing pattern and techniques to improve independence/tolerance for self-care routine  Functional transfer/mobility training/DME recommendations for increased independence, safety, and fall prevention  Patient/Family education to increase follow through with safety techniques and functional independence  Recommendation of environmental modifications for increased safety with functional transfers/mobility and ADLs  Cognitive retraining/development of therapeutic activities to improve problem solving, judgement, memory, and attention for increased safety/participation in ADL/IADL tasks  Splinting/positioning for increased function, prevention of contractures, and improve skin integrity  Therapeutic exercise to improve motor endurance, ROM, and functional strength for ADLs/functional transfers  Therapeutic activities to facilitate/challenge dynamic balance, stand tolerance for increased safety and independence with ADLs  Therapeutic activities to facilitate gross/fine motor skills for increased independence with ADLs  Positioning to improve skin integrity, interaction with environment and functional independence     Recommended Adaptive Equipment:  TBD      Home Living: Pt lives with wife in a ?? story with 14?? steps to enter with B HR. B&B on ?? level. Bathroom setup: ?? Equipment owned: piter, gio per chart     Prior Level of Function: ?? with ADLs , ?? with IADLs; ambulated ? ? Driving: ?? Occupation: retired  Comments: Pt. Is questionable historian,  easily distracted, agitated with any movement, inconsistent following commands. Pain Level: Mod R LE with movement  Cognition: A&O: 1/4 (self); Follows 1 step directions inconsistently              Memory:  P              Sequencing:  P              Problem solving:  P              Judgement/safety:  P                Functional Assessment:  AM-PAC Daily Activity Raw Score: 11/24    Initial Eval Status  Date: 11-26-22 Treatment Status  Date: 11/30/22 STGs = LTGs  Time frame: 10-14 days   Feeding Max A with cup to mouth Max A  Set up   Grooming Dep Mod A  To wash face seated upright in bed  Set up   UB Dressing Dep  Max A  To don/doff gown seated upright in bed SBA    LB Dressing Dep  socks dependent  Min A   Bathing Dep with sim.  task Max A  Per last tx  Min A   Toileting Dep  Max A- hygiene   Min A   Bed Mobility  Logroll: Dep x2  Supine to sit: Dep x2 with attempt, unable to achieve  Sit to supine: Dep x2 with attempt, unable to achieve  Max A - supine<->sit  Educated pt on technique to increase independence. Supine to sit: Min A  Sit to supine: Min A   Functional Transfers NT  Pt. deferred Unable to stand this date with Max A x2  Mod A   Functional Mobility NT  Pt. deferred N/T  Mod A   Balance Sitting:     Static:  NT    Dynamic:NT  Standing: NT Sitting:     Static:  Max A initially improving to Min A    Dynamic: Max A  Standing: N/T     Activity Tolerance P P+  F   Visual/  Perceptual WFL          Vitals spO2 95% on 2L via NC at rest.  HR: 70 bpm   WFL       Comments: Upon arrival pt supine in bed. Pt educated on techniques to increase independence and safety during ADL's, bed mobility, and functional transfers. At end of session pt left seated upright in bed, call light within reach, alarm set, environmental adjustments made to increase alertness and orientation. Pt has made limited progress towards set goals.      Continue with current plan of care    Treatment Time In: 2:17            Treatment Time Out: 2:40             Treatment Charges: Mins Units   Ther Ex  43263     Manual Therapy [address] [facility]     Thera Activities 39585 13 1   ADL/Home Mgt [id number] 10 1   Neuro Re-ed 90537     Group Therapy      Orthotic manage/training  39076     Non-Billable Time     Total Timed Treatment 23 2     [address]

## 2022-11-30 NOTE — PROGRESS NOTES
Physical Therapy  Rx    Name: Rock Jane  : 1945  MRN: [id number]      Date of Service: 2022    Evaluating PT:  Noe Perez, PT, DPT [id number]    Room #:  0008/3266-W  Diagnosis:  Other fracture of right femur, initial encounter for closed fracture [facility]) Alok Jarvis  Fall from standing, initial encounter [W19. XXXA]  Supracondyl fx femur-closed, right, initial encounter ([address] Inocente Franco  PMHx/PSHx:  None documented  Procedure/Surgery:  RIGHT PERIPROSTHETIC DISTAL FEMUR OPEN REDUCTION INTERNAL FIXATION (2022)  Precautions:  Falls, cognition, NWB RLE, (patient to receive R hinged knee brace set at 0-30 degrees), , parham catheter  Equipment Needs:  TBD  Equipment Owned:  Walker    SUBJECTIVE:    Pt is questionable historian. Per chart, patient lives with spouse and used a walker PTA. Pt reports that there are 14 stair(s) with 2 rails to enter home. OBJECTIVE:   Initial Evaluation  Date: 2022 Treatment  2022 Short Term/ Long Term   Goals   AM-PAC 6 Clicks 3/10 1/50    Was pt agreeable to Eval/treatment? Yes Yes    Does pt have pain? Severe RLE R LE       Bed Mobility  Rolling: Dependent (partial rolling performed)  Supine to sit: NT (attempted; unable to perform)  Sit to supine: NT  Scooting: Dependent x2 Rolling: NT  Supine to sit: Heavy Max A 2  Sit to supine: Heavy Max A2   Scooting: MaxA 2(seated) Rolling: SBA  Supine to sit: Nadege  Sit to supine: Nadege  Scooting: SBA   Transfers Sit to stand: NT  Stand to sit: NT  Stand pivot: NT Sit to stand: Not able to stand   Attempted 2 x with assist of 2     Stand pivot: NT unsafe this date.   Sit to stand: [address]  Stand to sit: ModA  Stand pivot: ModA with AAD   Ambulation   NT NT 3 feet (bed <> chair) with AAD ModA   Stair negotiation: ascended and descended  NT NT TBD   ROM BUE:  See OT note  LLE:  WFL     Strength BUE:  See OT note  LLE:  Grossly 5/5     Balance Sitting EOB:  NT  Dynamic Standing:  NT Sitting EOB:  Nadege initially; progressed to SBA/cga   Dynamic Standing: nt  Static stand nt   Sitting EOB:  SBA  Dynamic Standing:  ModA with AAD       Pt is A & O x name  Edema:  nt      Therapeutic Exercises:  nt    Patient education  Pt educated on function    Patient response to education:   Pt verbalized understanding Pt demonstrated skill Pt requires further education in this area   nt nt xx     ASSESSMENT:    Comments:  pt somewhat confused. Performed function as per above. Pt difficult to work with not understanding a lot. Attempted sit to stand and pt not able to stand. Treatment:  Patient practiced and was instructed in the following treatment:    Bed mobility        PLAN:    Patient is making poor progress towards established goals. Will continue with current POC.       Time in  217  Time out  245  Total Treatment Time  28  minutes       CPT codes:  x Therapeutic activities 32034 28  minutes  [] Therapeutic exercises 53472 nt minutes      [facility], [address]

## 2022-11-30 NOTE — PROGRESS NOTES
Comprehensive Nutrition Assessment    Type and Reason for Visit:  Initial, RD Nutrition Re-Screen/LOS    Nutrition Recommendations/Plan:   Continue Diet. Will Start ONS and monitor. If minimal intake continues x next ~1-2d, would then highly recommend to consider EN support d/t minimal intake since adm and refusing multiple meals currently. TF Recommendations:  Diabetic (Glucerna 1.5) @ 60ml/hr (Goal) Continuous x 24hr/d= 1440ml TV, 2160kcal, 119gm Pro, 1093ml freewater. 160ml flush q 4h= 2053ml total water (TF+Flush). Pt at high risk for Re-Feeding Syndrome d/t minimal intake x >7 days - Highly recommend to start at half of goal rate x first 12hrs and increase slowly as tolerated to goal w/ close monitoring of BGL/Lytes Labs and correct PRN both prior to and during initiation of nutrition support. Malnutrition Assessment:  Malnutrition Status: At risk for malnutrition (Comment) (11/30/22 1451)    Context:  Acute Illness     Findings of the 6 clinical characteristics of malnutrition:  Energy Intake:  50% or less of estimated energy requirements for 5 or more days  Weight Loss:  Unable to assess     Body Fat Loss:  Unable to assess     Muscle Mass Loss:  Unable to assess    Fluid Accumulation:  No significant fluid accumulation     Strength:  Not Performed    Nutrition Assessment:    Pt adm w/ RLE pain s/p GLF x ~1-2d pta w/ PMHx HTN, HLD, GERD. Adm w/ Rt Femur Frx s/p ORIF 11/25, CARRINGTON/CKD and now w/ noted concern for possible UTI d/t +AMS/agitation. Pt at nutritional risk d/t increased needs for post-op healing w/ minimal intake since adm d/t poor appetite w/ AMS likely 2/2 possible UTI. Will Start ONS, provide EN recs and monitor.     Nutrition Related Findings:    AMSx2-3, agitation at times, dentition WNL, Abd/BS WDL, +2 RLE edema, +I/O's, +hyperglycemia Wound Type: Surgical Incision, Multiple       Current Nutrition Intake & Therapies:    Average Meal Intake: 1-25%, Refusing to eat (refusing multiple meals at times for last few days per doc flow)  Average Supplements Intake: None Ordered  ADULT DIET; Regular    Anthropometric Measures:  Height: 6' 2\" (188 cm)  Ideal Body Weight (IBW): 190 lbs (86 kg)    Admission Body Weight: 212 lb (96.2 kg) (stated 11/23)  Current Body Weight: 212 lb (96.2 kg) (stated 11/23),   IBW. Weight Source: Stated  Current BMI (kg/m2): 27.2  Usual Body Weight:  (UTO UBW 2/2 poor EMR wt hx pta)     Weight Adjustment For: No Adjustment                 BMI Categories: Overweight (BMI 25.0-29. 9)    Estimated Daily Nutrient Needs:  Energy Requirements Based On: Formula  Weight Used for Energy Requirements: Current  Energy (kcal/day): MSJx1.2SF per CBW=   Weight Used for Protein Requirements: Ideal  Protein (g/day): 1.3-1.5gm/kg IBW= 110-130; as tolerated w/ CARRINGTON/CKD and increased needs  Method Used for Fluid Requirements: 1 ml/kcal  Fluid (ml/day):     Nutrition Diagnosis:   Inadequate oral intake related to cognitive or neurological impairment (2/2 possible UTI) as evidenced by intake 0-25%    Nutrition Interventions:   Food and/or Nutrient Delivery: Continue Current Diet, Start Oral Nutrition Supplement (Continue Diet. Will Start ONS and monitor. If minimal intake continues x next ~1-2d, would then highly recommend to consider EN support. TF Rec:  Diabetic (Glucerna 1.5) @ 60ml/hr (Goal) Continuous x 24hr/d= 1440ml TV, 2160kcal, 119gm Pro, 1093ml freewater.)  Nutrition Education/Counseling: Education not appropriate  Coordination of Nutrition Care: Continue to monitor while inpatient       Goals:     Goals: PO intake 50% or greater, within 2 days       Nutrition Monitoring and Evaluation:   Behavioral-Environmental Outcomes: None Identified  Food/Nutrient Intake Outcomes: Food and Nutrient Intake, Supplement Intake  Physical Signs/Symptoms Outcomes: Biochemical Data, GI Status, Fluid Status or Edema, Nutrition Focused Physical Findings, Skin, Weight    Discharge Planning:     Too soon to determine     Reyna Jiang RD, LD  Contact: ext [phone]

## 2022-11-30 NOTE — PROGRESS NOTES
Hospitalist Progress Note      SYNOPSIS: Patient admitted on 2022 for Other fracture of right femur, initial encounter for closed fracture ([facility] [address]      SUBJECTIVE:    Patient seen and examined. Patient is confused. Daughter and wife at bedside mention that when he had surgery 2 years ago he had similar symptoms with confusion for 7 days and were told it was likely from anesthesia from surgery. Due to this less anesthesia was recommended this surgery and spinal block. Discussed concern for possible UTI and also CARRINGTON. Discussed IV hydration and also obtain urine for urine analysis and culture. Records reviewed. Patient is a 76 y.o. male with a past medical history of hypertension, depression, osteoporosis who presents with the chief complaint of right knee pain following a fall from standing height. Patient states that he was taking a step up from his living room to his kitchen and as he went to take a step he felt his knee buckle fell to the ground. Denies any his head denies loss of consciousness. He states he had immediate onset pain to the right knee and subsequently sought medical treatment at [facility]. Was found to have a periprosthetic distal femur fracture. Transferred to [facility] for definitive care and treatment. Status post distal femur ORIF on 2022. Given altered mental status urinalysis and urine culture has been ordered to rule out UTI. Stable overnight. No other overnight issues reported. Temp (24hrs), Av.5 °F (36.4 °C), Min:97.5 °F (36.4 °C), Max:97.5 °F (36.4 °C)    DIET: ADULT DIET;  Regular  CODE: Full Code  No intake or output data in the 24 hours ending 22 0958    OBJECTIVE:    BP (!) 140/63   Pulse 75   Temp 97.5 °F (36.4 °C) (Temporal)   Resp 16   Ht 6' 2\" (1.88 m)   Wt 212 lb (96.2 kg)   SpO2 95%   BMI 27.22 kg/m²     General appearance: No apparent distress, appears stated age and minimally cooperative. HEENT:  Conjunctivae/corneas clear. Neck: Supple. No jugular venous distention. Respiratory: Clear to auscultation bilaterally, normal respiratory effort  Cardiovascular: Regular rate rhythm, normal S1-S2  Abdomen: Soft, nontender, nondistended  Musculoskeletal: No clubbing, cyanosis, no bilateral lower extremity edema. Brisk capillary refill. Skin:  No rashes  on visible skin  Neurologic: awake but confused and following minimal commands    ASSESSMENT:  S/P distal femur ORIF - 11/25/22  CARRINGTON on CKD  Altered mental status   Hypotension  Acute on chronic anemia, postoperative blood loss     PLAN:  1. Continue to hold Coreg lisinopril. Blood pressure improved. Continue to evaluation. Hold Lasix. Monitor creatinine closely. Creatinine continues to worsen. Nephrology consulted. Pain control. Of note given his change in mental status we will go ahead and order urinalysis and culture to rule out UTI. Blood pressure much improved continue monitor closely. Will change IV fluids to D5 normal saline as patient has not been eating for the last day and a half. Patient had pulled out his previous IV, discussed with bedside nurse of any agitation or pulling on IVs he may need soft restraints.      DISPOSITION:     Medications:  REVIEWED DAILY    Infusion Medications    sodium chloride      sodium chloride 75 mL/hr at 11/27/22 1804     Scheduled Medications    gabapentin  100 mg Oral TID    enoxaparin  30 mg SubCUTAneous Daily    methocarbamol  750 mg Oral 4x Daily    [Held by provider] carvedilol  12.5 mg Oral Daily    furosemide  20 mg Oral Daily    lamoTRIgine  200 mg Oral Daily    [Held by provider] lisinopril  5 mg Oral Daily    pantoprazole  40 mg Oral QAM AC    venlafaxine  300 mg Oral Daily    trospium  20 mg Oral Nightly    sodium chloride flush  10 mL IntraVENous 2 times per day    potassium chloride  20 mEq Oral BID WC     PRN Meds: benzocaine-menthol, morphine **OR** morphine, oxyCODONE **OR** oxyCODONE, sodium chloride flush, sodium chloride, promethazine **OR** ondansetron, polyethylene glycol, acetaminophen **OR** acetaminophen    Labs:     Recent Labs     11/28/22  0658 11/29/22  0629   WBC 9.1 8.8   HGB 9.8* 10.5*   HCT 30.7* 33.4*    191       Recent Labs     11/28/22  0658 11/29/22  0629    145   K 4.4 4.0   * 111*   CO2 19* 19*   BUN 37* 37*   CREATININE 3.2* 3.2*   CALCIUM 8.8 9.5       No results for input(s): PROT, ALB, ALKPHOS, ALT, AST, BILITOT, AMYLASE, LIPASE in the last 72 hours. No results for input(s): INR in the last 72 hours. No results for input(s): Donne Halliday in the last 72 hours. Chronic labs:    Lab Results   Component Value Date    INR 1.2 11/24/2022       Radiology: REVIEWED DAILY    +++++++++++++++++++++++++++++++++++++++++++++++++  Geneva Harris MD  [facility] Physician - [address] [place] [address]  +++++++++++++++++++++++++++++++++++++++++++++++++  NOTE: This report was transcribed using voice recognition software. Every effort was made to ensure accuracy; however, inadvertent computerized transcription errors may be present.

## 2022-11-30 NOTE — PROGRESS NOTES
Student attempted to obtain patient's vital signs. Patient adamantly refused. This nurse attempted 15 minutes later and patient became verbally aggressive, yelling \"get the fuck out of here. \"  Tried to explain need of vital signs and patient continued to swear and be belligerent. RN notified.

## 2022-11-30 NOTE — CARE COORDINATION
11/30/22  Transition of Care Update. Patient admitted for fracture of right femur. Patient is pending pre-cert with [facility]. Call placed Jacquie Im to follow up on pre-cert which is still pending. LUIGI and destination have already been completed. Ambulance is pending. ENZO/SOPHIA to follow for discharge planning once pre-cert is approved.     Electronically signed by LALA Sam on 11/30/2022 at 2:27 PM

## 2022-12-01 LAB
ANION GAP SERPL CALCULATED.3IONS-SCNC: 14 MMOL/L (ref 7–16)
BASOPHILS ABSOLUTE: 0.03 E9/L (ref 0–0.2)
BASOPHILS RELATIVE PERCENT: 0.2 % (ref 0–2)
BUN BLDV-MCNC: 51 MG/DL (ref 6–23)
CALCIUM SERPL-MCNC: 9.1 MG/DL (ref 8.6–10.2)
CHLORIDE BLD-SCNC: 117 MMOL/L (ref 98–107)
CHLORIDE URINE RANDOM: 87 MMOL/L
CO2: 16 MMOL/L (ref 22–29)
CREAT SERPL-MCNC: 3.7 MG/DL (ref 0.7–1.2)
CREATININE URINE: 54 MG/DL (ref 40–278)
CREATININE URINE: 56 MG/DL (ref 40–278)
CREATININE URINE: 56 MG/DL (ref 40–278)
EKG ATRIAL RATE: 88 BPM
EKG P AXIS: 42 DEGREES
EKG P-R INTERVAL: 180 MS
EKG Q-T INTERVAL: 420 MS
EKG QRS DURATION: 80 MS
EKG QTC CALCULATION (BAZETT): 508 MS
EKG R AXIS: 12 DEGREES
EKG T AXIS: 108 DEGREES
EKG VENTRICULAR RATE: 88 BPM
EOSINOPHILS ABSOLUTE: 0.03 E9/L (ref 0.05–0.5)
EOSINOPHILS RELATIVE PERCENT: 0.2 % (ref 0–6)
GFR SERPL CREATININE-BSD FRML MDRD: 16 ML/MIN/1.73
GLUCOSE BLD-MCNC: 237 MG/DL (ref 74–99)
HCT VFR BLD CALC: 33.4 % (ref 37–54)
HEMOGLOBIN: 10.9 G/DL (ref 12.5–16.5)
IMMATURE GRANULOCYTES #: 0.18 E9/L
IMMATURE GRANULOCYTES %: 1.3 % (ref 0–5)
LYMPHOCYTES ABSOLUTE: 0.68 E9/L (ref 1.5–4)
LYMPHOCYTES RELATIVE PERCENT: 4.7 % (ref 20–42)
MCH RBC QN AUTO: 27.9 PG (ref 26–35)
MCHC RBC AUTO-ENTMCNC: 32.6 % (ref 32–34.5)
MCV RBC AUTO: 85.4 FL (ref 80–99.9)
MICROALBUMIN UR-MCNC: 562.4 MG/L
MICROALBUMIN/CREAT UR-RTO: 1004.3 (ref 0–30)
MONOCYTES ABSOLUTE: 1 E9/L (ref 0.1–0.95)
MONOCYTES RELATIVE PERCENT: 7 % (ref 2–12)
NEUTROPHILS ABSOLUTE: 12.43 E9/L (ref 1.8–7.3)
NEUTROPHILS RELATIVE PERCENT: 86.6 % (ref 43–80)
PDW BLD-RTO: 18.7 FL (ref 11.5–15)
PLATELET # BLD: 224 E9/L (ref 130–450)
PMV BLD AUTO: 10.7 FL (ref 7–12)
POTASSIUM REFLEX MAGNESIUM: 4.2 MMOL/L (ref 3.5–5)
PROTEIN PROTEIN: 191 MG/DL (ref 0–12)
PROTEIN/CREAT RATIO: 3.5
PROTEIN/CREAT RATIO: 3.5 (ref 0–0.2)
RBC # BLD: 3.91 E12/L (ref 3.8–5.8)
SODIUM BLD-SCNC: 147 MMOL/L (ref 132–146)
SODIUM URINE: 95 MMOL/L
TOTAL CK: 117 U/L (ref 20–200)
WBC # BLD: 14.4 E9/L (ref 4.5–11.5)

## 2022-12-01 PROCEDURE — 6370000000 HC RX 637 (ALT 250 FOR IP): Performed by: PHYSICAL THERAPY ASSISTANT

## 2022-12-01 PROCEDURE — 93005 ELECTROCARDIOGRAM TRACING: CPT | Performed by: FAMILY MEDICINE

## 2022-12-01 PROCEDURE — 84156 ASSAY OF PROTEIN URINE: CPT

## 2022-12-01 PROCEDURE — 6360000002 HC RX W HCPCS: Performed by: PHYSICAL THERAPY ASSISTANT

## 2022-12-01 PROCEDURE — 6360000002 HC RX W HCPCS

## 2022-12-01 PROCEDURE — 6370000000 HC RX 637 (ALT 250 FOR IP)

## 2022-12-01 PROCEDURE — 6360000002 HC RX W HCPCS: Performed by: FAMILY MEDICINE

## 2022-12-01 PROCEDURE — 2580000003 HC RX 258: Performed by: PHYSICAL THERAPY ASSISTANT

## 2022-12-01 PROCEDURE — 84300 ASSAY OF URINE SODIUM: CPT

## 2022-12-01 PROCEDURE — 80048 BASIC METABOLIC PNL TOTAL CA: CPT

## 2022-12-01 PROCEDURE — 2580000003 HC RX 258: Performed by: FAMILY MEDICINE

## 2022-12-01 PROCEDURE — 82436 ASSAY OF URINE CHLORIDE: CPT

## 2022-12-01 PROCEDURE — 1200000000 HC SEMI PRIVATE

## 2022-12-01 PROCEDURE — 82570 ASSAY OF URINE CREATININE: CPT

## 2022-12-01 PROCEDURE — 85025 COMPLETE CBC W/AUTO DIFF WBC: CPT

## 2022-12-01 PROCEDURE — 51701 INSERT BLADDER CATHETER: CPT

## 2022-12-01 PROCEDURE — 93010 ELECTROCARDIOGRAM REPORT: CPT | Performed by: INTERNAL MEDICINE

## 2022-12-01 PROCEDURE — 82044 UR ALBUMIN SEMIQUANTITATIVE: CPT

## 2022-12-01 PROCEDURE — 36415 COLL VENOUS BLD VENIPUNCTURE: CPT

## 2022-12-01 PROCEDURE — 82550 ASSAY OF CK (CPK): CPT

## 2022-12-01 RX ORDER — LEVOFLOXACIN 5 MG/ML
500 INJECTION, SOLUTION INTRAVENOUS
Status: DISCONTINUED | OUTPATIENT
Start: 2022-12-01 | End: 2022-12-05 | Stop reason: HOSPADM

## 2022-12-01 RX ORDER — HYDRALAZINE HYDROCHLORIDE 20 MG/ML
5 INJECTION INTRAMUSCULAR; INTRAVENOUS EVERY 6 HOURS PRN
Status: DISCONTINUED | OUTPATIENT
Start: 2022-12-01 | End: 2022-12-05 | Stop reason: HOSPADM

## 2022-12-01 RX ORDER — HYDRALAZINE HYDROCHLORIDE 20 MG/ML
5 INJECTION INTRAMUSCULAR; INTRAVENOUS ONCE
Status: COMPLETED | OUTPATIENT
Start: 2022-12-01 | End: 2022-12-01

## 2022-12-01 RX ORDER — LORAZEPAM 2 MG/ML
0.5 INJECTION INTRAMUSCULAR ONCE
Status: COMPLETED | OUTPATIENT
Start: 2022-12-01 | End: 2022-12-01

## 2022-12-01 RX ADMIN — SODIUM CHLORIDE, PRESERVATIVE FREE 10 ML: 5 INJECTION INTRAVENOUS at 08:40

## 2022-12-01 RX ADMIN — TROSPIUM CHLORIDE 20 MG: 20 TABLET, FILM COATED ORAL at 21:33

## 2022-12-01 RX ADMIN — MORPHINE SULFATE 2 MG: 2 INJECTION, SOLUTION INTRAMUSCULAR; INTRAVENOUS at 08:37

## 2022-12-01 RX ADMIN — POTASSIUM CHLORIDE 20 MEQ: 20 TABLET, EXTENDED RELEASE ORAL at 17:55

## 2022-12-01 RX ADMIN — DEXTROSE AND SODIUM CHLORIDE: 5; 900 INJECTION, SOLUTION INTRAVENOUS at 04:44

## 2022-12-01 RX ADMIN — METHOCARBAMOL 750 MG: 750 TABLET ORAL at 17:57

## 2022-12-01 RX ADMIN — GABAPENTIN 100 MG: 100 CAPSULE ORAL at 21:33

## 2022-12-01 RX ADMIN — SODIUM CHLORIDE, PRESERVATIVE FREE 10 ML: 5 INJECTION INTRAVENOUS at 21:34

## 2022-12-01 RX ADMIN — PANTOPRAZOLE SODIUM 40 MG: 40 TABLET, DELAYED RELEASE ORAL at 05:56

## 2022-12-01 RX ADMIN — OXYCODONE HYDROCHLORIDE 10 MG: 10 TABLET ORAL at 21:33

## 2022-12-01 RX ADMIN — LORAZEPAM 0.5 MG: 2 INJECTION INTRAMUSCULAR; INTRAVENOUS at 10:08

## 2022-12-01 RX ADMIN — HYDRALAZINE HYDROCHLORIDE 5 MG: 20 INJECTION INTRAMUSCULAR; INTRAVENOUS at 10:08

## 2022-12-01 RX ADMIN — ENOXAPARIN SODIUM 30 MG: 100 INJECTION SUBCUTANEOUS at 08:37

## 2022-12-01 RX ADMIN — METHOCARBAMOL 750 MG: 750 TABLET ORAL at 21:33

## 2022-12-01 RX ADMIN — OXYCODONE HYDROCHLORIDE 10 MG: 10 TABLET ORAL at 05:55

## 2022-12-01 RX ADMIN — LEVOFLOXACIN 500 MG: 5 INJECTION, SOLUTION INTRAVENOUS at 10:14

## 2022-12-01 ASSESSMENT — PAIN SCALES - GENERAL
PAINLEVEL_OUTOF10: 7
PAINLEVEL_OUTOF10: 0
PAINLEVEL_OUTOF10: 0
PAINLEVEL_OUTOF10: 4
PAINLEVEL_OUTOF10: 7
PAINLEVEL_OUTOF10: 10

## 2022-12-01 ASSESSMENT — PAIN DESCRIPTION - LOCATION
LOCATION: LEG
LOCATION: LEG

## 2022-12-01 ASSESSMENT — PAIN DESCRIPTION - DESCRIPTORS
DESCRIPTORS: ACHING;DISCOMFORT;SORE
DESCRIPTORS: ACHING

## 2022-12-01 ASSESSMENT — PAIN SCALES - WONG BAKER: WONGBAKER_NUMERICALRESPONSE: 0

## 2022-12-01 ASSESSMENT — PAIN DESCRIPTION - PAIN TYPE: TYPE: SURGICAL PAIN

## 2022-12-01 ASSESSMENT — PAIN DESCRIPTION - ORIENTATION
ORIENTATION: RIGHT;LEFT
ORIENTATION: RIGHT

## 2022-12-01 ASSESSMENT — PAIN DESCRIPTION - FREQUENCY: FREQUENCY: INTERMITTENT

## 2022-12-01 NOTE — PROGRESS NOTES
Message to Dr. Jennifer Mcfadden:  Patient is dehydratred,  has had small amount of urine output last at 1100, none since, skin tenting,  lab having difficulty drawing labs for aPPT. Dr. Bud Couch ordered bolus of 0.9%NS and then 100ml/hr continuous and advised to follow up with her attending. Awaiting reply. Dr. Bud Couch in room,  advised of failed attempts at aPPT for heparin adjustment.

## 2022-12-01 NOTE — PLAN OF CARE
PT IS AGITATED REFUSING PO MEDS\" GET THE HELL OUT OF HERE\" PULLING ON SIDERAILS \" GET ME OUT OF THIS CAR\" BED ALARM ON.

## 2022-12-01 NOTE — CARE COORDINATION
12/1/2022 social work transition of care planning  Pt plan is to [facility] once [place] obtained. Will need covid test day of discharge. pas completed envelope in soft chart. Electronically signed by LALA Pizano on 12/1/2022 at 7:47 AM    Addendum:Javon notified that [facility] declined. Javon spoke with pt's spouse choice is [place]. Sw made referral.  Electronically signed by LALA Pizano on 12/1/2022 at 11:23 AM    Addendum:Javon notified that [facility] ([address])can accept. Jaovn requested precert be started today. Javon will update pasar and envelope. Will need covid test day of discharge.   Electronically signed by LALA Pizano on 12/1/2022 at 1:21 PM

## 2022-12-01 NOTE — CONSULTS
[facility] MD Stanley Hansen MD ALI Melecio Ohs MD .  Consultation  Patient's Name: Cecilia Peterson  3:34 PM  12/1/2022    CHARTS REVIEWED  STABLE VITALS    History of Present Ilness:      75 y/o M presenting with R knee pain   Pt has hx of HTN OA CKD   Pt is being admitted with diangosis of periprosthetic distal femur fracture   Pt seen in his room he is a poor historian , he is on RA he appear euvolemic   Pt presented with cr of 2 . Cr up to 3.2   When asking pt does he have kidney disease he ll I think so . When asking him do you follow with a kidney speciliast his response (on multiple tries ) was I do no think so . History reviewed. No pertinent past medical history. Past Surgical History:   Procedure Laterality Date    FEMUR FRACTURE SURGERY Right 11/25/2022    RIGHT PERIPROSTHETIC DISTAL FEMUR OPEN REDUCTION INTERNAL FIXATION performed by Oswaldo Tai DO at [facility] OR       No family history on file. reports that he has an unknown smoking status.  He has never used smokeless tobacco.    Allergies:  Ceftriaxone    Current Medications:    hydrALAZINE (APRESOLINE) injection 5 mg, Q6H PRN  levoFLOXacin (LEVAQUIN) 500 MG/100ML infusion 500 mg, Q48H  trimethobenzamide (TIGAN) injection 200 mg, Q6H PRN  dextrose 5 % and 0.9 % sodium chloride infusion, Continuous  gabapentin (NEURONTIN) capsule 100 mg, TID  enoxaparin Sodium (LOVENOX) injection 30 mg, Daily  methocarbamol (ROBAXIN) tablet 750 mg, 4x Daily  benzocaine-menthol (CEPACOL SORE THROAT) lozenge 1 lozenge, Q2H PRN  morphine (PF) injection 2 mg, Q2H PRN   Or  morphine sulfate (PF) injection 4 mg, Q2H PRN  oxyCODONE (ROXICODONE) immediate release tablet 5 mg, Q4H PRN   Or  oxyCODONE HCl (OXY-IR) immediate release tablet 10 mg, Q4H PRN  [Held by provider] carvedilol (COREG) tablet 12.5 mg, Daily  [Held by provider] furosemide (LASIX) tablet 20 mg, Daily  lamoTRIgine (LAMICTAL) tablet 200 mg, Daily  [Held by provider] lisinopril (PRINIVIL;ZESTRIL) tablet 5 mg, Daily  pantoprazole (PROTONIX) tablet 40 mg, QAM AC  venlafaxine (EFFEXOR) tablet 300 mg, Daily  trospium (SANCTURA) tablet 20 mg, Nightly  sodium chloride flush 0.9 % injection 10 mL, 2 times per day  sodium chloride flush 0.9 % injection 10 mL, PRN  0.9 % sodium chloride infusion, PRN  polyethylene glycol (GLYCOLAX) packet 17 g, Daily PRN  acetaminophen (TYLENOL) tablet 650 mg, Q6H PRN   Or  acetaminophen (TYLENOL) suppository 650 mg, Q6H PRN  potassium chloride (KLOR-CON M) extended release tablet 20 mEq, BID WC      Review of Systems:   Constitutional: no fevers , no chills , feels ok   Eyes: no eye pain , no itching , no drainage  Ears, nose, mouth, throat, and face: no ear ,nose pain , hearing is ok ,no nasal drainage   Respiratory: no sob ,no cough ,no wheezing . Cardiovascular: no chest pain , no palpitation ,no sob . Gastrointestinal: no nausea, vomiting , constipation , no abdominal pain . Genitourinary:no urinary retention , no burning , dysuria . No polyuria   Hematologic/lymphatic: no bleeding , no cougulation issues . Musculoskeletal:no joint pain , no swelling . Neurological: no headaches ,no weakness , no numbness . Endocrine: no thirst , no weight issues . Physical exam:   Vital signs BP (!) 102/54   Pulse 85   Temp 98.4 °F (36.9 °C) (Temporal)   Resp 16   Ht 6' 2\" (1.88 m)   Wt 212 lb (96.2 kg)   SpO2 93%   BMI 27.22 kg/m²   Gen : NAD , appropriate for stated age . Head : at , nc   Neck : supple , no thyromegaly noted . Eyes : EOMI , PERRLA   CV : RRR , No M/R/G . Lungs: CTAB , no wheezing , good flow heard b/l   Abd : soft , NT , BS + , No Organomegaly appreciated . Skin : soft, dry . Neuro : CN  II-XII grossly intact , no focal neurologic deficit . Psych : cooperative .      Data:   Labs:  CBC with Differential:    Lab Results   Component Value Date/Time    WBC 8.8 11/29/2022 06:29 AM    RBC 3.76 11/29/2022 06:29 AM    HGB 10.5 11/29/2022 06:29 AM    HCT 33.4 11/29/2022 06:29 AM     11/29/2022 06:29 AM    MCV 88.8 11/29/2022 06:29 AM    MCH 27.9 11/29/2022 06:29 AM    MCHC 31.4 11/29/2022 06:29 AM    RDW 18.7 11/29/2022 06:29 AM    LYMPHOPCT 14.8 11/24/2022 05:28 AM    MONOPCT 13.0 11/24/2022 05:28 AM    BASOPCT 0.9 11/24/2022 05:28 AM    MONOSABS 1.40 11/24/2022 05:28 AM    LYMPHSABS 1.62 11/24/2022 05:28 AM    EOSABS 0.10 11/24/2022 05:28 AM    BASOSABS 0.10 11/24/2022 05:28 AM     CMP:    Lab Results   Component Value Date/Time     11/29/2022 06:29 AM    K 4.0 11/29/2022 06:29 AM    K 3.4 11/24/2022 05:28 AM     11/29/2022 06:29 AM    CO2 19 11/29/2022 06:29 AM    BUN 37 11/29/2022 06:29 AM    CREATININE 3.2 11/29/2022 06:29 AM    LABGLOM 19 11/29/2022 06:29 AM    GLUCOSE 143 11/29/2022 06:29 AM    PROT 6.5 11/24/2022 05:28 AM    LABALBU 3.4 11/24/2022 05:28 AM    CALCIUM 9.5 11/29/2022 06:29 AM    BILITOT 0.5 11/24/2022 05:28 AM    ALKPHOS 100 11/24/2022 05:28 AM    AST 19 11/24/2022 05:28 AM    ALT 19 11/24/2022 05:28 AM     Ionized Calcium:  No results found for: IONCA  Magnesium:    Lab Results   Component Value Date/Time    MG 2.3 11/24/2022 05:28 AM     Phosphorus:  No results found for: PHOS  U/A:    Lab Results   Component Value Date/Time    COLORU Yellow 11/30/2022 11:59 AM    PHUR 6.0 11/30/2022 11:59 AM    WBCUA 10-20 11/30/2022 11:59 AM    RBCUA 10-20 11/30/2022 11:59 AM    BACTERIA MODERATE 11/30/2022 11:59 AM    CLARITYU Clear 11/30/2022 11:59 AM    SPECGRAV 1.025 11/30/2022 11:59 AM    LEUKOCYTESUR MODERATE 11/30/2022 11:59 AM    UROBILINOGEN 0.2 11/30/2022 11:59 AM    BILIRUBINUR Negative 11/30/2022 11:59 AM    BLOODU LARGE 11/30/2022 11:59 AM    GLUCOSEU Negative 11/30/2022 11:59 AM     Microalbumen/Creatinine ratio:  No components found for: RUCREAT  Iron Saturation:  No components found for: PERCENTFE  TIBC:  No results found for: TIBC  FERRITIN:  No results found for: FERRITIN Imaging:  XR FEMUR RIGHT (MIN 2 VIEWS)   Final Result   Status post right femoral ORIF with improved anatomic alignment spanning the   right distal femoral fracture with expected postsurgical changes in the soft   tissues         FLUORO FOR SURGICAL PROCEDURES   Final Result   Intraprocedural fluoroscopic spot images as above. See separate procedure   report for more information. XR CHEST 1 VIEW   Final Result   No acute cardiopulmonary process. CT FEMUR RIGHT WO CONTRAST   Final Result   1. Supracondylar region femoral fracture with displacement, immediately   cephalad to the knee replacement. 2. Further details and incidental findings are described above. XR KNEE RIGHT (1-2 VIEWS)   Final Result   Angulated and impacted supracondylar fracture. XR FEMUR RIGHT (MIN 2 VIEWS)   Final Result   Angulated and impacted supracondylar fracture. US RETROPERITONEAL LIMITED    (Results Pending)       Assessment    -Acute kidney injury     -chronic kidney disease stage III (baseline cr unknown )     -Femur neck fracture     -Hypertension     -UTI     -Microscopic hematuria     Plan :     -check urine lytes  -hold lasix hold lisinopril   -continue conservative iv fluids   -US of the kidney   -check ck   -treat UTI . If hematuria persist post treating UTI then will need urology evaluation . -bmp asher .          Electronically signed by Dante Melendrez MD on 12/1/2022 at 3:34 PM

## 2022-12-01 NOTE — PROGRESS NOTES
Hospitalist Progress Note      SYNOPSIS: Patient admitted on 2022 for Other fracture of right femur, initial encounter for closed fracture ([facility] [address]      SUBJECTIVE:    Patient seen and examined. Received Ativan and sleeping. Records reviewed. Patient seen and examined. Patient is confused. Daughter and wife at bedside mention that when he had surgery 2 years ago he had similar symptoms with confusion for 7 days and were told it was likely from anesthesia from surgery. Due to this less anesthesia was recommended this surgery and spinal block. Discussed concern for possible UTI and also CARRINGTON. Discussed IV hydration and also obtain urine for urine analysis and culture. Records reviewed. Patient is a 76 y.o. male with a past medical history of hypertension, depression, osteoporosis who presents with the chief complaint of right knee pain following a fall from standing height. Patient states that he was taking a step up from his living room to his kitchen and as he went to take a step he felt his knee buckle fell to the ground. Denies any his head denies loss of consciousness. He states he had immediate onset pain to the right knee and subsequently sought medical treatment at [facility]. Was found to have a periprosthetic distal femur fracture. Transferred to [facility] for definitive care and treatment. Status post distal femur ORIF on 2022. Given altered mental status urinalysis and urine culture has been ordered to rule out UTI. Urine culture with E.coli. Stable overnight. No other overnight issues reported. Temp (24hrs), Av.4 °F (36.9 °C), Min:98 °F (36.7 °C), Max:98.7 °F (37.1 °C)    DIET: ADULT DIET; Regular  ADULT ORAL NUTRITION SUPPLEMENT; Breakfast, Lunch, Dinner; Diabetic Oral Supplement  ADULT ORAL NUTRITION SUPPLEMENT; Breakfast, Dinner;  Wound Healing Oral Supplement  CODE: Full Code    Intake/Output Summary (Last 24 hours) at 12/1/2022 1519  Last data filed at 12/1/2022 1047  Gross per 24 hour   Intake 1276 ml   Output --   Net 1276 ml       OBJECTIVE:    BP (!) 102/54   Pulse 85   Temp 98.4 °F (36.9 °C) (Temporal)   Resp 16   Ht 6' 2\" (1.88 m)   Wt 212 lb (96.2 kg)   SpO2 93%   BMI 27.22 kg/m²     General appearance: No apparent distress, appears stated age . HEENT:  Conjunctivae/corneas clear. Neck: Supple. No jugular venous distention. Respiratory: Clear to auscultation bilaterally, normal respiratory effort  Cardiovascular: Regular rate rhythm, normal S1-S2  Abdomen: Soft, nontender, nondistended  Musculoskeletal: No clubbing, cyanosis, no bilateral lower extremity edema. Brisk capillary refill. Skin:  No rashes  on visible skin  Neurologic: sleeping and following commands     ASSESSMENT:  S/P distal femur ORIF - 11/25/22  CARRINGTON on CKD  Altered mental status  secondary to UTI  E.coli UTI  Hypotension  Acute on chronic anemia, postoperative blood loss     PLAN:  Urinalysis is concerning for urinary tract infection. Urine culture with E. coli. Given his allergies with anaphylaxis to ceftriaxone we will start him on Levaquin q48h renally dosed. Concern for his QTC of 508 on EKG. EKG in AM. Discussed with pharmacy concerning antibiotics option but limited given no oral intake. He was agitated today given this he was given low-dose of Ativan 0.5 IV push. Appreciate nephrology's input concerning CARRINGTON. Continue D5 fluid. renal US pending.     DISPOSITION:     Medications:  REVIEWED DAILY    Infusion Medications    dextrose 5 % and 0.9 % NaCl 100 mL/hr at 12/01/22 0444    sodium chloride       Scheduled Medications    levofloxacin  500 mg IntraVENous Q48H    gabapentin  100 mg Oral TID    enoxaparin  30 mg SubCUTAneous Daily    methocarbamol  750 mg Oral 4x Daily    [Held by provider] carvedilol  12.5 mg Oral Daily    [Held by provider] furosemide  20 mg Oral Daily    lamoTRIgine  200 mg Oral Daily    [Held by provider] lisinopril  5 mg Oral Daily    pantoprazole  40 mg Oral QAM AC    venlafaxine  300 mg Oral Daily    trospium  20 mg Oral Nightly    sodium chloride flush  10 mL IntraVENous 2 times per day    potassium chloride  20 mEq Oral BID WC     PRN Meds: hydrALAZINE, benzocaine-menthol, morphine **OR** morphine, oxyCODONE **OR** oxyCODONE, sodium chloride flush, sodium chloride, promethazine **OR** ondansetron, polyethylene glycol, acetaminophen **OR** acetaminophen    Labs:     Recent Labs     11/29/22 0629   WBC 8.8   HGB 10.5*   HCT 33.4*          Recent Labs     11/29/22 0629      K 4.0   *   CO2 19*   BUN 37*   CREATININE 3.2*   CALCIUM 9.5       No results for input(s): PROT, ALB, ALKPHOS, ALT, AST, BILITOT, AMYLASE, LIPASE in the last 72 hours. No results for input(s): INR in the last 72 hours. No results for input(s): Cynthia Earnest in the last 72 hours. Chronic labs:    Lab Results   Component Value Date    INR 1.2 11/24/2022       Radiology: REVIEWED DAILY    +++++++++++++++++++++++++++++++++++++++++++++++++  Nelly Duckworth MD  [facility] Physician - [address] [place] [address]  +++++++++++++++++++++++++++++++++++++++++++++++++  NOTE: This report was transcribed using voice recognition software. Every effort was made to ensure accuracy; however, inadvertent computerized transcription errors may be present.

## 2022-12-01 NOTE — CONSULTS
[facility] MD Rohith Oliver Chi, MD Parris Mcalpine, DO, [address] .  Consultation  Patient's Name: Vikki Shearer  12:38 PM  12/1/2022    Nephrologist: Scarlet Cantu MD    Reason for Consult:  CKD IV  Requesting Physician:  Jayde Sifuentes DO    Chief Complaint:  R knee pain     History of Present Ilness:      77 y/o M presenting with R knee pain   Pt has hx of HTN OA CKD   Pt is being admitted with diangosis of periprosthetic distal femur fracture   Pt seen in his room he is a poor historian , he is on RA he appear euvolemic   Pt presented with cr of 2 . Cr up to 3.2   When asking pt does he have kidney disease he ll I think so . When asking him do you follow with a kidney speciliast his response (on multiple tries ) was I do no think so . History reviewed. No pertinent past medical history. Past Surgical History:   Procedure Laterality Date    FEMUR FRACTURE SURGERY Right 11/25/2022    RIGHT PERIPROSTHETIC DISTAL FEMUR OPEN REDUCTION INTERNAL FIXATION performed by Alexander Palmer DO at [facility] OR       No family history on file. reports that he has an unknown smoking status.  He has never used smokeless tobacco.    Allergies:  Ceftriaxone    Current Medications:    hydrALAZINE (APRESOLINE) injection 5 mg, Q6H PRN  levoFLOXacin (LEVAQUIN) 500 MG/100ML infusion 500 mg, Q48H  dextrose 5 % and 0.9 % sodium chloride infusion, Continuous  gabapentin (NEURONTIN) capsule 100 mg, TID  enoxaparin Sodium (LOVENOX) injection 30 mg, Daily  methocarbamol (ROBAXIN) tablet 750 mg, 4x Daily  benzocaine-menthol (CEPACOL SORE THROAT) lozenge 1 lozenge, Q2H PRN  morphine (PF) injection 2 mg, Q2H PRN   Or  morphine sulfate (PF) injection 4 mg, Q2H PRN  oxyCODONE (ROXICODONE) immediate release tablet 5 mg, Q4H PRN   Or  oxyCODONE HCl (OXY-IR) immediate release tablet 10 mg, Q4H PRN  [Held by provider] carvedilol (COREG) tablet 12.5 mg, Daily  furosemide (LASIX) tablet 20 mg, Daily  lamoTRIgine (LAMICTAL) tablet 200 mg, Daily  [Held by provider] lisinopril (PRINIVIL;ZESTRIL) tablet 5 mg, Daily  pantoprazole (PROTONIX) tablet 40 mg, QAM AC  venlafaxine (EFFEXOR) tablet 300 mg, Daily  trospium (SANCTURA) tablet 20 mg, Nightly  sodium chloride flush 0.9 % injection 10 mL, 2 times per day  sodium chloride flush 0.9 % injection 10 mL, PRN  0.9 % sodium chloride infusion, PRN  promethazine (PHENERGAN) tablet 12.5 mg, Q6H PRN   Or  ondansetron (ZOFRAN) injection 4 mg, Q6H PRN  polyethylene glycol (GLYCOLAX) packet 17 g, Daily PRN  acetaminophen (TYLENOL) tablet 650 mg, Q6H PRN   Or  acetaminophen (TYLENOL) suppository 650 mg, Q6H PRN  potassium chloride (KLOR-CON M) extended release tablet 20 mEq, BID WC        Review of Systems:   Constitutional: no fevers , no chills , feels ok   Eyes: no eye pain , no itching , no drainage  Ears, nose, mouth, throat, and face: no ear ,nose pain , hearing is ok ,no nasal drainage   Respiratory: no sob ,no cough ,no wheezing . Cardiovascular: no chest pain , no palpitation ,no sob . Gastrointestinal: no nausea, vomiting , constipation , no abdominal pain . Genitourinary:no urinary retention , no burning , dysuria . No polyuria   Hematologic/lymphatic: no bleeding , no cougulation issues . Musculoskeletal:no joint pain , no swelling . Neurological: no headaches ,no weakness , no numbness . Endocrine: no thirst , no weight issues . Physical exam:   Vital signs BP (!) 156/79   Pulse 87   Temp 98 °F (36.7 °C) (Temporal)   Resp 16   Ht 6' 2\" (1.88 m)   Wt 212 lb (96.2 kg)   SpO2 97%   BMI 27.22 kg/m²   Gen : NAD , appropriate for stated age . Head : at , nc   Neck : supple , no thyromegaly noted . Eyes : EOMI , PERRLA   CV : RRR , No M/R/G . Lungs: CTAB , no wheezing , good flow heard b/l   Abd : soft , NT , BS + , No Organomegaly appreciated . Skin : soft, dry . Neuro : CN  II-XII grossly intact , no focal neurologic deficit . Psych : cooperative .      Data:   Labs:  CBC with Differential:    Lab Results   Component Value Date/Time    WBC 8.8 11/29/2022 06:29 AM    RBC 3.76 11/29/2022 06:29 AM    HGB 10.5 11/29/2022 06:29 AM    HCT 33.4 11/29/2022 06:29 AM     11/29/2022 06:29 AM    MCV 88.8 11/29/2022 06:29 AM    MCH 27.9 11/29/2022 06:29 AM    MCHC 31.4 11/29/2022 06:29 AM    RDW 18.7 11/29/2022 06:29 AM    LYMPHOPCT 14.8 11/24/2022 05:28 AM    MONOPCT 13.0 11/24/2022 05:28 AM    BASOPCT 0.9 11/24/2022 05:28 AM    MONOSABS 1.40 11/24/2022 05:28 AM    LYMPHSABS 1.62 11/24/2022 05:28 AM    EOSABS 0.10 11/24/2022 05:28 AM    BASOSABS 0.10 11/24/2022 05:28 AM     CMP:    Lab Results   Component Value Date/Time     11/29/2022 06:29 AM    K 4.0 11/29/2022 06:29 AM    K 3.4 11/24/2022 05:28 AM     11/29/2022 06:29 AM    CO2 19 11/29/2022 06:29 AM    BUN 37 11/29/2022 06:29 AM    CREATININE 3.2 11/29/2022 06:29 AM    LABGLOM 19 11/29/2022 06:29 AM    GLUCOSE 143 11/29/2022 06:29 AM    PROT 6.5 11/24/2022 05:28 AM    LABALBU 3.4 11/24/2022 05:28 AM    CALCIUM 9.5 11/29/2022 06:29 AM    BILITOT 0.5 11/24/2022 05:28 AM    ALKPHOS 100 11/24/2022 05:28 AM    AST 19 11/24/2022 05:28 AM    ALT 19 11/24/2022 05:28 AM     Ionized Calcium:  No results found for: IONCA  Magnesium:    Lab Results   Component Value Date/Time    MG 2.3 11/24/2022 05:28 AM     Phosphorus:  No results found for: PHOS  U/A:    Lab Results   Component Value Date/Time    COLORU Yellow 11/30/2022 11:59 AM    PHUR 6.0 11/30/2022 11:59 AM    WBCUA 10-20 11/30/2022 11:59 AM    RBCUA 10-20 11/30/2022 11:59 AM    BACTERIA MODERATE 11/30/2022 11:59 AM    CLARITYU Clear 11/30/2022 11:59 AM    SPECGRAV 1.025 11/30/2022 11:59 AM    LEUKOCYTESUR MODERATE 11/30/2022 11:59 AM    UROBILINOGEN 0.2 11/30/2022 11:59 AM    BILIRUBINUR Negative 11/30/2022 11:59 AM    BLOODU LARGE 11/30/2022 11:59 AM    GLUCOSEU Negative 11/30/2022 11:59 AM     Microalbumen/Creatinine ratio:  No components found for: RUCREAT  Iron Saturation:  No components found for: PERCENTFE  TIBC:  No results found for: TIBC  FERRITIN:  No results found for: FERRITIN     Imaging:  XR FEMUR RIGHT (MIN 2 VIEWS)   Final Result   Status post right femoral ORIF with improved anatomic alignment spanning the   right distal femoral fracture with expected postsurgical changes in the soft   tissues         FLUORO FOR SURGICAL PROCEDURES   Final Result   Intraprocedural fluoroscopic spot images as above. See separate procedure   report for more information. XR CHEST 1 VIEW   Final Result   No acute cardiopulmonary process. CT FEMUR RIGHT WO CONTRAST   Final Result   1. Supracondylar region femoral fracture with displacement, immediately   cephalad to the knee replacement. 2. Further details and incidental findings are described above. XR KNEE RIGHT (1-2 VIEWS)   Final Result   Angulated and impacted supracondylar fracture. XR FEMUR RIGHT (MIN 2 VIEWS)   Final Result   Angulated and impacted supracondylar fracture. Assessment    -Acute kidney injury     -chronic kidney disease stage III (baseline cr unknown )     -Femur neck fracture     -Hypertension     -UTI     -Microscopic hematuria     Plan :     -check urine lytes  -hold lasix hold lisinopril   -continue conservative iv fluids   -US of the kidney   -check ck   -treat UTI . If hematuria persist post treating UTI then will need urology evaluation . -bmp asher .      Thank you       Electronically signed by Michael Sandhu MD on 12/1/2022 at 12:38 PM

## 2022-12-02 ENCOUNTER — APPOINTMENT (OUTPATIENT)
Dept: ULTRASOUND IMAGING | Age: 77
DRG: 480 | End: 2022-12-02
Payer: MEDICARE

## 2022-12-02 ENCOUNTER — APPOINTMENT (OUTPATIENT)
Dept: GENERAL RADIOLOGY | Age: 77
DRG: 480 | End: 2022-12-02
Payer: MEDICARE

## 2022-12-02 LAB
ANION GAP SERPL CALCULATED.3IONS-SCNC: 12 MMOL/L (ref 7–16)
ANION GAP SERPL CALCULATED.3IONS-SCNC: 13 MMOL/L (ref 7–16)
ANION GAP SERPL CALCULATED.3IONS-SCNC: 13 MMOL/L (ref 7–16)
BASOPHILS ABSOLUTE: 0.07 E9/L (ref 0–0.2)
BASOPHILS RELATIVE PERCENT: 0.6 % (ref 0–2)
BUN BLDV-MCNC: 56 MG/DL (ref 6–23)
BUN BLDV-MCNC: 61 MG/DL (ref 6–23)
BUN BLDV-MCNC: 63 MG/DL (ref 6–23)
CALCIUM SERPL-MCNC: 9.4 MG/DL (ref 8.6–10.2)
CALCIUM SERPL-MCNC: 9.5 MG/DL (ref 8.6–10.2)
CALCIUM SERPL-MCNC: 9.7 MG/DL (ref 8.6–10.2)
CHLORIDE BLD-SCNC: 117 MMOL/L (ref 98–107)
CHLORIDE BLD-SCNC: 119 MMOL/L (ref 98–107)
CHLORIDE BLD-SCNC: 122 MMOL/L (ref 98–107)
CO2: 17 MMOL/L (ref 22–29)
CREAT SERPL-MCNC: 3.9 MG/DL (ref 0.7–1.2)
CREAT SERPL-MCNC: 4.5 MG/DL (ref 0.7–1.2)
CREAT SERPL-MCNC: 4.9 MG/DL (ref 0.7–1.2)
EOSINOPHILS ABSOLUTE: 0.12 E9/L (ref 0.05–0.5)
EOSINOPHILS RELATIVE PERCENT: 1.1 % (ref 0–6)
GFR SERPL CREATININE-BSD FRML MDRD: 11 ML/MIN/1.73
GFR SERPL CREATININE-BSD FRML MDRD: 13 ML/MIN/1.73
GFR SERPL CREATININE-BSD FRML MDRD: 15 ML/MIN/1.73
GLUCOSE BLD-MCNC: 153 MG/DL (ref 74–99)
GLUCOSE BLD-MCNC: 210 MG/DL (ref 74–99)
GLUCOSE BLD-MCNC: 256 MG/DL (ref 74–99)
HCT VFR BLD CALC: 34.1 % (ref 37–54)
HEMOGLOBIN: 10.5 G/DL (ref 12.5–16.5)
IMMATURE GRANULOCYTES #: 0.24 E9/L
IMMATURE GRANULOCYTES %: 2.2 % (ref 0–5)
LYMPHOCYTES ABSOLUTE: 0.82 E9/L (ref 1.5–4)
LYMPHOCYTES RELATIVE PERCENT: 7.6 % (ref 20–42)
MCH RBC QN AUTO: 27.8 PG (ref 26–35)
MCHC RBC AUTO-ENTMCNC: 30.8 % (ref 32–34.5)
MCV RBC AUTO: 90.2 FL (ref 80–99.9)
MONOCYTES ABSOLUTE: 0.81 E9/L (ref 0.1–0.95)
MONOCYTES RELATIVE PERCENT: 7.5 % (ref 2–12)
NEUTROPHILS ABSOLUTE: 8.75 E9/L (ref 1.8–7.3)
NEUTROPHILS RELATIVE PERCENT: 81 % (ref 43–80)
ORGANISM: ABNORMAL
PDW BLD-RTO: 19.4 FL (ref 11.5–15)
PLATELET # BLD: 186 E9/L (ref 130–450)
PMV BLD AUTO: 10.4 FL (ref 7–12)
POTASSIUM REFLEX MAGNESIUM: 4.1 MMOL/L (ref 3.5–5)
POTASSIUM SERPL-SCNC: 4.1 MMOL/L (ref 3.5–5)
POTASSIUM SERPL-SCNC: 4.4 MMOL/L (ref 3.5–5)
RBC # BLD: 3.78 E12/L (ref 3.8–5.8)
SODIUM BLD-SCNC: 147 MMOL/L (ref 132–146)
SODIUM BLD-SCNC: 148 MMOL/L (ref 132–146)
SODIUM BLD-SCNC: 152 MMOL/L (ref 132–146)
URINE CULTURE, ROUTINE: ABNORMAL
WBC # BLD: 10.8 E9/L (ref 4.5–11.5)

## 2022-12-02 PROCEDURE — 6370000000 HC RX 637 (ALT 250 FOR IP): Performed by: PHYSICAL THERAPY ASSISTANT

## 2022-12-02 PROCEDURE — 1200000000 HC SEMI PRIVATE

## 2022-12-02 PROCEDURE — 86334 IMMUNOFIX E-PHORESIS SERUM: CPT

## 2022-12-02 PROCEDURE — 2580000003 HC RX 258: Performed by: FAMILY MEDICINE

## 2022-12-02 PROCEDURE — 83516 IMMUNOASSAY NONANTIBODY: CPT

## 2022-12-02 PROCEDURE — 6360000002 HC RX W HCPCS: Performed by: PHYSICAL THERAPY ASSISTANT

## 2022-12-02 PROCEDURE — 71045 X-RAY EXAM CHEST 1 VIEW: CPT

## 2022-12-02 PROCEDURE — 2580000003 HC RX 258: Performed by: INTERNAL MEDICINE

## 2022-12-02 PROCEDURE — 97530 THERAPEUTIC ACTIVITIES: CPT

## 2022-12-02 PROCEDURE — 80048 BASIC METABOLIC PNL TOTAL CA: CPT

## 2022-12-02 PROCEDURE — 2700000000 HC OXYGEN THERAPY PER DAY

## 2022-12-02 PROCEDURE — 6370000000 HC RX 637 (ALT 250 FOR IP)

## 2022-12-02 PROCEDURE — 76770 US EXAM ABDO BACK WALL COMP: CPT

## 2022-12-02 PROCEDURE — 85025 COMPLETE CBC W/AUTO DIFF WBC: CPT

## 2022-12-02 PROCEDURE — 6360000002 HC RX W HCPCS

## 2022-12-02 PROCEDURE — 84165 PROTEIN E-PHORESIS SERUM: CPT

## 2022-12-02 PROCEDURE — 51798 US URINE CAPACITY MEASURE: CPT

## 2022-12-02 PROCEDURE — 2580000003 HC RX 258: Performed by: PHYSICAL THERAPY ASSISTANT

## 2022-12-02 PROCEDURE — 36415 COLL VENOUS BLD VENIPUNCTURE: CPT

## 2022-12-02 PROCEDURE — 92610 EVALUATE SWALLOWING FUNCTION: CPT

## 2022-12-02 RX ORDER — DEXTROSE AND SODIUM CHLORIDE 5; .45 G/100ML; G/100ML
INJECTION, SOLUTION INTRAVENOUS CONTINUOUS
Status: DISCONTINUED | OUTPATIENT
Start: 2022-12-02 | End: 2022-12-03

## 2022-12-02 RX ORDER — DEXTROSE AND SODIUM CHLORIDE 5; .45 G/100ML; G/100ML
INJECTION, SOLUTION INTRAVENOUS CONTINUOUS
Status: DISCONTINUED | OUTPATIENT
Start: 2022-12-02 | End: 2022-12-02

## 2022-12-02 RX ORDER — SODIUM CHLORIDE 9 MG/ML
INJECTION, SOLUTION INTRAVENOUS CONTINUOUS
Status: DISCONTINUED | OUTPATIENT
Start: 2022-12-02 | End: 2022-12-02

## 2022-12-02 RX ADMIN — VENLAFAXINE 300 MG: 75 TABLET ORAL at 10:00

## 2022-12-02 RX ADMIN — DEXTROSE AND SODIUM CHLORIDE: 5; 450 INJECTION, SOLUTION INTRAVENOUS at 15:18

## 2022-12-02 RX ADMIN — METHOCARBAMOL 750 MG: 750 TABLET ORAL at 22:23

## 2022-12-02 RX ADMIN — DEXTROSE AND SODIUM CHLORIDE: 5; 450 INJECTION, SOLUTION INTRAVENOUS at 12:21

## 2022-12-02 RX ADMIN — GABAPENTIN 100 MG: 100 CAPSULE ORAL at 10:00

## 2022-12-02 RX ADMIN — MORPHINE SULFATE 2 MG: 2 INJECTION, SOLUTION INTRAMUSCULAR; INTRAVENOUS at 22:29

## 2022-12-02 RX ADMIN — ENOXAPARIN SODIUM 30 MG: 100 INJECTION SUBCUTANEOUS at 10:30

## 2022-12-02 RX ADMIN — TROSPIUM CHLORIDE 20 MG: 20 TABLET, FILM COATED ORAL at 22:23

## 2022-12-02 RX ADMIN — POTASSIUM CHLORIDE 20 MEQ: 20 TABLET, EXTENDED RELEASE ORAL at 10:01

## 2022-12-02 RX ADMIN — LAMOTRIGINE 200 MG: 100 TABLET ORAL at 09:59

## 2022-12-02 RX ADMIN — METHOCARBAMOL 750 MG: 750 TABLET ORAL at 10:07

## 2022-12-02 RX ADMIN — PANTOPRAZOLE SODIUM 40 MG: 40 TABLET, DELAYED RELEASE ORAL at 06:36

## 2022-12-02 RX ADMIN — DEXTROSE AND SODIUM CHLORIDE: 5; 450 INJECTION, SOLUTION INTRAVENOUS at 22:22

## 2022-12-02 RX ADMIN — SODIUM CHLORIDE, PRESERVATIVE FREE 10 ML: 5 INJECTION INTRAVENOUS at 10:05

## 2022-12-02 RX ADMIN — MORPHINE SULFATE 2 MG: 2 INJECTION, SOLUTION INTRAMUSCULAR; INTRAVENOUS at 10:14

## 2022-12-02 RX ADMIN — OXYCODONE HYDROCHLORIDE 10 MG: 10 TABLET ORAL at 06:35

## 2022-12-02 RX ADMIN — GABAPENTIN 100 MG: 100 CAPSULE ORAL at 22:23

## 2022-12-02 ASSESSMENT — PAIN SCALES - WONG BAKER
WONGBAKER_NUMERICALRESPONSE: 4
WONGBAKER_NUMERICALRESPONSE: 8
WONGBAKER_NUMERICALRESPONSE: 2

## 2022-12-02 ASSESSMENT — PAIN DESCRIPTION - ORIENTATION
ORIENTATION: RIGHT

## 2022-12-02 ASSESSMENT — PAIN - FUNCTIONAL ASSESSMENT: PAIN_FUNCTIONAL_ASSESSMENT: PREVENTS OR INTERFERES SOME ACTIVE ACTIVITIES AND ADLS

## 2022-12-02 ASSESSMENT — PAIN DESCRIPTION - DESCRIPTORS
DESCRIPTORS: ACHING;DISCOMFORT;SORE
DESCRIPTORS: DISCOMFORT;THROBBING
DESCRIPTORS: ACHING;DISCOMFORT;SORE

## 2022-12-02 ASSESSMENT — PAIN DESCRIPTION - PAIN TYPE
TYPE: SURGICAL PAIN
TYPE: SURGICAL PAIN

## 2022-12-02 ASSESSMENT — PAIN DESCRIPTION - ONSET
ONSET: ON-GOING
ONSET: GRADUAL

## 2022-12-02 ASSESSMENT — PAIN DESCRIPTION - LOCATION
LOCATION: LEG
LOCATION: LEG
LOCATION: HIP

## 2022-12-02 ASSESSMENT — PAIN SCALES - GENERAL
PAINLEVEL_OUTOF10: 7
PAINLEVEL_OUTOF10: 7

## 2022-12-02 ASSESSMENT — PAIN DESCRIPTION - FREQUENCY
FREQUENCY: INTERMITTENT
FREQUENCY: INTERMITTENT

## 2022-12-02 NOTE — PROGRESS NOTES
Return message from Dr. Tung Vergara re: patient's bladder scan:   Recheck bladder scan and 9 pm and text us If no void by that time will place a parham in . Recheck bladder scan and 9 pm and text us If no void by that time will place a parham in .

## 2022-12-02 NOTE — PROGRESS NOTES
SPEECH/LANGUAGE PATHOLOGY  CLINICAL ASSESSMENT OF SWALLOWING FUNCTION   and PLAN OF CARE    PATIENT NAME:  Adrián Tran  (male)     MRN:  [id number]    :  1945  (76 y.o.)  STATUS:  Inpatient: Room 5424/5424-A    TODAY'S DATE:  2022  REFERRING PROVIDER:     SPECIFIC PROVIDER ORDER: SLP swallowing-dysphagia evaluation and treatment Date of order: 22  Fozia Gonzalez MD   REASON FOR REFERRAL: Dysphagiua   EVALUATING THERAPIST: WILLIAM Mejía                 RESULTS:    DYSPHAGIA DIAGNOSIS:   Clinical indicators of mild-moderate oropharyngeal phase dysphagia       DIET RECOMMENDATIONS:  Minced and moist consistency solids (IDDSI level 5) with  nectar consistency (mildly thick - IDDSI level 2) liquids     FEEDING RECOMMENDATIONS:     Assistance level:  Full assistance is needed during all oral intake      Compensatory strategies recommended: Small bites/sips and Alternate solids and liquids      Discussed recommendations with nursing and/or faxed report to referring provider: Yes    SPEECH THERAPY  PLAN OF CARE   The dysphagia POC is established based on physician order, dysphagia diagnosis and results of clinical assessment     Skilled SLP intervention for dysphagia management up to 5x per week until goals met, pt plateaus in function and/or discharged from hospital    Conditions Requiring Skilled Therapeutic Intervention for dysphagia:    Patient is performing below functional baseline d/t  current acute condition, Multiple diagnoses, multiple medications, and increased dependency upon caregivers.     Specific dysphagia interventions to include:     Compensatory strategy training   Therapeutic exercises  Trials of upgraded diet/liquid     Specific instructions for next treatment:  development and training of compensatory swallow strategies to improve airway protection and swallow function, therapeutic po trial to determine safety of advanced diet textures and consistencies, and ongoing PO analysis to upgrade diet and evaluate tolerance of current PO recommendation  Patient Treatment Goals:    Short Term Goals:  Pt will implement identified compensatory swallowing strategies on 90% of opportunities or greater to improve airway protection and swallow function. Pt will participate in ongoing mealtime assessment to provide diet modification and compensatory strategy implementation to minimize risk of aspiration associated with PO intake  Pt will complete PO trials of upgraded diet textures with SLP only to determine the least restrictive PO diet to maintain adequate nutrition/hydration with no more than 1 overt s/s of pen/asp. Long Term Goals:   Pt will improve oropharyngeal swallow function to ensure airway protection during PO intake to maintain adequate nutrition/hydration and decrease signs/symptoms of aspiration to less than 1 x/day. Patient/family Goal:    Did not state. Will further assess during treatment. Plan of care discussed with Patient and Family   The Family understand(s) the diagnosis, prognosis and plan of care     Rehabilitation Potential/Prognosis: good                    ADMITTING DIAGNOSIS: Other fracture of right femur, initial encounter for closed fracture ([facility] [address] [S72.8X1A]  Fall from standing, initial encounter [W19. XXXA]  Supracondyl fx femur-closed, right, initial encounter [facility]) [facility]    VISIT DIAGNOSIS:   Visit Diagnoses         Codes    Supracondyl fx femur-closed, right, initial encounter [facility])    -  Primary S72.451A    Fall from standing, initial encounter     [address] XXXA             PATIENT REPORT/COMPLAINT: patient not able to accurately report  meal tray present during evaluation     PRIOR LEVEL OF SWALLOW FUNCTION:    PAST HISTORY OF DYSPHAGIA?: none reported    Home diet: Regular consistency solids (IDDSI level 7) with  thin liquids (IDDSI level 0)  Current Diet Order:  ADULT DIET;  Regular  ADULT ORAL NUTRITION SUPPLEMENT; Breakfast, Lunch, Dinner; Diabetic Oral Supplement  ADULT ORAL NUTRITION SUPPLEMENT; Breakfast, Dinner; Wound Healing Oral Supplement    PROCEDURE:  Consistencies Administered During the Evaluation   Liquids: thin liquid and nectar thick liquid   Solids:  pureed foods, soft solid foods, and solid foods      Method of Intake:   cup, straw, spoon  Fed by clinician      Position:   Seated, upright    CLINICAL ASSESSMENT:  Oral Stage:       Xerostomia  Impaired oral initiation  Decreased mastication due to:  cognitive function  Delayed A-P transit due to: decreased ability for initiation    and cognitive function       Pharyngeal Stage:    Latent wet cough was noted after presentation of thin liquid    Cognition:   Confusion noted, Attention impaired, , Insight to current deficits impaired,, and Needs frequent verbal cues to maintain adequate alertness    Oral Peripheral Examination   Generalized oral weakness    Current Respiratory Status    1 liters nasal cannula     Parameters of Speech Production  Respiration:  Adequate for speech production  Quality:   Within functional limits  Intensity: Could not test    Volitional Swallow: present     Volitional Cough:   present     Pain: No pain reported. EDUCATION:   The Speech Language Pathologist (SLP) completed education regarding results of evaluation and that intervention is warranted at this time. Learner: Patient and Family  Education: Reviewed results and recommendations of this evaluation, Reviewed diet and strategies, and Demonstrated how to thicken liquids appropriately  Evaluation of Education:  Verbalizes understanding    This plan may be re-evaluated and revised as warranted. Evaluation Time includes thorough review of current medical information, gathering information on past medical history/social history and prior level of function, completion of standardized testing/informal observation of tasks, assessment of data and education on plan of care and goals.     [x]The admitting diagnosis and active problem list, have been reviewed prior to initiation of this evaluation.         ACTIVE PROBLEM LIST:   Patient Active Problem List   Diagnosis    Other fracture of right femur, initial encounter for closed fracture ([facility] [address]    Supracondyl fx femur-closed, right, initial encounter [facility])         CPT code:  12097  bedside swallow mirella Smith, ODETTE, CCC-SLP/L   Speech Language Pathologist  [id number]

## 2022-12-02 NOTE — PROGRESS NOTES
Physical Therapy  Rx    Name: Navneet Miranda  : 1945  MRN: [id number]      Date of Service:  22    Evaluating PT:  Roscoe Meredith, PT, DPT [id number]    Room #:  0317/9454-S  Attempted to see pt in later afternoon. Pt sleeping heavily. Per nursing pt medicated and was agitated  and that pt needs some sleep. Held off on rx . Will check on tomorrow.         Burak Kirk, [address]

## 2022-12-02 NOTE — PROGRESS NOTES
Occupational Therapy  OT BEDSIDE TREATMENT NOTE   [facility] [address]  [address] [place] [address]      [id number]  Patient Name: Ava Shields  MRN: [id number]  : 1945  Room: [facility]/2764-D     Evaluating OT:Antonina Alvarez, OTR/L   License #  [id number]        Referring Provider: Daniel Mccullough DO    Specific Provider Orders/Date: OT evaluation & treatment        Diagnosis:  RIGHT DISTAL FEMUR FRACTURE    Pertinent Medical History:  has no past medical history on file. Surgery: 22: RIGHT PERIPROSTHETIC DISTAL FEMUR OPEN REDUCTION INTERNAL FIXATION       Past Surgical History:  has no past surgical history on file. Precautions:  Fall Risk, NWB R LE, R KI with transition to Hinged brace set 0-30, bed alarm, cognition      Assessment of current deficits    [x] Functional mobility            [x]ADLs           [x] Strength                  [x]Cognition    [x] Functional transfers          [x] IADLs         [x] Safety Awareness   [x]Endurance    [x] Fine Coordination                         [x] Balance      [] Vision/perception   [x]Sensation      []Gross Motor Coordination             [] ROM           [] Delirium                   [] Motor Control      OT PLAN OF CARE   OT POC based on physician orders, patient diagnosis and results of clinical assessment     Frequency/Duration: 2-4 days/wk for 2 weeks PRN   Specific OT Treatment Interventions to include:    Instruction/training on adapted ADL techniques and AE recommendations to increase functional independence within precautions  Training on energy conservation strategies, correct breathing pattern and techniques to improve independence/tolerance for self-care routine  Functional transfer/mobility training/DME recommendations for increased independence, safety, and fall prevention  Patient/Family education to increase follow through with safety techniques and functional independence  Recommendation of environmental modifications for increased safety with functional transfers/mobility and ADLs  Cognitive retraining/development of therapeutic activities to improve problem solving, judgement, memory, and attention for increased safety/participation in ADL/IADL tasks  Splinting/positioning for increased function, prevention of contractures, and improve skin integrity  Therapeutic exercise to improve motor endurance, ROM, and functional strength for ADLs/functional transfers  Therapeutic activities to facilitate/challenge dynamic balance, stand tolerance for increased safety and independence with ADLs  Therapeutic activities to facilitate gross/fine motor skills for increased independence with ADLs  Positioning to improve skin integrity, interaction with environment and functional independence     Recommended Adaptive Equipment:  TBD      Home Living: Pt lives with wife in a ?? story with 14?? steps to enter with B HR. B&B on ?? level. Bathroom setup: ?? Equipment owned: piter, gio per chart     Prior Level of Function: ?? with ADLs , ?? with IADLs; ambulated ? ? Driving: ?? Occupation: retired  Comments: Pt. Is questionable historian     Pain Level: min R LE with movement  Cognition: A&O: 1/4 (self); pt too lethargic this date to assess              Memory:  P              Sequencing:  P              Problem solving:  P              Judgement/safety:  P                Functional Assessment:  AM-PAC Daily Activity Raw Score: 8/24    Initial Eval Status  Date: 11-26-22 Treatment Status  Date: 12/2/22 STGs = LTGs  Time frame: 10-14 days   Feeding Max A with cup to mouth Max A  Set up   Grooming Dep Dep  To wash face seated EOB  Set up   UB Dressing Dep  dep  To don/doff gown seated EOB SBA    LB Dressing Dep  socks dependent   To don/doff socks seated EOB Min A   Bathing Dep with sim.  task Max A  Per last tx  Min A   Toileting Dep  dep- hygiene   Min A   Bed Mobility  Logroll: Dep x2  Supine to sit: Dep x2 with attempt, unable to achieve  Sit to supine: Dep x2 with attempt, unable to achieve  dep - supine<->sit      Supine to sit: Min A  Sit to supine: Min A   Functional Transfers NT  Pt. deferred Unable to stand this date with Max A x2  Mod A   Functional Mobility NT  Pt. deferred N/T  Mod A   Balance Sitting:     Static:  NT    Dynamic:NT  Standing: NT Sitting:     Static:  Max A     Dynamic: Max A  Standing: N/T     Activity Tolerance P P F   Visual/  Perceptual WFL          Vitals spO2 95% on 2L via NC at rest.  HR: 70 bpm   WFL       Comments: Upon arrival pt supine in bed. Pt educated on techniques to increase independence and safety during ADL's and bed mobility. At end of session pt left seated semi upright in bed, call light within reach, alarm set. Pt has made limited progress towards set goals.      Continue with current plan of care    Treatment Time In: 2:45            Treatment Time Out: 3:00             Treatment Charges: Mins Units   Ther Ex  [id number]     Manual Therapy 33857     Thera Activities 35221 15 1   ADL/Home Mgt [id number]     Neuro Re-ed 31399     Group Therapy      Orthotic manage/training  [id number]     Non-Billable Time     Total Timed Treatment 15 69 Dennise Mckeon

## 2022-12-02 NOTE — PROGRESS NOTES
Hospitalist Progress Note      SYNOPSIS: Patient admitted on 2022 for Other fracture of right femur, initial encounter for closed fracture ([facility] [address]      SUBJECTIVE:    Patient seen and examined. Answers questions and more coherent today. Daughter at bedside. All questions and concerns addressed. Records reviewed. Patient seen and examined. Patient is confused. Daughter and wife at bedside mention that when he had surgery 2 years ago he had similar symptoms with confusion for 7 days and were told it was likely from anesthesia from surgery. Due to this less anesthesia was recommended this surgery and spinal block. Discussed concern for possible UTI and also [place]. Discussed IV hydration and also obtain urine for urine analysis and culture. Records reviewed. Patient is a 76 y.o. male with a past medical history of hypertension, depression, osteoporosis who presents with the chief complaint of right knee pain following a fall from standing height. Patient states that he was taking a step up from his living room to his kitchen and as he went to take a step he felt his knee buckle fell to the ground. Denies any his head denies loss of consciousness. He states he had immediate onset pain to the right knee and subsequently sought medical treatment at [facility]. Was found to have a periprosthetic distal femur fracture. Transferred to [facility] for definitive care and treatment. Status post distal femur ORIF on 2022. Given altered mental status urinalysis and urine culture has been ordered to rule out UTI. Urine culture with E.coli. Stable overnight. No other overnight issues reported. Temp (24hrs), Av.8 °F (36.6 °C), Min:96.7 °F (35.9 °C), Max:98.4 °F (36.9 °C)    DIET: ADULT ORAL NUTRITION SUPPLEMENT; Breakfast, Lunch, Dinner; Diabetic Oral Supplement  ADULT ORAL NUTRITION SUPPLEMENT; Breakfast, Dinner;  Wound Healing Oral mL/hr at 12/02/22 1221    sodium chloride       Scheduled Medications    levofloxacin  500 mg IntraVENous Q48H    gabapentin  100 mg Oral TID    enoxaparin  30 mg SubCUTAneous Daily    methocarbamol  750 mg Oral 4x Daily    [Held by provider] carvedilol  12.5 mg Oral Daily    [Held by provider] furosemide  20 mg Oral Daily    lamoTRIgine  200 mg Oral Daily    [Held by provider] lisinopril  5 mg Oral Daily    pantoprazole  40 mg Oral QAM AC    venlafaxine  300 mg Oral Daily    trospium  20 mg Oral Nightly    sodium chloride flush  10 mL IntraVENous 2 times per day    potassium chloride  20 mEq Oral BID      PRN Meds: hydrALAZINE, trimethobenzamide, benzocaine-menthol, morphine **OR** morphine, oxyCODONE **OR** oxyCODONE, sodium chloride flush, sodium chloride, polyethylene glycol, acetaminophen **OR** acetaminophen    Labs:     Recent Labs     12/01/22  1526 12/02/22  0526   WBC 14.4* 10.8   HGB 10.9* 10.5*   HCT 33.4* 34.1*    186       Recent Labs     12/01/22  1526 12/02/22  0526   * 152*   K 4.2 4.1   * 122*   CO2 16* 17*   BUN 51* 56*   CREATININE 3.7* 3.9*   CALCIUM 9.1 9.4       No results for input(s): PROT, ALB, ALKPHOS, ALT, AST, BILITOT, AMYLASE, LIPASE in the last 72 hours. No results for input(s): INR in the last 72 hours. Recent Labs     12/01/22  1526   CKTOTAL 117       Chronic labs:    Lab Results   Component Value Date    INR 1.2 11/24/2022       Radiology: REVIEWED DAILY    +++++++++++++++++++++++++++++++++++++++++++++++++  Catherine Coto MD  [facility] Physician - [address] [place] [address]  +++++++++++++++++++++++++++++++++++++++++++++++++  NOTE: This report was transcribed using voice recognition software. Every effort was made to ensure accuracy; however, inadvertent computerized transcription errors may be present.

## 2022-12-02 NOTE — PROGRESS NOTES
Patient bladder scanned for 390 ml. Patient is incontinent and last voided / changed around 1230. Message sent to Dr. Dante Melendrez,  awaiting response.

## 2022-12-02 NOTE — PROGRESS NOTES
[facility] MD Rohith Oliver Chi, MD ALI Lacey Millard MD .  Consultation  Patient's Name: Vikki Shearer  11:04 AM  12/2/2022    Seen in his room confused (what is his baseline mental status )   RN is telling he aspirated this am and he is now NPO   Per RN no diarrhea   Clinicaly not doing well     History of Present Ilness:      77 y/o M presenting with R knee pain   Pt has hx of HTN OA CKD   Pt is being admitted with diangosis of periprosthetic distal femur fracture   Pt seen in his room he is a poor historian , he is on RA he appear euvolemic   Pt presented with cr of 2 . Cr up to 3.2   When asking pt does he have kidney disease he ll I think so . When asking him do you follow with a kidney speciliast his response (on multiple tries ) was I do no think so . History reviewed. No pertinent past medical history. Past Surgical History:   Procedure Laterality Date    FEMUR FRACTURE SURGERY Right 11/25/2022    RIGHT PERIPROSTHETIC DISTAL FEMUR OPEN REDUCTION INTERNAL FIXATION performed by Alexander Palmer DO at [facility]       No family history on file. reports that he has an unknown smoking status.  He has never used smokeless tobacco.    Allergies:  Ceftriaxone    Current Medications:    dextrose 5 % and 0.45 % sodium chloride infusion, Continuous  hydrALAZINE (APRESOLINE) injection 5 mg, Q6H PRN  levoFLOXacin (LEVAQUIN) 500 MG/100ML infusion 500 mg, Q48H  trimethobenzamide (TIGAN) injection 200 mg, Q6H PRN  gabapentin (NEURONTIN) capsule 100 mg, TID  enoxaparin Sodium (LOVENOX) injection 30 mg, Daily  methocarbamol (ROBAXIN) tablet 750 mg, 4x Daily  benzocaine-menthol (CEPACOL SORE THROAT) lozenge 1 lozenge, Q2H PRN  morphine (PF) injection 2 mg, Q2H PRN   Or  morphine sulfate (PF) injection 4 mg, Q2H PRN  oxyCODONE (ROXICODONE) immediate release tablet 5 mg, Q4H PRN   Or  oxyCODONE HCl (OXY-IR) immediate release tablet 10 mg, Q4H PRN  [Held by provider] carvedilol (COREG) tablet 12.5 mg, Daily  [Held by provider] furosemide (LASIX) tablet 20 mg, Daily  lamoTRIgine (LAMICTAL) tablet 200 mg, Daily  [Held by provider] lisinopril (PRINIVIL;ZESTRIL) tablet 5 mg, Daily  pantoprazole (PROTONIX) tablet 40 mg, QAM AC  venlafaxine (EFFEXOR) tablet 300 mg, Daily  trospium (SANCTURA) tablet 20 mg, Nightly  sodium chloride flush 0.9 % injection 10 mL, 2 times per day  sodium chloride flush 0.9 % injection 10 mL, PRN  0.9 % sodium chloride infusion, PRN  polyethylene glycol (GLYCOLAX) packet 17 g, Daily PRN  acetaminophen (TYLENOL) tablet 650 mg, Q6H PRN   Or  acetaminophen (TYLENOL) suppository 650 mg, Q6H PRN  potassium chloride (KLOR-CON M) extended release tablet 20 mEq, BID WC      Review of Systems:   Constitutional: no fevers , no chills , feels ok   Eyes: no eye pain , no itching , no drainage  Ears, nose, mouth, throat, and face: no ear ,nose pain , hearing is ok ,no nasal drainage   Respiratory: no sob ,no cough ,no wheezing . Cardiovascular: no chest pain , no palpitation ,no sob . Gastrointestinal: no nausea, vomiting , constipation , no abdominal pain . Genitourinary:no urinary retention , no burning , dysuria . No polyuria   Hematologic/lymphatic: no bleeding , no cougulation issues . Musculoskeletal:no joint pain , no swelling . Neurological: no headaches ,no weakness , no numbness . Endocrine: no thirst , no weight issues . Physical exam:   Vital signs BP (!) 140/67   Pulse 89   Temp 98.3 °F (36.8 °C) (Temporal)   Resp 20   Ht 6' 2\" (1.88 m)   Wt 212 lb (96.2 kg)   SpO2 92%   BMI 27.22 kg/m²   Gen : NAD , appropriate for stated age . Head : at , nc   Neck : supple , no thyromegaly noted . Eyes : EOMI , PERRLA   CV : RRR , No M/R/G . Lungs: CTAB , no wheezing , good flow heard b/l   Abd : soft , NT , BS + , No Organomegaly appreciated . Skin : soft, dry . Neuro : CN  II-XII grossly intact , no focal neurologic deficit .    Psych : cooperative .     Data:   Labs:  CBC with Differential:    Lab Results   Component Value Date/Time    WBC 10.8 12/02/2022 05:26 AM    RBC 3.78 12/02/2022 05:26 AM    HGB 10.5 12/02/2022 05:26 AM    HCT 34.1 12/02/2022 05:26 AM     12/02/2022 05:26 AM    MCV 90.2 12/02/2022 05:26 AM    MCH 27.8 12/02/2022 05:26 AM    MCHC 30.8 12/02/2022 05:26 AM    RDW 19.4 12/02/2022 05:26 AM    LYMPHOPCT 7.6 12/02/2022 05:26 AM    MONOPCT 7.5 12/02/2022 05:26 AM    BASOPCT 0.6 12/02/2022 05:26 AM    MONOSABS 0.81 12/02/2022 05:26 AM    LYMPHSABS 0.82 12/02/2022 05:26 AM    EOSABS 0.12 12/02/2022 05:26 AM    BASOSABS 0.07 12/02/2022 05:26 AM     CMP:    Lab Results   Component Value Date/Time     12/02/2022 05:26 AM    K 4.1 12/02/2022 05:26 AM     12/02/2022 05:26 AM    CO2 17 12/02/2022 05:26 AM    BUN 56 12/02/2022 05:26 AM    CREATININE 3.9 12/02/2022 05:26 AM    LABGLOM 15 12/02/2022 05:26 AM    GLUCOSE 210 12/02/2022 05:26 AM    PROT 6.5 11/24/2022 05:28 AM    LABALBU 3.4 11/24/2022 05:28 AM    CALCIUM 9.4 12/02/2022 05:26 AM    BILITOT 0.5 11/24/2022 05:28 AM    ALKPHOS 100 11/24/2022 05:28 AM    AST 19 11/24/2022 05:28 AM    ALT 19 11/24/2022 05:28 AM     Ionized Calcium:  No results found for: IONCA  Magnesium:    Lab Results   Component Value Date/Time    MG 2.3 11/24/2022 05:28 AM     Phosphorus:  No results found for: PHOS  U/A:    Lab Results   Component Value Date/Time    COLORU Yellow 11/30/2022 11:59 AM    PHUR 6.0 11/30/2022 11:59 AM    WBCUA 10-20 11/30/2022 11:59 AM    RBCUA 10-20 11/30/2022 11:59 AM    BACTERIA MODERATE 11/30/2022 11:59 AM    CLARITYU Clear 11/30/2022 11:59 AM    SPECGRAV 1.025 11/30/2022 11:59 AM    LEUKOCYTESUR MODERATE 11/30/2022 11:59 AM    UROBILINOGEN 0.2 11/30/2022 11:59 AM    BILIRUBINUR Negative 11/30/2022 11:59 AM    BLOODU LARGE 11/30/2022 11:59 AM    GLUCOSEU Negative 11/30/2022 11:59 AM     Microalbumen/Creatinine ratio:  No components found for: RUCREAT  Iron Saturation:  No components found for: PERCENTFE  TIBC:  No results found for: TIBC  FERRITIN:  No results found for: FERRITIN     Imaging:  XR FEMUR RIGHT (MIN 2 VIEWS)   Final Result   Status post right femoral ORIF with improved anatomic alignment spanning the   right distal femoral fracture with expected postsurgical changes in the soft   tissues         FLUORO FOR SURGICAL PROCEDURES   Final Result   Intraprocedural fluoroscopic spot images as above. See separate procedure   report for more information. XR CHEST 1 VIEW   Final Result   No acute cardiopulmonary process. CT FEMUR RIGHT WO CONTRAST   Final Result   1. Supracondylar region femoral fracture with displacement, immediately   cephalad to the knee replacement. 2. Further details and incidental findings are described above. XR KNEE RIGHT (1-2 VIEWS)   Final Result   Angulated and impacted supracondylar fracture. XR FEMUR RIGHT (MIN 2 VIEWS)   Final Result   Angulated and impacted supracondylar fracture. US RETROPERITONEAL COMPLETE    (Results Pending)   XR CHEST PORTABLE    (Results Pending)       Assessment    -Acute kidney injury     -chronic kidney disease stage III (baseline cr unknown )     -Femur neck fracture     -Hypertension     -UTI     -Microscopic hematuria     Plan :     Cr creeping up   Prot/cr 3.5   What is his baseline mental status ( he is way deviated )   Aspiration episode this am   Is he diabetics    -change iv fluids to d5w -1/2 NS   -continue to hold lasix hold lisinopril   -cxr  -swallow evaluation   -US of the kidney   -check serologies (anca) (upep spep)  -treat UTI . If hematuria persist post treating UTI then will need urology evaluation .    -bmp later on today  -need tele bed         Electronically signed by Michael Sandhu MD on 12/2/2022 at 11:04 AM

## 2022-12-02 NOTE — PROGRESS NOTES
Physical Therapy  Rx    Name: Yasmeen Fox  : 1945  MRN: [id number]      Date of Service: 2022    Evaluating PT:  Jeramie Robertson, PT, DPT [id number]    Room #:  5707/7235-F  Diagnosis:  Other fracture of right femur, initial encounter for closed fracture [facility]) [place]  Fall from standing, initial encounter [W19. XXXA]  Supracondyl fx femur-closed, right, initial encounter ([address] [place]  PMHx/PSHx:  None documented  Procedure/Surgery:  RIGHT PERIPROSTHETIC DISTAL FEMUR OPEN REDUCTION INTERNAL FIXATION (2022)  Precautions:  Falls, cognition, NWB RLE, (patient to receive R hinged knee brace set at 0-30 degrees), , parham catheter  Equipment Needs:  TBD  Equipment Owned:  Walker    SUBJECTIVE:    Pt is questionable historian. Per chart, patient lives with spouse and used a walker PTA. Pt reports that there are 14 stair(s) with 2 rails to enter home. OBJECTIVE:   Initial Evaluation  Date: 2022 Treatment  2022 Short Term/ Long Term   Goals   AM-PAC 6 Clicks     Was pt agreeable to Eval/treatment? Yes Yes    Does pt have pain? Severe RLE R LE       Bed Mobility  Rolling: Dependent (partial rolling performed)  Supine to sit: NT (attempted; unable to perform)  Sit to supine: NT  Scooting: Dependent x2 Rolling: NT  Supine to sit: dependent 3   Sit to supine: dependeent   Scooting: dependent  Rolling: SBA  Supine to sit: [place]  Sit to supine: [place]  Scooting: SBA   Transfers Sit to stand: NT  Stand to sit: NT  Stand pivot: NT Sit to stand: Not able to stand     Stand pivot: NT unsafe this date.   Sit to stand: [address] [facility] [place]  Stand to sit: ModA  Stand pivot: ModA with AAD   Ambulation   NT NT 3 feet (bed <> chair) with AAD ModA   Stair negotiation: ascended and descended  NT NT TBD   ROM BUE:  See OT note  LLE:  WFL     Strength BUE:  See OT note  LLE:  Grossly 5/5     Balance Sitting EOB:  NT  Dynamic Standing:  NT Sitting EOB:  max/dep   Dynamic Standing: nt  Static stand nt   Sitting EOB: SBA  Dynamic Standing:  ModA with AAD       Pt is A & O x name  Edema:  nt      Therapeutic Exercises:     prom right le  small abd, slr  within available rom    Patient education  Pt educated on date, time, orientation     Patient response to education:   Pt verbalized understanding Pt demonstrated skill Pt requires further education in this area   nt nt xx     ASSESSMENT:    Comments:  Pt resting would open eyes occassional and he was aware he was in [place], [place]. Sat pt up on eob dep of 2  to 3. Pt did open eyes occasionally with verbal commands but overall pt not alert. Pt not participating or responding by more alertness. Pt sitting with head down would not lift his head required assist.  Pt returned to bed linen pads changed and pt off loaded to his right side as he was found. 3 rails up and bed alarm on. Treatment:  Patient dependent   Pt education - orientation        PLAN:    Patient is making poor progress towards established goals. Will continue with current POC.       Time in  1445   Time out  1500   Total Treatment Time  15   minutes       CPT codes:  x Therapeutic activities 65237 15 minutes  [] Therapeutic exercises 46860 nt minutes      Kayleigh Neely, [address]

## 2022-12-02 NOTE — FLOWSHEET NOTE
Dr. Sixto Sow that pt pocketing food and started chocking when he did swallow some.  Also, notified of audible wheezing

## 2022-12-03 ENCOUNTER — APPOINTMENT (OUTPATIENT)
Dept: GENERAL RADIOLOGY | Age: 77
DRG: 480 | End: 2022-12-03
Payer: MEDICARE

## 2022-12-03 ENCOUNTER — APPOINTMENT (OUTPATIENT)
Dept: CT IMAGING | Age: 77
DRG: 480 | End: 2022-12-03
Payer: MEDICARE

## 2022-12-03 LAB
ALBUMIN SERPL-MCNC: 2.2 G/DL (ref 3.5–5.2)
ALBUMIN SERPL-MCNC: 2.2 G/DL (ref 3.5–5.2)
ALP BLD-CCNC: 152 U/L (ref 40–129)
ALP BLD-CCNC: 152 U/L (ref 40–129)
ALT SERPL-CCNC: 76 U/L (ref 0–40)
ALT SERPL-CCNC: 77 U/L (ref 0–40)
AMMONIA: 34 UMOL/L (ref 16–60)
ANION GAP SERPL CALCULATED.3IONS-SCNC: 12 MMOL/L (ref 7–16)
ANION GAP SERPL CALCULATED.3IONS-SCNC: 13 MMOL/L (ref 7–16)
ANION GAP SERPL CALCULATED.3IONS-SCNC: 14 MMOL/L (ref 7–16)
ANION GAP SERPL CALCULATED.3IONS-SCNC: 15 MMOL/L (ref 7–16)
ANISOCYTOSIS: ABNORMAL
AST SERPL-CCNC: 211 U/L (ref 0–39)
AST SERPL-CCNC: 217 U/L (ref 0–39)
B.E.: -7.9 MMOL/L (ref -3–3)
BACTERIA: ABNORMAL /HPF
BASOPHILS ABSOLUTE: 0 E9/L (ref 0–0.2)
BASOPHILS RELATIVE PERCENT: 0.4 % (ref 0–2)
BASOPHILS RELATIVE PERCENT: 0.4 % (ref 0–2)
BASOPHILS RELATIVE PERCENT: 0.6 % (ref 0–2)
BILIRUB SERPL-MCNC: 0.7 MG/DL (ref 0–1.2)
BILIRUB SERPL-MCNC: 0.7 MG/DL (ref 0–1.2)
BILIRUBIN URINE: ABNORMAL
BLOOD, URINE: ABNORMAL
BUN BLDV-MCNC: 67 MG/DL (ref 6–23)
BUN BLDV-MCNC: 68 MG/DL (ref 6–23)
BUN BLDV-MCNC: 69 MG/DL (ref 6–23)
BUN BLDV-MCNC: 69 MG/DL (ref 6–23)
BURR CELLS: ABNORMAL
CALCIUM SERPL-MCNC: 8.8 MG/DL (ref 8.6–10.2)
CALCIUM SERPL-MCNC: 8.8 MG/DL (ref 8.6–10.2)
CALCIUM SERPL-MCNC: 9.2 MG/DL (ref 8.6–10.2)
CALCIUM SERPL-MCNC: 9.3 MG/DL (ref 8.6–10.2)
CALCIUM SERPL-MCNC: 9.5 MG/DL (ref 8.6–10.2)
CHLORIDE BLD-SCNC: 116 MMOL/L (ref 98–107)
CHLORIDE BLD-SCNC: 116 MMOL/L (ref 98–107)
CHLORIDE BLD-SCNC: 117 MMOL/L (ref 98–107)
CHLORIDE BLD-SCNC: 119 MMOL/L (ref 98–107)
CHLORIDE BLD-SCNC: 119 MMOL/L (ref 98–107)
CHLORIDE BLD-SCNC: 120 MMOL/L (ref 98–107)
CHLORIDE BLD-SCNC: 120 MMOL/L (ref 98–107)
CLARITY: CLEAR
CO2: 16 MMOL/L (ref 22–29)
CO2: 17 MMOL/L (ref 22–29)
COHB: 0.5 % (ref 0–1.5)
COLOR: ABNORMAL
CREAT SERPL-MCNC: 4.9 MG/DL (ref 0.7–1.2)
CREAT SERPL-MCNC: 5.2 MG/DL (ref 0.7–1.2)
CREAT SERPL-MCNC: 5.3 MG/DL (ref 0.7–1.2)
CREAT SERPL-MCNC: 5.4 MG/DL (ref 0.7–1.2)
CREAT SERPL-MCNC: 5.4 MG/DL (ref 0.7–1.2)
CREAT SERPL-MCNC: 5.5 MG/DL (ref 0.7–1.2)
CREAT SERPL-MCNC: 5.5 MG/DL (ref 0.7–1.2)
CRITICAL: ABNORMAL
DATE ANALYZED: ABNORMAL
DATE OF COLLECTION: ABNORMAL
EOSINOPHILS ABSOLUTE: 0 E9/L (ref 0.05–0.5)
EOSINOPHILS RELATIVE PERCENT: 0.5 % (ref 0–6)
EOSINOPHILS RELATIVE PERCENT: 0.6 % (ref 0–6)
EOSINOPHILS RELATIVE PERCENT: 0.6 % (ref 0–6)
GFR SERPL CREATININE-BSD FRML MDRD: 10 ML/MIN/1.73
GFR SERPL CREATININE-BSD FRML MDRD: 11 ML/MIN/1.73
GFR SERPL CREATININE-BSD FRML MDRD: 11 ML/MIN/1.73
GLUCOSE BLD-MCNC: 114 MG/DL (ref 74–99)
GLUCOSE BLD-MCNC: 115 MG/DL (ref 74–99)
GLUCOSE BLD-MCNC: 172 MG/DL (ref 74–99)
GLUCOSE BLD-MCNC: 185 MG/DL (ref 74–99)
GLUCOSE BLD-MCNC: 188 MG/DL (ref 74–99)
GLUCOSE BLD-MCNC: 208 MG/DL (ref 74–99)
GLUCOSE BLD-MCNC: 213 MG/DL (ref 74–99)
GLUCOSE URINE: 100 MG/DL
HCO3: 17.3 MMOL/L (ref 22–26)
HCT VFR BLD CALC: 27.4 % (ref 37–54)
HCT VFR BLD CALC: 31.8 % (ref 37–54)
HCT VFR BLD CALC: 31.9 % (ref 37–54)
HEMOGLOBIN: 10 G/DL (ref 12.5–16.5)
HEMOGLOBIN: 8.5 G/DL (ref 12.5–16.5)
HEMOGLOBIN: 9.8 G/DL (ref 12.5–16.5)
HHB: 2.2 % (ref 0–5)
KETONES, URINE: ABNORMAL MG/DL
LAB: ABNORMAL
LACTIC ACID: 1.6 MMOL/L (ref 0.5–2.2)
LEUKOCYTE ESTERASE, URINE: ABNORMAL
LYMPHOCYTES ABSOLUTE: 0.6 E9/L (ref 1.5–4)
LYMPHOCYTES ABSOLUTE: 0.85 E9/L (ref 1.5–4)
LYMPHOCYTES ABSOLUTE: 1.62 E9/L (ref 1.5–4)
LYMPHOCYTES RELATIVE PERCENT: 11.3 % (ref 20–42)
LYMPHOCYTES RELATIVE PERCENT: 4.3 % (ref 20–42)
LYMPHOCYTES RELATIVE PERCENT: 5.2 % (ref 20–42)
Lab: ABNORMAL
MAGNESIUM: 2 MG/DL (ref 1.6–2.6)
MCH RBC QN AUTO: 27.2 PG (ref 26–35)
MCH RBC QN AUTO: 27.7 PG (ref 26–35)
MCH RBC QN AUTO: 27.8 PG (ref 26–35)
MCHC RBC AUTO-ENTMCNC: 30.8 % (ref 32–34.5)
MCHC RBC AUTO-ENTMCNC: 31 % (ref 32–34.5)
MCHC RBC AUTO-ENTMCNC: 31.3 % (ref 32–34.5)
MCV RBC AUTO: 88.3 FL (ref 80–99.9)
MCV RBC AUTO: 88.6 FL (ref 80–99.9)
MCV RBC AUTO: 89.3 FL (ref 80–99.9)
METAMYELOCYTES RELATIVE PERCENT: 0.9 % (ref 0–1)
METER GLUCOSE: 195 MG/DL (ref 74–99)
METHB: 0.4 % (ref 0–1.5)
MODE: ABNORMAL
MONOCYTES ABSOLUTE: 0.6 E9/L (ref 0.1–0.95)
MONOCYTES ABSOLUTE: 0.68 E9/L (ref 0.1–0.95)
MONOCYTES ABSOLUTE: 0.88 E9/L (ref 0.1–0.95)
MONOCYTES RELATIVE PERCENT: 3.5 % (ref 2–12)
MONOCYTES RELATIVE PERCENT: 3.5 % (ref 2–12)
MONOCYTES RELATIVE PERCENT: 6.1 % (ref 2–12)
MYELOCYTE PERCENT: 0.9 % (ref 0–0)
NEUTROPHILS ABSOLUTE: 12.2 E9/L (ref 1.8–7.3)
NEUTROPHILS ABSOLUTE: 13.8 E9/L (ref 1.8–7.3)
NEUTROPHILS ABSOLUTE: 15.47 E9/L (ref 1.8–7.3)
NEUTROPHILS RELATIVE PERCENT: 81.7 % (ref 43–80)
NEUTROPHILS RELATIVE PERCENT: 90.4 % (ref 43–80)
NEUTROPHILS RELATIVE PERCENT: 92.2 % (ref 43–80)
NITRITE, URINE: POSITIVE
NUCLEATED RED BLOOD CELLS: 0.9 /100 WBC
O2 SATURATION: 97.8 % (ref 92–98.5)
O2HB: 96.9 % (ref 94–97)
OPERATOR ID: 2246
OVALOCYTES: ABNORMAL
PATIENT TEMP: 37 C
PCO2: 34.1 MMHG (ref 35–45)
PDW BLD-RTO: 19.7 FL (ref 11.5–15)
PDW BLD-RTO: 20 FL (ref 11.5–15)
PDW BLD-RTO: 20.2 FL (ref 11.5–15)
PH BLOOD GAS: 7.32 (ref 7.35–7.45)
PH UA: 6.5 (ref 5–9)
PHOSPHORUS: 3.5 MG/DL (ref 2.5–4.5)
PLATELET # BLD: 159 E9/L (ref 130–450)
PLATELET # BLD: 180 E9/L (ref 130–450)
PLATELET # BLD: 188 E9/L (ref 130–450)
PMV BLD AUTO: 11.4 FL (ref 7–12)
PMV BLD AUTO: 11.4 FL (ref 7–12)
PMV BLD AUTO: 11.6 FL (ref 7–12)
PO2: 109.1 MMHG (ref 75–100)
POIKILOCYTES: ABNORMAL
POLYCHROMASIA: ABNORMAL
POTASSIUM REFLEX MAGNESIUM: 4.4 MMOL/L (ref 3.5–5)
POTASSIUM REFLEX MAGNESIUM: 4.4 MMOL/L (ref 3.5–5)
POTASSIUM REFLEX MAGNESIUM: 4.5 MMOL/L (ref 3.5–5)
POTASSIUM SERPL-SCNC: 4.4 MMOL/L (ref 3.5–5)
POTASSIUM SERPL-SCNC: 4.5 MMOL/L (ref 3.5–5)
PRO-BNP: ABNORMAL PG/ML (ref 0–450)
PROCALCITONIN: 26.97 NG/ML (ref 0–0.08)
PROCALCITONIN: 28.79 NG/ML (ref 0–0.08)
PROTEIN UA: >=300 MG/DL
RBC # BLD: 3.07 E12/L (ref 3.8–5.8)
RBC # BLD: 3.6 E12/L (ref 3.8–5.8)
RBC # BLD: 3.6 E12/L (ref 3.8–5.8)
RBC UA: ABNORMAL /HPF (ref 0–2)
SODIUM BLD-SCNC: 146 MMOL/L (ref 132–146)
SODIUM BLD-SCNC: 147 MMOL/L (ref 132–146)
SODIUM BLD-SCNC: 147 MMOL/L (ref 132–146)
SODIUM BLD-SCNC: 148 MMOL/L (ref 132–146)
SODIUM BLD-SCNC: 149 MMOL/L (ref 132–146)
SOURCE, BLOOD GAS: ABNORMAL
SPECIFIC GRAVITY UA: 1.02 (ref 1–1.03)
THB: 10.8 G/DL (ref 11.5–16.5)
TIME ANALYZED: 1148
TOTAL PROTEIN: 6.2 G/DL (ref 6.4–8.3)
TOTAL PROTEIN: 6.5 G/DL (ref 6.4–8.3)
UROBILINOGEN, URINE: 2 E.U./DL
VACUOLATED NEUTROPHILS: ABNORMAL
WBC # BLD: 14.7 E9/L (ref 4.5–11.5)
WBC # BLD: 15 E9/L (ref 4.5–11.5)
WBC # BLD: 17 E9/L (ref 4.5–11.5)
WBC UA: >20 /HPF (ref 0–5)

## 2022-12-03 PROCEDURE — 80074 ACUTE HEPATITIS PANEL: CPT

## 2022-12-03 PROCEDURE — 74176 CT ABD & PELVIS W/O CONTRAST: CPT

## 2022-12-03 PROCEDURE — 93005 ELECTROCARDIOGRAM TRACING: CPT | Performed by: FAMILY MEDICINE

## 2022-12-03 PROCEDURE — 2500000003 HC RX 250 WO HCPCS

## 2022-12-03 PROCEDURE — 87186 SC STD MICRODIL/AGAR DIL: CPT

## 2022-12-03 PROCEDURE — 83880 ASSAY OF NATRIURETIC PEPTIDE: CPT

## 2022-12-03 PROCEDURE — 6370000000 HC RX 637 (ALT 250 FOR IP): Performed by: PHYSICAL THERAPY ASSISTANT

## 2022-12-03 PROCEDURE — 70450 CT HEAD/BRAIN W/O DYE: CPT

## 2022-12-03 PROCEDURE — 94664 DEMO&/EVAL PT USE INHALER: CPT

## 2022-12-03 PROCEDURE — 6360000002 HC RX W HCPCS

## 2022-12-03 PROCEDURE — 2000000000 HC ICU R&B

## 2022-12-03 PROCEDURE — 82805 BLOOD GASES W/O2 SATURATION: CPT

## 2022-12-03 PROCEDURE — 51702 INSERT TEMP BLADDER CATH: CPT

## 2022-12-03 PROCEDURE — 82962 GLUCOSE BLOOD TEST: CPT

## 2022-12-03 PROCEDURE — 71045 X-RAY EXAM CHEST 1 VIEW: CPT

## 2022-12-03 PROCEDURE — 84100 ASSAY OF PHOSPHORUS: CPT

## 2022-12-03 PROCEDURE — 2580000003 HC RX 258: Performed by: PHYSICAL THERAPY ASSISTANT

## 2022-12-03 PROCEDURE — 99291 CRITICAL CARE FIRST HOUR: CPT | Performed by: INTERNAL MEDICINE

## 2022-12-03 PROCEDURE — 80048 BASIC METABOLIC PNL TOTAL CA: CPT

## 2022-12-03 PROCEDURE — 80053 COMPREHEN METABOLIC PANEL: CPT

## 2022-12-03 PROCEDURE — 86703 HIV-1/HIV-2 1 RESULT ANTBDY: CPT

## 2022-12-03 PROCEDURE — 83735 ASSAY OF MAGNESIUM: CPT

## 2022-12-03 PROCEDURE — 94640 AIRWAY INHALATION TREATMENT: CPT

## 2022-12-03 PROCEDURE — 84166 PROTEIN E-PHORESIS/URINE/CSF: CPT

## 2022-12-03 PROCEDURE — 6370000000 HC RX 637 (ALT 250 FOR IP)

## 2022-12-03 PROCEDURE — 2580000003 HC RX 258: Performed by: INTERNAL MEDICINE

## 2022-12-03 PROCEDURE — 6370000000 HC RX 637 (ALT 250 FOR IP): Performed by: FAMILY MEDICINE

## 2022-12-03 PROCEDURE — 6360000002 HC RX W HCPCS: Performed by: INTERNAL MEDICINE

## 2022-12-03 PROCEDURE — 87206 SMEAR FLUORESCENT/ACID STAI: CPT

## 2022-12-03 PROCEDURE — 87040 BLOOD CULTURE FOR BACTERIA: CPT

## 2022-12-03 PROCEDURE — 81001 URINALYSIS AUTO W/SCOPE: CPT

## 2022-12-03 PROCEDURE — 74018 RADEX ABDOMEN 1 VIEW: CPT

## 2022-12-03 PROCEDURE — 6360000002 HC RX W HCPCS: Performed by: FAMILY MEDICINE

## 2022-12-03 PROCEDURE — 2700000000 HC OXYGEN THERAPY PER DAY

## 2022-12-03 PROCEDURE — 82140 ASSAY OF AMMONIA: CPT

## 2022-12-03 PROCEDURE — 84145 PROCALCITONIN (PCT): CPT

## 2022-12-03 PROCEDURE — 85025 COMPLETE CBC W/AUTO DIFF WBC: CPT

## 2022-12-03 PROCEDURE — 36415 COLL VENOUS BLD VENIPUNCTURE: CPT

## 2022-12-03 PROCEDURE — 87389 HIV-1 AG W/HIV-1&-2 AB AG IA: CPT

## 2022-12-03 PROCEDURE — 83605 ASSAY OF LACTIC ACID: CPT

## 2022-12-03 PROCEDURE — 86334 IMMUNOFIX E-PHORESIS SERUM: CPT

## 2022-12-03 PROCEDURE — 87088 URINE BACTERIA CULTURE: CPT

## 2022-12-03 RX ORDER — DEXTROSE MONOHYDRATE 50 MG/ML
INJECTION, SOLUTION INTRAVENOUS CONTINUOUS
Status: DISCONTINUED | OUTPATIENT
Start: 2022-12-03 | End: 2022-12-05 | Stop reason: HOSPADM

## 2022-12-03 RX ORDER — IPRATROPIUM BROMIDE AND ALBUTEROL SULFATE 2.5; .5 MG/3ML; MG/3ML
1 SOLUTION RESPIRATORY (INHALATION)
Status: DISCONTINUED | OUTPATIENT
Start: 2022-12-03 | End: 2022-12-05 | Stop reason: HOSPADM

## 2022-12-03 RX ORDER — SODIUM CHLORIDE, SODIUM LACTATE, POTASSIUM CHLORIDE, AND CALCIUM CHLORIDE .6; .31; .03; .02 G/100ML; G/100ML; G/100ML; G/100ML
1000 INJECTION, SOLUTION INTRAVENOUS ONCE
Status: COMPLETED | OUTPATIENT
Start: 2022-12-03 | End: 2022-12-03

## 2022-12-03 RX ORDER — SODIUM CHLORIDE, SODIUM LACTATE, POTASSIUM CHLORIDE, CALCIUM CHLORIDE 600; 310; 30; 20 MG/100ML; MG/100ML; MG/100ML; MG/100ML
INJECTION, SOLUTION INTRAVENOUS CONTINUOUS
Status: DISCONTINUED | OUTPATIENT
Start: 2022-12-03 | End: 2022-12-05 | Stop reason: HOSPADM

## 2022-12-03 RX ORDER — METHOCARBAMOL 750 MG/1
750 TABLET, FILM COATED ORAL 4 TIMES DAILY
Status: CANCELLED | OUTPATIENT
Start: 2022-12-03

## 2022-12-03 RX ADMIN — SODIUM CHLORIDE, PRESERVATIVE FREE 10 ML: 5 INJECTION INTRAVENOUS at 10:03

## 2022-12-03 RX ADMIN — LAMOTRIGINE 200 MG: 100 TABLET ORAL at 10:01

## 2022-12-03 RX ADMIN — MEROPENEM 1000 MG: 1 INJECTION, POWDER, FOR SOLUTION INTRAVENOUS at 21:10

## 2022-12-03 RX ADMIN — IPRATROPIUM BROMIDE AND ALBUTEROL SULFATE 1 AMPULE: 2.5; .5 SOLUTION RESPIRATORY (INHALATION) at 12:00

## 2022-12-03 RX ADMIN — SODIUM CHLORIDE, POTASSIUM CHLORIDE, SODIUM LACTATE AND CALCIUM CHLORIDE: 600; 310; 30; 20 INJECTION, SOLUTION INTRAVENOUS at 21:29

## 2022-12-03 RX ADMIN — ENOXAPARIN SODIUM 30 MG: 100 INJECTION SUBCUTANEOUS at 09:52

## 2022-12-03 RX ADMIN — SODIUM CHLORIDE, PRESERVATIVE FREE 10 ML: 5 INJECTION INTRAVENOUS at 21:21

## 2022-12-03 RX ADMIN — SODIUM CHLORIDE, POTASSIUM CHLORIDE, SODIUM LACTATE AND CALCIUM CHLORIDE 1000 ML: 600; 310; 30; 20 INJECTION, SOLUTION INTRAVENOUS at 15:00

## 2022-12-03 RX ADMIN — Medication 5 MCG/MIN: at 18:34

## 2022-12-03 RX ADMIN — IPRATROPIUM BROMIDE AND ALBUTEROL SULFATE 1 AMPULE: 2.5; .5 SOLUTION RESPIRATORY (INHALATION) at 20:42

## 2022-12-03 RX ADMIN — IPRATROPIUM BROMIDE AND ALBUTEROL SULFATE 1 AMPULE: 2.5; .5 SOLUTION RESPIRATORY (INHALATION) at 11:02

## 2022-12-03 RX ADMIN — DEXTROSE MONOHYDRATE: 50 INJECTION, SOLUTION INTRAVENOUS at 20:14

## 2022-12-03 RX ADMIN — PANTOPRAZOLE SODIUM 40 MG: 40 TABLET, DELAYED RELEASE ORAL at 10:02

## 2022-12-03 RX ADMIN — LEVOFLOXACIN 500 MG: 5 INJECTION, SOLUTION INTRAVENOUS at 10:13

## 2022-12-03 RX ADMIN — VENLAFAXINE 300 MG: 75 TABLET ORAL at 09:55

## 2022-12-03 RX ADMIN — GABAPENTIN 100 MG: 100 CAPSULE ORAL at 09:53

## 2022-12-03 RX ADMIN — ACETAMINOPHEN 650 MG: 325 TABLET ORAL at 09:54

## 2022-12-03 RX ADMIN — ACETAMINOPHEN 650 MG: 650 SUPPOSITORY RECTAL at 06:46

## 2022-12-03 RX ADMIN — METHOCARBAMOL 750 MG: 750 TABLET ORAL at 10:04

## 2022-12-03 RX ADMIN — VANCOMYCIN HYDROCHLORIDE 1500 MG: 10 INJECTION, POWDER, LYOPHILIZED, FOR SOLUTION INTRAVENOUS at 21:34

## 2022-12-03 RX ADMIN — SODIUM CHLORIDE, POTASSIUM CHLORIDE, SODIUM LACTATE AND CALCIUM CHLORIDE: 600; 310; 30; 20 INJECTION, SOLUTION INTRAVENOUS at 18:35

## 2022-12-03 RX ADMIN — IPRATROPIUM BROMIDE AND ALBUTEROL SULFATE 1 AMPULE: 2.5; .5 SOLUTION RESPIRATORY (INHALATION) at 17:49

## 2022-12-03 ASSESSMENT — PAIN DESCRIPTION - PAIN TYPE: TYPE: SURGICAL PAIN

## 2022-12-03 ASSESSMENT — PAIN DESCRIPTION - ORIENTATION: ORIENTATION: RIGHT

## 2022-12-03 ASSESSMENT — PAIN SCALES - WONG BAKER
WONGBAKER_NUMERICALRESPONSE: 0
WONGBAKER_NUMERICALRESPONSE: 0

## 2022-12-03 ASSESSMENT — PAIN DESCRIPTION - FREQUENCY: FREQUENCY: INTERMITTENT

## 2022-12-03 ASSESSMENT — PAIN DESCRIPTION - LOCATION: LOCATION: LEG

## 2022-12-03 ASSESSMENT — PAIN - FUNCTIONAL ASSESSMENT
PAIN_FUNCTIONAL_ASSESSMENT: PREVENTS OR INTERFERES SOME ACTIVE ACTIVITIES AND ADLS
PAIN_FUNCTIONAL_ASSESSMENT: PREVENTS OR INTERFERES SOME ACTIVE ACTIVITIES AND ADLS

## 2022-12-03 ASSESSMENT — PAIN SCALES - GENERAL
PAINLEVEL_OUTOF10: 4
PAINLEVEL_OUTOF10: 0
PAINLEVEL_OUTOF10: 0
PAINLEVEL_OUTOF10: 4
PAINLEVEL_OUTOF10: 0
PAINLEVEL_OUTOF10: 0

## 2022-12-03 ASSESSMENT — PAIN DESCRIPTION - DESCRIPTORS: DESCRIPTORS: ACHING;DISCOMFORT

## 2022-12-03 ASSESSMENT — PAIN DESCRIPTION - ONSET: ONSET: ON-GOING

## 2022-12-03 NOTE — PROGRESS NOTES
Hospitalist Progress Note      SYNOPSIS: Patient admitted on 2022 for Other fracture of right femur, initial encounter for closed fracture ([facility] [address]      SUBJECTIVE:    Patient seen and examined. On exam today patient was barely awake even with sternal rub. I did tell his nurses to call rapid response. Records reviewed. Patient is a 76 y.o. male with a past medical history of hypertension, depression, osteoporosis who presents with the chief complaint of right knee pain following a fall from standing height. Patient states that he was taking a step up from his living room to his kitchen and as he went to take a step he felt his knee buckle fell to the ground. Denies any his head denies loss of consciousness. He states he had immediate onset pain to the right knee and subsequently sought medical treatment at [facility]. Was found to have a periprosthetic distal femur fracture. Transferred to [facility] for definitive care and treatment. Status post distal femur ORIF on 2022. Given altered mental status urinalysis and urine culture has been ordered to rule out UTI. Urine culture with E.coli. He was started on Levaquin renally dose due to anaphylaxis allergy with rocephin. Kidney function continues to worsen with hematuria, so urology consulted. Nephrology following for CARRINGTON. Stable overnight. No other overnight issues reported. Temp (24hrs), Av.8 °F (37.7 °C), Min:98.6 °F (37 °C), Max:101 °F (38.3 °C)    DIET: ADULT ORAL NUTRITION SUPPLEMENT; Breakfast, Lunch, Dinner; Diabetic Oral Supplement  ADULT ORAL NUTRITION SUPPLEMENT; Breakfast, Dinner; Wound Healing Oral Supplement  ADULT DIET;  Dysphagia - Minced and Moist; Mildly Thick (Nectar)  CODE: Full Code    Intake/Output Summary (Last 24 hours) at 12/3/2022 1105  Last data filed at 12/3/2022 0600  Gross per 24 hour   Intake 612.11 ml   Output 425 ml   Net 187.11 ml       OBJECTIVE:    BP 100/62   Pulse 80   Temp 100 °F (37.8 °C) Comment: tyl on board  Resp 16   Ht 6' 2\" (1.88 m)   Wt 212 lb (96.2 kg)   SpO2 92%   BMI 27.22 kg/m²     General appearance: No apparent distress, appears stated age and BLE weak even to sternal rub   HEENT:  Conjunctivae/corneas clear. Neck: Supple. No jugular venous distention. Respiratory: Coarseness crackles respiration and anteriorly to auscultation bilaterally, normal respiratory effort  Cardiovascular: Regular rate rhythm, normal S1-S2  Abdomen: Soft, nontender, nondistended  Musculoskeletal: No clubbing, cyanosis, no bilateral lower extremity edema. Brisk capillary refill. Skin:  No rashes  on visible skin  Neurologic: not  awake or alert or  following commands     ASSESSMENT:  S/P distal Right femur ORIF - 11/25/22  Sepsis  CARRINGTON on CKD  Altered mental status multifactorial, hypernatremia, UTI  E.coli UTI  Hypernatremia  Hematuria  Hypotension  Acute on chronic anemia, postoperative blood loss  Mild-moderate oropharyngeal phase dysphagia       PLAN:  1. Continue Levaquin 500 milligrams every 48 renally dosed for UTI. Creatinine function continues to worsen 5.2 today. Nephrology following. Urology was consulted due to hematuria. Hemoglobin 9.86 compared to 10.5 yesterday. Still stable monitor closely. hold Lovenox DVT prophylaxis due to hematuria. SCD due to his high risk for DVT PE given his recent distal femur ORIF. Appreciate nephrology's input concerning acute kidney injury. Continue monitor sodium 149 today. Infectious disease consultation given fever. Blood cultures ordered. Infectious disease consultation given fevers 101F today. Blood cultures ordered. RRT was called. He would likely benefit from stat CT head and also ABG. Chest x-ray. Patient will likely need transfer to a telemetry floor. Concern for sepsis with fever and altered mental status. BP was soft with systolic in the 74S. 1L LR ordered.     Called wife Denice Briones and updated her on care. All questions and concerns addressed. ABG lab work with pH of 7.32, PCO2 of 34.1. White count trending up was 17,000. Hemoglobin of 10. CT abdomen pelvis without contrast showed concern for bladder calculus and concern for evidence for emphysematous cystitis. Transferred to ICU. DISPOSITION:     Medications:  REVIEWED DAILY    Infusion Medications    dextrose 5 % and 0.45 % NaCl 100 mL/hr at 12/02/22 2222    sodium chloride       Scheduled Medications    ipratropium-albuterol  1 ampule Inhalation Q4H WA    levofloxacin  500 mg IntraVENous Q48H    gabapentin  100 mg Oral TID    enoxaparin  30 mg SubCUTAneous Daily    methocarbamol  750 mg Oral 4x Daily    [Held by provider] carvedilol  12.5 mg Oral Daily    [Held by provider] furosemide  20 mg Oral Daily    lamoTRIgine  200 mg Oral Daily    [Held by provider] lisinopril  5 mg Oral Daily    pantoprazole  40 mg Oral QAM AC    venlafaxine  300 mg Oral Daily    trospium  20 mg Oral Nightly    sodium chloride flush  10 mL IntraVENous 2 times per day    potassium chloride  20 mEq Oral BID WC     PRN Meds: hydrALAZINE, trimethobenzamide, benzocaine-menthol, morphine **OR** morphine, oxyCODONE **OR** oxyCODONE, sodium chloride flush, sodium chloride, polyethylene glycol, acetaminophen **OR** acetaminophen    Labs:     Recent Labs     12/01/22  1526 12/02/22  0526 12/03/22  0518   WBC 14.4* 10.8 15.0*   HGB 10.9* 10.5* 9.8*   HCT 33.4* 34.1* 31.8*    186 180       Recent Labs     12/02/22  1554 12/02/22  2252 12/03/22  0518   * 147* 149*  149*   K 4.4 4.1 4.4  4.4   * 117* 119*  119*   CO2 17* 17* 17*  17*   BUN 61* 63* 67*  68*   CREATININE 4.5* 4.9* 4.9*  5.2*   CALCIUM 9.5 9.7 9.2  9.5       No results for input(s): PROT, ALB, ALKPHOS, ALT, AST, BILITOT, AMYLASE, LIPASE in the last 72 hours. No results for input(s): INR in the last 72 hours.     Recent Labs     12/01/22  1526   CKTOTAL 117       Chronic labs:    Lab Results   Component Value Date    INR 1.2 11/24/2022       Radiology: REVIEWED DAILY    +++++++++++++++++++++++++++++++++++++++++++++++++  Kristofer Vásquez MD  [facility] - [address] [place] [address]  +++++++++++++++++++++++++++++++++++++++++++++++++  NOTE: This report was transcribed using voice recognition software. Every effort was made to ensure accuracy; however, inadvertent computerized transcription errors may be present.

## 2022-12-03 NOTE — CONSULTS
[address] [place] [address] [facility]    Consultation Note     Admit Date: 11/23/2022  8:11 PM    Reason for Consult:   UTI    Attending Physician:  Helder Batista MD     Chief Complaint: Altered mental status    HISTORY OF PRESENT ILLNESS:   76 y.o. male with a past medical history of hypertension, depression, osteoporosis who presents with the chief complaint of right knee pain following a fall from standing height. Found to have periprosthetic distal femur fracture. S/p distal femur ORIF 11/25. Patient was found to be altered after surgery and urine culture showed E. coli. Levofloxacin was started as patient has anaphylactic reaction to ceftriaxone. ID consulted for suspected UTI. Past Medical History:    History reviewed. No pertinent past medical history.   Past Surgical History:        Procedure Laterality Date    FEMUR FRACTURE SURGERY Right 11/25/2022    RIGHT PERIPROSTHETIC DISTAL FEMUR OPEN REDUCTION INTERNAL FIXATION performed by Brynn Tipton DO at [facility] OR     Current Medications:   Scheduled Meds:   ipratropium-albuterol  1 ampule Inhalation Q4H WA    levofloxacin  500 mg IntraVENous Q48H    [Held by provider] gabapentin  100 mg Oral TID    [Held by provider] enoxaparin  30 mg SubCUTAneous Daily    methocarbamol  750 mg Oral 4x Daily    [Held by provider] carvedilol  12.5 mg Oral Daily    [Held by provider] furosemide  20 mg Oral Daily    lamoTRIgine  200 mg Oral Daily    [Held by provider] lisinopril  5 mg Oral Daily    pantoprazole  40 mg Oral QAM AC    [Held by provider] venlafaxine  300 mg Oral Daily    trospium  20 mg Oral Nightly    sodium chloride flush  10 mL IntraVENous 2 times per day    potassium chloride  20 mEq Oral BID WC     Continuous Infusions:   norepinephrine      dextrose 5 % and 0.45 % NaCl Stopped (12/03/22 1140)    sodium chloride       PRN Meds:hydrALAZINE, trimethobenzamide, benzocaine-menthol, morphine **OR** morphine, oxyCODONE **OR** oxyCODONE, sodium chloride flush, sodium chloride, polyethylene glycol, acetaminophen **OR** acetaminophen    Allergies:  Ceftriaxone    Social History:   Social History     Socioeconomic History    Marital status:      Spouse name: None    Number of children: None    Years of education: None    Highest education level: None   Tobacco Use    Smoking status: Unknown    Smokeless tobacco: Never     Tobacco: No  Alcohol: No  Pets: No  Travel: No    Family History:   No family history on file. . Otherwise non-pertinent to the chief complaint. REVIEW OF SYSTEMS:    CONSTITUTIONAL:  No chills, fevers or night sweats. No loss of weight. EYES:  No double vision or drainage from eyes, ears or throat. HEENT:  No neck stiffness. No dysphagia. No drainage from eyes, ears or throat  RESPIRATORY:  No cough, productive sputum or hemoptysis. CARDIOVASCULAR:  No chest pain, palpitations, orthopnea or dyspnea on exertion. GASTROINTESTINAL:  No nausea, vomiting, diarrhea or constipation or hematochezia   GENITOURINARY:  No frequency burning dysuria or hematuria. INTEGUMENT/BREAST:  No rash or breast masses. HEMATOLOGIC/LYMPHATIC:  No lymphadenopathy or blood dyscrasics. ALLERGIC/IMMUNOLOGIC:  No anaphylaxis. ENDOCRINE:  No polyuria or polydipsia or temperature intolerance. MUSCULOSKELETAL:  No myalgia or arthralgia. Full ROM. NEUROLOGICAL:  No focal motor sensory deficit. BEHAVIOR/PSYCH:  No psychosis. PHYSICAL EXAM:    Vitals:    BP (!) 86/52   Pulse 92   Temp 99 °F (37.2 °C) (Axillary)   Resp 20   Ht 6' 2\" (1.88 m)   Wt 212 lb (96.2 kg)   SpO2 98%   BMI 27.22 kg/m²   Constitutional: Patient opens eyes on verbal command but not following commands  Skin: Warm and dry. No rashes were noted. No jaundice. HEENT: Eyes show round, and reactive pupils. Moist mucous membranes, no ulcerations, no thrush. Neck: Supple to movements. No lymphadenopathy. Chest: No use of accessory muscles to breathe.  Symmetrical expansion. Auscultation reveals no wheezing, crackles, or rhonchi. Cardiovascular: S1 and S2 are rhythmic and regular. No murmurs appreciated. Abdomen: Positive bowel sounds to auscultation. Benign to palpation. No masses felt. No hepatosplenomegaly. Genitourinary: No pain in the lower abdomen  Extremities: No clubbing, no cyanosis, no edema. Musculoskeletal: Right femoral ORIF noted.   Neurological: Not following commands, no focal neurodeficit  Lines: peripheral      CBC+dif:  Recent Labs     12/03/22  0518 12/03/22  1135 12/03/22  1302   WBC 15.0* 17.0* 14.7*   HGB 9.8* 10.0* 8.5*   HCT 31.8* 31.9* 27.4*   MCV 88.3 88.6 89.3    188 159   NEUTROABS 13.80* 15.47* 12.20*     No results found for: CRP  No results found for: CRPHS  No results found for: SEDRATE  Lab Results   Component Value Date    ALT 77 (H) 12/03/2022     (H) 12/03/2022    ALKPHOS 152 (H) 12/03/2022    BILITOT 0.7 12/03/2022     Lab Results   Component Value Date/Time     12/03/2022 11:50 AM    K 4.4 12/03/2022 11:50 AM    K 4.5 12/03/2022 10:56 AM     12/03/2022 11:50 AM    CO2 17 12/03/2022 11:50 AM    BUN 68 12/03/2022 11:50 AM    CREATININE 5.3 12/03/2022 11:50 AM    LABGLOM 10 12/03/2022 11:50 AM    GLUCOSE 188 12/03/2022 11:50 AM    PROT 6.2 12/03/2022 11:50 AM    LABALBU 2.2 12/03/2022 11:50 AM    CALCIUM 9.3 12/03/2022 11:50 AM    BILITOT 0.7 12/03/2022 11:50 AM    ALKPHOS 152 12/03/2022 11:50 AM     12/03/2022 11:50 AM    ALT 77 12/03/2022 11:50 AM       Lab Results   Component Value Date/Time    PROTIME 12.6 11/24/2022 12:05 AM    INR 1.2 11/24/2022 12:05 AM       No results found for: TSH    Lab Results   Component Value Date/Time    COLORU Yellow 11/30/2022 11:59 AM    PHUR 6.0 11/30/2022 11:59 AM    WBCUA 10-20 11/30/2022 11:59 AM    RBCUA 10-20 11/30/2022 11:59 AM    BACTERIA MODERATE 11/30/2022 11:59 AM    CLARITYU Clear 11/30/2022 11:59 AM    SPECGRAV 1.025 11/30/2022 11:59 AM    LEUKOCYTESUR MODERATE 11/30/2022 11:59 AM    UROBILINOGEN 0.2 11/30/2022 11:59 AM    BILIRUBINUR Negative 11/30/2022 11:59 AM    BLOODU LARGE 11/30/2022 11:59 AM    GLUCOSEU Negative 11/30/2022 11:59 AM       No results found for: EEP4HRY, BEART, F4CAACHS, PHART, THGBART, MEH3ZYP, PO2ART, XDV3FXT  Radiology:  XR ABDOMEN (KUB) (SINGLE AP VIEW)   Final Result   Gaseous distended and dilated colonic segments concerning for ileus pattern. Paucity of air in the small bowel. XR CHEST PORTABLE   Final Result   No acute cardiopulmonary process         CT HEAD WO CONTRAST   Final Result   No acute intracranial abnormality. Severely advanced chronic small vessel ischemic disease along with cerebral   atrophy noted         CT ABDOMEN PELVIS WO CONTRAST Additional Contrast? 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 Cholelithiasis without gallbladder wall thickening or pericholecystic fluid         XR CHEST PORTABLE   Final Result   Chronic findings. No acute cardiopulmonary disease. US RETROPERITONEAL COMPLETE   Final Result   No evidence of hydronephrosis is seen. 2.7 cm hyperechoic stone visualized within the urinary bladder. XR FEMUR RIGHT (MIN 2 VIEWS)   Final Result   Status post right femoral ORIF with improved anatomic alignment spanning the   right distal femoral fracture with expected postsurgical changes in the soft   tissues         FLUORO FOR SURGICAL PROCEDURES   Final Result   Intraprocedural fluoroscopic spot images as above. See separate procedure   report for more information.          XR CHEST 1 VIEW   Final Result   No acute cardiopulmonary process. CT FEMUR RIGHT WO CONTRAST   Final Result   1. Supracondylar region femoral fracture with displacement, immediately   cephalad to the knee replacement. 2. Further details and incidental findings are described above. XR KNEE RIGHT (1-2 VIEWS)   Final Result   Angulated and impacted supracondylar fracture. XR FEMUR RIGHT (MIN 2 VIEWS)   Final Result   Angulated and impacted supracondylar fracture. Microbiology:  Pending  No results for input(s): BC in the last 72 hours. No results for input(s): ORG in the last 72 hours. No results for input(s): Shearon Jhoana in the last 72 hours. No results for input(s): STREPNEUMAGU in the last 72 hours. No results for input(s): LP1UAG in the last 72 hours. No results for input(s): ASO in the last 72 hours. No results for input(s): CULTRESP in the last 72 hours. Assessment:  Pansensitive E. coli UTI  Ceftriaxone allergy with anaphylaxis  Altered mental status  Toxic metabolic encephalopathy  Right femoral fracture s/p ORIF 11/25  Hematuria    Plan:    Continue Levaquin in view of ceftriaxone allergy. Patient is not requiring any pressors. Continue blood culture  Patient continues to be altered  Hematuria management as per urology    Thank you for having us see this patient in consultation. I will be discussing this case with the treating physicians.       Electronically signed by Mansoor Fall MD on 12/3/2022 at 5:08 PM

## 2022-12-03 NOTE — CONSULTS
[facility]  Internal Medicine Residency Program  Consult Note  MICU    Patient:  Macario Snow 76 y.o. male MRN: [id number]     Date of Service: 12/3/2022    Hospital Day: 6      Chief complaint: AMS    History of Present Illness     History obtained from patient's daughter and EMR. The patient is a 76 y.o. male with past medical history significant for remote history of stroke with residual right-sided weakness, hypertension, depression, osteoporosis, right hip replacement who is status post open reduction and internal fixation (ORIF) of right periprosthetic distal femoral fracture after he was transferred from [facility] complaining of right knee pain after a fall from a standing height for definitive care. RRT was called on this patient in the morning for worsening altered mental state. Patient has been noted to have worsening of his mental state since after the ORIF (11/25/2022). Daughter stated that patient has not been himself and has been increasingly confused. He was recently found to have E. Coli urinary tract infection was started on Levaquin which she has been getting. He also had episodes of fever spike with T-max of 101 °F overnight and is also being managed for CARRINGTON with possible CKD, nephrology following. Urology was also consulted by primary team for new onset hematuria that has persisted. Upon her arrival, patient was found tachypneic not opening eyes to call or physical stimulation and not obeying commands but withdraws to noxious stimulation by flexing and grimacing. BP was initially 91/60 but later became hypotensive with SBP in the 80s. Morning labs were reviewed which showed bicarb 17, BUN 68, creatinine level 5.3, WBC count 17 with mild transaminases (, ALT 77, ).   Blood glucose was normal 195, ammonia 34, lactic acid normal 1.6 and ABG was obtained which showed pH 7.32, PCO2 34.1, PaO2 119.1, bicarb 17.3 suggestive of metabolic acidosis well compensated. EKG obtained was unremarkable. Chest x-ray was unremarkable for acute pulmonary process and CT head without contrast was only remarkable for severely advanced chronic small vessel ischemia with cerebral atrophy. 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 Metoprolol and Eliquis were held and patient was given IV fluids 1 L and transferred to MICU for further care and management. Past Medical History:  History reviewed. No pertinent past medical history. Past Surgical History:        Procedure Laterality Date    FEMUR FRACTURE SURGERY Right 11/25/2022    RIGHT PERIPROSTHETIC DISTAL FEMUR OPEN REDUCTION INTERNAL FIXATION performed by Torin Arora DO at [facility] OR     Medications Prior to Admission:    Prior to Admission medications    Medication Sig Start Date End Date Taking?  Authorizing Provider   aspirin 325 MG EC tablet Take 1 tablet by mouth 2 times daily 11/26/22 11/26/23 Yes Torin Arora DO   carvedilol (COREG) 12.5 MG tablet Take 12.5 mg by mouth daily   Yes Historical Provider, MD   omeprazole (PRILOSEC) 20 MG delayed release capsule Take 20 mg by mouth daily   Yes Historical Provider, MD   venlafaxine (EFFEXOR) 50 MG tablet Take 300 mg by mouth daily   Yes Historical Provider, MD   lisinopril (PRINIVIL;ZESTRIL) 5 MG tablet Take 5 mg by mouth daily   Yes Historical Provider, MD   furosemide (LASIX) 20 MG tablet Take 20 mg by mouth daily   Yes Historical Provider, MD   potassium chloride (KLOR-CON M) 10 MEQ extended release tablet Take 10 mEq by mouth daily   Yes Historical Provider, MD   sulfamethoxazole-trimethoprim (BACTRIM DS;SEPTRA DS) 800-160 MG per tablet Take 1 tablet by mouth 2 times daily End date 11-23-22   Yes Historical Provider, MD   Vibegron 75 MG TABS Take 75 mg by mouth daily   Yes Historical Provider, MD   lamoTRIgine (LAMICTAL) 200 MG tablet Take 200 mg by mouth daily   Yes Historical Provider, MD       Allergies:  Ceftriaxone    Social History:   TOBACCO:   reports that he has an unknown smoking status. He has never used smokeless tobacco.  ETOH:   has no history on file for alcohol use. Family History:   No family history on file.     REVIEW OF SYSTEMS:    Not obtained due to AMS    Physical Exam   Vitals: BP (!) 113/50   Pulse 75   Temp 97.9 °F (36.6 °C) (Axillary)   Resp 24   Ht 6' 2\" (1.88 m)   Wt 212 lb (96.2 kg)   SpO2 96%   BMI 27.22 kg/m²       General Appearance: altered but respond to noxious stimulation by flexing limbs, moderate respiratory distress  Skin: warm and dry, no rash or erythema  Head: normocephalic and atraumatic  Eyes: pupils equal, round, and reactive to light, extraocular eye movements intact, conjunctivae normal  Neck: supple and non-tender without mass, no thyromegaly or thyroid nodules, no cervical lymphadenopathy  Pulmonary/Chest: Diffuse crackles bilateral lung zones  Cardiovascular: normal rate, regular rhythm, normal S1 and S2, no murmurs, rubs, clicks, or gallops, distal pulses intact, no carotid bruits  Abdomen: soft, non-tender, non-distended, normal bowel sounds, no masses or organomegaly  Extremities: Right lower extremity dressing slightly soaked (RN to wound for proper inspection/examination), no cyanosis, clubbing or edema  Musculoskeletal: No obvious deformity  Neurologic: reflexes normal and symmetric  Lines     site day    Art line   None    TLC None    PICC None    Hemoaccess None    Oxygen:    nasal canula at 4 L/min/face mask     ABG:     Lab Results   Component Value Date/Time    PH 7.323 12/03/2022 11:48 AM    PCO2 34.1 12/03/2022 11:48 AM    PO2 109.1 12/03/2022 11:48 AM    HCO3 17.3 12/03/2022 11:48 AM    BE -7.9 12/03/2022 11:48 AM    THB 10.8 12/03/2022 11:48 AM    O2SAT 97.8 12/03/2022 11:48 AM     Labs and Imaging Studies   Basic Labs  CBC:   Lab Results   Component Value Date/Time    WBC 14.7 12/03/2022 01:02 PM    RBC 3.07 12/03/2022 01:02 PM    HGB 8.5 12/03/2022 01:02 PM    HCT 27.4 12/03/2022 01:02 PM    MCV 89.3 12/03/2022 01:02 PM    RDW 20.2 12/03/2022 01:02 PM     12/03/2022 01:02 PM     CMP:  Lab Results   Component Value Date/Time     12/03/2022 04:01 PM     12/03/2022 04:01 PM    K 4.4 12/03/2022 04:01 PM    K 4.4 12/03/2022 04:01 PM    K 4.4 12/03/2022 11:50 AM     12/03/2022 04:01 PM     12/03/2022 04:01 PM    CO2 16 12/03/2022 04:01 PM    CO2 16 12/03/2022 04:01 PM    BUN 69 12/03/2022 04:01 PM    BUN 68 12/03/2022 04:01 PM    PROT 6.2 12/03/2022 11:50 AM     PTT:    Lab Results   Component Value Date/Time    APTT 28.7 11/24/2022 12:05 AM     Imaging Studies:     CT ABDOMEN PELVIS WO CONTRAST Additional Contrast? None    Result Date: 12/3/2022  EXAMINATION: CT OF THE ABDOMEN AND PELVIS WITHOUT CONTRAST 12/3/2022 12:25 pm TECHNIQUE: CT of the abdomen and pelvis was performed without the administration of intravenous contrast. Multiplanar reformatted images are provided for review. Automated exposure control, iterative reconstruction, and/or weight based adjustment of the mA/kV was utilized to reduce the radiation dose to as low as reasonably achievable. COMPARISON: None. HISTORY: ORDERING SYSTEM PROVIDED HISTORY: RRT, abdominal tenderness TECHNOLOGIST PROVIDED HISTORY: Reason for exam:->RRT, abdominal tenderness Additional Contrast?->None What reading provider will be dictating this exam?->CRC FINDINGS: Lower Chest: Lung bases reveal atelectasis at the right lung base without consolidation or effusion Organs: Liver without focal lesion. Gallbladder demonstrates layering density in the dependent portion most notable in the body and fundus likely sludge with stone in the proximal body of cholelithiasis however no wall thickening or biliary dilatation. .  Pancreas and spleen unremarkable. Adrenals without nodule. Kidneys without suspicious renal lesion and no hydronephrosis. GI/Bowel: No focal thickening or disproportion dilatation of bowel. No inflammatory findings. 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 Peritoneum/Retroperitoneum: No bulky retroperitoneal adenopathy. No suspicious peritoneal or mesenteric process Vasculature: Grossly normal caliber of abdominal aorta and vasculature Bones/Soft Tissues: No acute osseous or soft tissue findings. Sacral nerve stimulator in place. Right hip arthroplasty in place without evidence of loosening failure. Associated artifact. 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  Cholelithiasis without gallbladder wall thickening or pericholecystic fluid     XR FEMUR RIGHT (MIN 2 VIEWS)    Result Date: 11/25/2022  EXAMINATION: 14 XRAY VIEWS OF THE RIGHT FEMUR 11/25/2022 2:00 pm COMPARISON: X-ray 11/23/2022, CT femur 11/23/2022 HISTORY: ORDERING SYSTEM PROVIDED HISTORY: post op TECHNOLOGIST PROVIDED HISTORY: Reason for exam:->post op What reading provider will be dictating this exam?->CRC FINDINGS: Status post right femoral ORIF with plate and screw fixation hardware along the lateral margin along with right total knee arthroplasty remain in place spanning the supracondylar fracture of the distal femur. .  Expected postsurgical changes and anatomic alignment evident     Status post right femoral ORIF with improved anatomic alignment spanning the right distal femoral fracture with expected postsurgical changes in the soft tissues     XR FEMUR RIGHT (MIN 2 VIEWS)    Result Date: 11/23/2022  EXAMINATION: XRAY VIEWS OF THE RIGHT FEMUR 11/23/2022 8:49 pm COMPARISON: None. HISTORY: ORDERING SYSTEM PROVIDED HISTORY: Fall from standing, reported distal periprosthetic femur fracture TECHNOLOGIST PROVIDED HISTORY: Reason for exam:->Fall from standing, reported distal periprosthetic femur fracture What reading provider will be dictating this exam?->CRC FINDINGS: Supracondylar fracture with apparent angulation and impaction is at the femur along the superior margin of knee replacement. Bone mineral density appears low. Hip replacement present. Angulated and impacted supracondylar fracture. XR KNEE RIGHT (1-2 VIEWS)    Result Date: 11/23/2022  EXAMINATION: TWO XRAY VIEWS OF THE RIGHT KNEE 11/23/2022 8:49 pm COMPARISON: None. HISTORY: ORDERING SYSTEM PROVIDED HISTORY: periprosthetic distal femur fracture TECHNOLOGIST PROVIDED HISTORY: Reason for exam:->periprosthetic distal femur fracture What reading provider will be dictating this exam?->CRC FINDINGS: Supracondylar fracture with apparent angulation and impaction is at the femur along the superior margin of knee replacement. Bone mineral density appears low. Angulated and impacted supracondylar fracture. XR ABDOMEN (KUB) (SINGLE AP VIEW)    Result Date: 12/3/2022  EXAMINATION: ONE SUPINE XRAY VIEW(S) OF THE ABDOMEN 12/3/2022 12:47 pm COMPARISON: None. HISTORY: ORDERING SYSTEM PROVIDED HISTORY: AMS TECHNOLOGIST PROVIDED HISTORY: Reason for exam:->AMS What reading provider will be dictating this exam?->CRC FINDINGS: Gaseous distended and dilated colonic segments concerning for ileus pattern.  Small bowel is nondilated and not as well visualized. No obvious intra-abdominal free air. Right sacral nerve stimulator in place along with right hip arthroplasty and degenerative changes of the spine     Gaseous distended and dilated colonic segments concerning for ileus pattern. Paucity of air in the small bowel. CT HEAD WO CONTRAST    Result Date: 12/3/2022  EXAMINATION: CT OF THE HEAD WITHOUT CONTRAST  12/3/2022 12:25 pm TECHNIQUE: CT of the head was performed without the administration of intravenous contrast. Automated exposure control, iterative reconstruction, and/or weight based adjustment of the mA/kV was utilized to reduce the radiation dose to as low as reasonably achievable. COMPARISON: None. HISTORY: ORDERING SYSTEM PROVIDED HISTORY: altered mental status TECHNOLOGIST PROVIDED HISTORY: Reason for exam:->altered mental status Has a \"code stroke\" or \"stroke alert\" been called? ->No What reading provider will be dictating this exam?->CRC FINDINGS: BRAIN/VENTRICLES: There is no acute intracranial hemorrhage, mass effect or midline shift. No abnormal extra-axial fluid collection. The gray-white differentiation is maintained without evidence of an acute infarct. There is no evidence of hydrocephalus. Severe low attenuation in the periventricular deep white matter of severe chronic small vessel ischemic disease along with mild-to-moderate generalized atrophy and prominence of the sulci ORBITS: The visualized portion of the orbits demonstrate no acute abnormality. SINUSES: The visualized paranasal sinuses and mastoid air cells demonstrate no acute abnormality. SOFT TISSUES/SKULL:  No acute abnormality of the visualized skull or soft tissues. No acute intracranial abnormality.  Severely advanced chronic small vessel ischemic disease along with cerebral atrophy noted     XR CHEST PORTABLE    Result Date: 12/3/2022  EXAMINATION: ONE XRAY VIEW OF THE CHEST 12/3/2022 12:42 pm COMPARISON: Chest x-ray 12/02/2022 HISTORY: ORDERING SYSTEM PROVIDED HISTORY: cough TECHNOLOGIST PROVIDED HISTORY: Reason for exam:->cough What reading provider will be dictating this exam?->CRC FINDINGS: Cardiac size within normal limits. Pulmonary vascularity within normal limits. No focal opacification or consolidation. No pneumothorax or pleural effusion. Status post median sternotomy     No acute cardiopulmonary process     XR CHEST PORTABLE    Result Date: 12/2/2022  EXAMINATION: ONE XRAY VIEW OF THE CHEST 12/2/2022 12:42 pm COMPARISON: 11/23/2022. HISTORY: ORDERING SYSTEM PROVIDED HISTORY: fu TECHNOLOGIST PROVIDED HISTORY: Reason for exam:->fu What reading provider will be dictating this exam?->CRC FINDINGS: The patient is status post sternotomy. The cardiac silhouette is normal in size. There are chronic findings in both lungs. No pulmonary consolidation or collapse is identified. No pneumothorax or pleural effusion is seen. The thoracic spine demonstrates degenerative changes. Chronic findings. No acute cardiopulmonary disease. XR CHEST 1 VIEW    Result Date: 11/24/2022  EXAMINATION: ONE XRAY VIEW OF THE CHEST11/23/2022 TECHNIQUE: Frontal view submitted COMPARISON: None. HISTORY: ORDERING SYSTEM PROVIDED HISTORY: pre-operative planning and medical clearance TECHNOLOGIST PROVIDED HISTORY: Reason for exam:->pre-operative planning and medical clearance What reading provider will be dictating this exam?->CRC FINDINGS: The lungs are clear without focal consolidation, large pleural effusion, or pneumothorax. The cardiomediastinal silhouette is stable. No acute cardiopulmonary process. CT FEMUR RIGHT WO CONTRAST    Result Date: 11/23/2022  EXAMINATION: CT OF THE RIGHT FEMUR WITH CONTRAST 11/23/2022 10:13 pm TECHNIQUE: CT of the right femur was performed with the administration of intravenous contrast.  Multiplanar reformatted images are provided for review.  Automated exposure control, iterative reconstruction, and/or weight based adjustment of the mA/kV was utilized to reduce the radiation dose to as low as reasonably achievable. COMPARISON: Radiographs today HISTORY ORDERING SYSTEM PROVIDED HISTORY: pre op planning TECHNOLOGIST PROVIDED HISTORY: Reason for exam:->pre op planning Decision Support Exception - unselect if not a suspected or confirmed emergency medical condition->Emergency Medical Condition (MA) What reading provider will be dictating this exam?->CRC FINDINGS: Bones: Displaced supracondylar femoral fracture is present, comminuted, some impaction and angulation present. The adjacent knee replacement causes artifact to limit evaluation. Fracture appears to reach the upper margin of the lateral component of the femoral implant. Bone mineral density appears low. Soft Tissue: Soft tissue swelling is present in the deep tissues including hemorrhage to the mid shaft level from the knee. There prominent atherosclerotic calcifications Dependent bladder calculus is 2.1 cm. Tiny relative calculus of each side of the bladder also present could represent calculi possibility of a wall lesion with calcification cannot be excluded however. Possible constipation, diverticulosis coli. Joint: Hip and knee replacements. 1. Supracondylar region femoral fracture with displacement, immediately cephalad to the knee replacement. 2. Further details and incidental findings are described above. US RETROPERITONEAL COMPLETE    Result Date: 12/2/2022  EXAMINATION: RETROPERITONEAL ULTRASOUND OF THE KIDNEYS AND URINARY BLADDER 12/2/2022 COMPARISON: None HISTORY: ORDERING SYSTEM PROVIDED HISTORY: stacy TECHNOLOGIST PROVIDED HISTORY: Reason for exam:->stacy What reading provider will be dictating this exam?->CRC FINDINGS: Kidneys: The right kidney demonstrates unremarkable echogenicity and unremarkable vascularity. No evidence of right hydronephrosis is seen. A cyst is visualized measuring 1.4 x 1.2 x 1.6 cm. No evidence of solid right renal mass is seen.   No evidence of hyperechoic stones is seen. The right kidney measures 11.5 x 5.7 x 5.7 cm. The right renal cortex measures 1.0 cm. The left kidney demonstrates unremarkable echogenicity and unremarkable vascularity. No evidence of left hydronephrosis is seen. No evidence of solid left renal mass is seen. No evidence of hyperechoic stones is seen. The left kidney measures 10.9 x 4.5 x 7.2 cm. The left renal cortex measures 1.0 cm. No evidence of free fluid is seen. Bladder: Unremarkable appearance of the bladder. Prevoid urinary bladder volume measures 339.5 cc. Ureteral jets were not visualized. A hyperechoic stone is visualized in the dependent portion of the urinary bladder measuring 2.7 cm. No evidence of hydronephrosis is seen. 2.7 cm hyperechoic stone visualized within the urinary bladder. FLUORO FOR SURGICAL PROCEDURES    Result Date: 11/25/2022  EXAMINATION: SPOT FLUOROSCOPIC IMAGES 11/25/2022 12:12 pm TECHNIQUE: Fluoroscopy was provided by the radiology department for procedure. Radiologist was not present during examination. FLUOROSCOPY DOSE AND TYPE OR TIME AND EXPOSURES: Fluoroscopy time equals 86.5 seconds. Total dose equals 18.13 mGy COMPARISON: None HISTORY: ORDERING SYSTEM PROVIDED HISTORY: ORIF rt.femur TECHNOLOGIST PROVIDED HISTORY: Reason for exam:->ORIF rt.femur What reading provider will be dictating this exam?->CRC Intraprocedural imaging. FINDINGS: 8 spot images of the femur were obtained. Intraprocedural fluoroscopic spot images as above. See separate procedure report for more information. EKG: normal sinus rhythm, 1st degree AV block.     Resident's Assessment and Plan     The patient is a 76 y.o. male with past medical history significant for remote history of stroke with residual right-sided weakness, hypertension, depression, osteoporosis, right hip replacement who is status post open reduction and internal fixation (ORIF) of right periprosthetic distal femoral fracture after he was transferred from [facility] complaining of right knee pain after a fall from a standing height for definitive care. RRT was called for altered mental state.     Neurologic  Acute metabolic encephalopathy 2/2 urosepsis vs uremia r/o delirium  RRT called for worsening AMS which is likely multifactorial from his UTI, uremia during elevated BUN in the background CKD or possible delirium giving age/recent major surgery/psych medications  AMS improved while admitted to MICU  Continue to monitor mentation  Started on empiric Merrem and vancomycin  Follow septic work-up    Cardiovascular  Shock likely septic   Episode of fever spike, T-max 101 °F  WBC count elevated 17>15, procalcitonin 26  Patient became hypotensive  NICOM not fluid responsive  Initially started on Levophed but now weaned off  Continue to monitor, MAP goal>65    Hypertension  Was hypotensive in the floor  On Coreg 12.5 mg at home  Hold for now given concerns of shock    Pulmonary  Possible aspiration pneumonitis  Noted history of choking on food yesterday  Chest x-ray stable  On antibiotic coverage, continue    Gastrointestinal  Mild transaminitis with alcoholic pattern  , ALT 77,    Monitor hepatic function    Cholelithiasis on imaging  An incidental finding, no gallbladder wall thickening or pericholecystic fluid    Mild to moderate oropharyngeal dysphagia  Follow SLP recs    Infectious Disease  E. coli urinary tract infection 2/2 urinary bladder stone  Urine culture showed E. coli  US retroperitoneal showed 2.7 cm hyperechoic stone visualized within the urinary bladder  Continue Merrem/vancomycin  ID consulted by primary team    Genitourinary/Renal  Acute metabolic acidosis with compensation  Initial ABG 7.32/34.1/109.1/17.3  Bicarb on BMP 17>16  Mentation improved  Monitor BMP every 12  Strict I&O's    CARRINGTON on CKD  Cr 5.3>5.4 uptrending (baseline unknown)  Nephrology consulted by primary team  LR started, continue  Monitor BMP every 12 hours  Obtain urine studies    New onset hematuria likely hemorrhagic cystitis from UTI  Continue to hold Lovenox  Urology consulted by primary team  Consider irrigation if persistent    Hematology/Oncology  Acute on chronic anemia  Hgb 10 dropped to 8.5, concerning for acute blood loss given hematuria  Hold all anticoagulation/chemical DVT prophylaxis  Monitor CBC every 12 hours    Dermatologic/Musculoskeletal  Mechanical fall s/p distal femoral ORIF 11/25/2022  Ensure wound dressing/wound care of surgical site  Ensure adequate analgesics    Osteoporosis  Further evaluation as outpatient    Other  Depression  Continue lamotrigine    PT/OT evaluation: continue order  DVT prophylaxis/ GI prophylaxis: mechanical/protonix  Disposition: admit MICU    Kelly Dover MD, MPH PGY-1   Attending physician: Dr. Parisa Cisneros [facility] [address]   Department of Pulmonary, Critical Care and Sleep Medicine  [address] [facility]  Department of Internal Medicine      During multidisciplinary team rounds Cecilia Peterson is a 76 y.o. male was seen, examined and discussed. This is confirmation that I have personally seen and examined the patient and that the key elements of the encounter were performed by me (> 85 % time). The medications & laboratory data was discussed and adjusted where necessary. The radiographic images were reviewed or with radiologist or consultant if felt dis-concordant with the exam or history. The above findings were corroborated, plans confirmed and changes made if needed. Family is updated at the bedside as available. Key issues of the case were discussed among consultants. Critical Care time is documented if appropriate. Patient seen and examined at time of RRT. Encephalopathic. Transferred to MICU. Concern for possible aspiration  Altered mental status may be due to polypharmacy in setting of CARRINGTON.    Will closely monitor in ICU with possible need for intubation.     Critical Care time: 55 minutes       Emeli Manley DO

## 2022-12-03 NOTE — PROGRESS NOTES
12/03/22 1239   Treatment   Treatment Type N   $Treatment Type $Inhaled Therapy/Meds   Medications Albuterol/Ipratropium   Pre-Tx Pulse 84   Pre-Tx Resps 20   Breath Sounds Pre-Tx DIEGO Crackles   Breath Sounds Pre-Tx RUL Crackles   Breath Sounds Post-Tx DIEGO Crackles   Breath Sounds Post-Tx RUL Crackles   Delivery Source Oxygen;Mask   Oxygen Therapy/Pulse Ox   SpO2 98 %   Patient was given sodium chloride 3% and duoNEB Drs orders during RRT

## 2022-12-03 NOTE — PROGRESS NOTES
[facility] Roberto A. Elinor Dakin, MD Beatrice Levans, MD Ben Hemp, MD  Consultation  Patient's Name: Claudene Share  6:53 PM  12/3/2022    12/2: Seen in his room confused (what is his baseline mental status ) RN is telling he aspirated this am and he is now NPO Per RN no diarrhea Clinicaly not doing well     12/3: Mental status continues to decline, hypotensive. Transferred to ICU. Received IV fluid bolus, started on nor epi. Awake, alert, interactive, though has considerable short-term memory deficit, difficulty answering questions, disoriented clinical scenario. Daughter at bedside --notes no history of renal disease. History of Present Ilness:      75 y/o M presenting with R knee pain   Pt has hx of HTN OA CKD   Pt is being admitted with diangosis of periprosthetic distal femur fracture   Pt seen in his room he is a poor historian , he is on RA he appear euvolemic   Pt presented with cr of 2 . Cr up to 3.2   When asking pt does he have kidney disease he ll I think so . When asking him do you follow with a kidney speciliast his response (on multiple tries ) was I do no think so . No family history on file. reports that he has an unknown smoking status.  He has never used smokeless tobacco.    Allergies:  Ceftriaxone    Current Medications:    ipratropium-albuterol (DUONEB) nebulizer solution 1 ampule, Q4H WA  norepinephrine (LEVOPHED) 16 mg in dextrose 5% 250 mL infusion, Continuous  lactated ringers infusion, Continuous  hydrALAZINE (APRESOLINE) injection 5 mg, Q6H PRN  levoFLOXacin (LEVAQUIN) 500 MG/100ML infusion 500 mg, Q48H  trimethobenzamide (TIGAN) injection 200 mg, Q6H PRN  [Held by provider] gabapentin (NEURONTIN) capsule 100 mg, TID  [Held by provider] enoxaparin Sodium (LOVENOX) injection 30 mg, Daily  [Held by provider] methocarbamol (ROBAXIN) tablet 750 mg, 4x Daily  benzocaine-menthol (CEPACOL SORE THROAT) lozenge 1 lozenge, Q2H PRN  morphine (PF) injection 2 mg, Q2H PRN   Or  morphine sulfate (PF) injection 4 mg, Q2H PRN  oxyCODONE (ROXICODONE) immediate release tablet 5 mg, Q4H PRN   Or  oxyCODONE HCl (OXY-IR) immediate release tablet 10 mg, Q4H PRN  [Held by provider] carvedilol (COREG) tablet 12.5 mg, Daily  [Held by provider] furosemide (LASIX) tablet 20 mg, Daily  lamoTRIgine (LAMICTAL) tablet 200 mg, Daily  [Held by provider] lisinopril (PRINIVIL;ZESTRIL) tablet 5 mg, Daily  pantoprazole (PROTONIX) tablet 40 mg, QAM AC  [Held by provider] venlafaxine (EFFEXOR) tablet 300 mg, Daily  trospium (SANCTURA) tablet 20 mg, Nightly  sodium chloride flush 0.9 % injection 10 mL, 2 times per day  sodium chloride flush 0.9 % injection 10 mL, PRN  0.9 % sodium chloride infusion, PRN  polyethylene glycol (GLYCOLAX) packet 17 g, Daily PRN  acetaminophen (TYLENOL) tablet 650 mg, Q6H PRN   Or  acetaminophen (TYLENOL) suppository 650 mg, Q6H PRN  potassium chloride (KLOR-CON M) extended release tablet 20 mEq, BID WC      Review of Systems:   Constitutional: no fevers , no chills , feels ok   Eyes: no eye pain , no itching , no drainage  Ears, nose, mouth, throat, and face: no ear ,nose pain , hearing is ok ,no nasal drainage   Respiratory: no sob ,no cough ,no wheezing . Cardiovascular: no chest pain , no palpitation ,no sob . Gastrointestinal: no nausea, vomiting , constipation , no abdominal pain . Genitourinary:no urinary retention , no burning , dysuria . No polyuria   Hematologic/lymphatic: no bleeding , no cougulation issues . Musculoskeletal:no joint pain , no swelling . Neurological: no headaches ,no weakness , no numbness . Endocrine: no thirst , no weight issues . Physical exam:   Vital signs BP (!) 125/58   Pulse 70   Temp 99 °F (37.2 °C) (Axillary)   Resp 22   Ht 6' 2\" (1.88 m)   Wt 212 lb (96.2 kg)   SpO2 98%   BMI 27.22 kg/m²   Gen : NAD , appropriate for stated age . Head : at , nc   Neck : supple , no thyromegaly noted .    Eyes : EOMI , PERRLA   CV : RRR , No M/R/G . Lungs: CTAB , no wheezing , good flow heard b/l   Abd : soft , NT , BS + , No Organomegaly appreciated . Skin : soft, dry . Neuro : CN  II-XII grossly intact , no focal neurologic deficit . Psych : cooperative .      Data:   Labs:  CBC with Differential:    Lab Results   Component Value Date/Time    WBC 14.7 12/03/2022 01:02 PM    RBC 3.07 12/03/2022 01:02 PM    HGB 8.5 12/03/2022 01:02 PM    HCT 27.4 12/03/2022 01:02 PM     12/03/2022 01:02 PM    MCV 89.3 12/03/2022 01:02 PM    MCH 27.7 12/03/2022 01:02 PM    MCHC 31.0 12/03/2022 01:02 PM    RDW 20.2 12/03/2022 01:02 PM    NRBC 0.9 12/03/2022 01:02 PM    METASPCT 0.9 12/03/2022 01:02 PM    LYMPHOPCT 11.3 12/03/2022 01:02 PM    MONOPCT 6.1 12/03/2022 01:02 PM    MYELOPCT 0.9 12/03/2022 11:35 AM    BASOPCT 0.4 12/03/2022 01:02 PM    MONOSABS 0.88 12/03/2022 01:02 PM    LYMPHSABS 1.62 12/03/2022 01:02 PM    EOSABS 0.00 12/03/2022 01:02 PM    BASOSABS 0.00 12/03/2022 01:02 PM     CMP:    Lab Results   Component Value Date/Time     12/03/2022 04:01 PM     12/03/2022 04:01 PM    K 4.4 12/03/2022 04:01 PM    K 4.4 12/03/2022 04:01 PM    K 4.4 12/03/2022 11:50 AM     12/03/2022 04:01 PM     12/03/2022 04:01 PM    CO2 16 12/03/2022 04:01 PM    CO2 16 12/03/2022 04:01 PM    BUN 69 12/03/2022 04:01 PM    BUN 68 12/03/2022 04:01 PM    CREATININE 5.4 12/03/2022 04:01 PM    CREATININE 5.4 12/03/2022 04:01 PM    LABGLOM 10 12/03/2022 04:01 PM    LABGLOM 10 12/03/2022 04:01 PM    GLUCOSE 114 12/03/2022 04:01 PM    GLUCOSE 115 12/03/2022 04:01 PM    PROT 6.2 12/03/2022 11:50 AM    LABALBU 2.2 12/03/2022 11:50 AM    CALCIUM 8.8 12/03/2022 04:01 PM    CALCIUM 8.8 12/03/2022 04:01 PM    BILITOT 0.7 12/03/2022 11:50 AM    ALKPHOS 152 12/03/2022 11:50 AM     12/03/2022 11:50 AM    ALT 77 12/03/2022 11:50 AM     Ionized Calcium:  No results found for: IONCA  Magnesium:    Lab Results   Component Value Date/Time MG 2.0 12/03/2022 04:01 PM     Phosphorus:    Lab Results   Component Value Date/Time    PHOS 3.5 12/03/2022 04:01 PM     U/A:    Lab Results   Component Value Date/Time    COLORU BLOODY 12/03/2022 04:01 PM    PHUR 6.5 12/03/2022 04:01 PM    WBCUA >20 12/03/2022 04:01 PM    RBCUA PACKED 12/03/2022 04:01 PM    BACTERIA MANY 12/03/2022 04:01 PM    CLARITYU Clear 12/03/2022 04:01 PM    SPECGRAV 1.020 12/03/2022 04:01 PM    LEUKOCYTESUR MODERATE 12/03/2022 04:01 PM    UROBILINOGEN 2.0 12/03/2022 04:01 PM    BILIRUBINUR MODERATE 12/03/2022 04:01 PM    BLOODU LARGE 12/03/2022 04:01 PM    GLUCOSEU 100 12/03/2022 04:01 PM     Microalbumen/Creatinine ratio:  No components found for: RUCREAT  Iron Saturation:  No components found for: PERCENTFE  TIBC:  No results found for: TIBC  FERRITIN:  No results found for: FERRITIN     Imaging:  XR ABDOMEN (KUB) (SINGLE AP VIEW)   Final Result   Gaseous distended and dilated colonic segments concerning for ileus pattern. Paucity of air in the small bowel. XR CHEST PORTABLE   Final Result   No acute cardiopulmonary process         CT HEAD WO CONTRAST   Final Result   No acute intracranial abnormality. Severely advanced chronic small vessel ischemic disease along with cerebral   atrophy noted         CT ABDOMEN PELVIS WO CONTRAST Additional Contrast? 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 Cholelithiasis without gallbladder wall thickening or pericholecystic fluid         XR CHEST PORTABLE   Final Result   Chronic findings. No acute cardiopulmonary disease.          US RETROPERITONEAL COMPLETE   Final Result   No evidence of hydronephrosis is seen. 2.7 cm hyperechoic stone visualized within the urinary bladder. XR FEMUR RIGHT (MIN 2 VIEWS)   Final Result   Status post right femoral ORIF with improved anatomic alignment spanning the   right distal femoral fracture with expected postsurgical changes in the soft   tissues         FLUORO FOR SURGICAL PROCEDURES   Final Result   Intraprocedural fluoroscopic spot images as above. See separate procedure   report for more information. XR CHEST 1 VIEW   Final Result   No acute cardiopulmonary process. CT FEMUR RIGHT WO CONTRAST   Final Result   1. Supracondylar region femoral fracture with displacement, immediately   cephalad to the knee replacement. 2. Further details and incidental findings are described above. XR KNEE RIGHT (1-2 VIEWS)   Final Result   Angulated and impacted supracondylar fracture. XR FEMUR RIGHT (MIN 2 VIEWS)   Final Result   Angulated and impacted supracondylar fracture. Assessment    -Acute kidney injury on presentation with though has gotten steadily worse since his arrival.  Etiology is likely hemodynamically mediated. Hard to differentiate proteinuria from acute GN from that associated with measurement of WBCs in his urine specimen. Serologies have been ordered so far negative to date.    -chronic kidney disease stage III (presumptive, baseline cr unknown, though his daughter notes \"no known history of kidney disease. \")     -Femur neck fracture     -Hypertension -- not an issue currently, now hypotensive. -UTI     -Microscopic hematuria     -Acute mental status change. Daughter notes that at baseline he is \"quite sharp,\" and does not have any cognitive deficits    -UTI, on Levaquin    Azotemia progressing. Nonoliguric.   Hypernatremic due to poor intake, increased insensible losses  Hypotensive, transferred to the ICU  Question of aspiration yesterday    Ultrasound of the kidney unremarkable except for parenchymal stone, nonobstructing      Recommendations  -Continue IV fluid resuscitation, changed to LR, run at 150 cc/h after current bolus is complete  -Hold ACE inhibitor's, Lasix for now  -Await remaining serologies  -Continue intensive supportive care  -Continue Fuentes catheter  -Follow labs, UO  -Acute hemodialysis not warranted at this point, though if azotemia progresses may need to consider in the next 1 to 3 days  -Continue empiric treatment of UTI    Long discussion with daughter at bedside        Electronically signed by Mellissa Esparza MD on 12/3/2022

## 2022-12-04 LAB
ALBUMIN SERPL-MCNC: 1.8 G/DL (ref 3.5–5.2)
ALP BLD-CCNC: 115 U/L (ref 40–129)
ALT SERPL-CCNC: 54 U/L (ref 0–40)
ANION GAP SERPL CALCULATED.3IONS-SCNC: 14 MMOL/L (ref 7–16)
ANISOCYTOSIS: ABNORMAL
AST SERPL-CCNC: 129 U/L (ref 0–39)
BASOPHILS ABSOLUTE: 0 E9/L (ref 0–0.2)
BASOPHILS RELATIVE PERCENT: 0.4 % (ref 0–2)
BILIRUB SERPL-MCNC: 0.4 MG/DL (ref 0–1.2)
BUN BLDV-MCNC: 70 MG/DL (ref 6–23)
BURR CELLS: ABNORMAL
CALCIUM SERPL-MCNC: 8.8 MG/DL (ref 8.6–10.2)
CHLORIDE BLD-SCNC: 116 MMOL/L (ref 98–107)
CO2: 15 MMOL/L (ref 22–29)
CREAT SERPL-MCNC: 4.8 MG/DL (ref 0.7–1.2)
EKG ATRIAL RATE: 86 BPM
EKG P AXIS: 67 DEGREES
EKG P-R INTERVAL: 152 MS
EKG Q-T INTERVAL: 370 MS
EKG QRS DURATION: 94 MS
EKG QTC CALCULATION (BAZETT): 442 MS
EKG R AXIS: 38 DEGREES
EKG T AXIS: 89 DEGREES
EKG VENTRICULAR RATE: 86 BPM
EOSINOPHILS ABSOLUTE: 0.14 E9/L (ref 0.05–0.5)
EOSINOPHILS RELATIVE PERCENT: 0.9 % (ref 0–6)
GFR SERPL CREATININE-BSD FRML MDRD: 12 ML/MIN/1.73
GLUCOSE BLD-MCNC: 166 MG/DL (ref 74–99)
HCT VFR BLD CALC: 27.7 % (ref 37–54)
HEMOGLOBIN: 8.5 G/DL (ref 12.5–16.5)
LYMPHOCYTES ABSOLUTE: 1.2 E9/L (ref 1.5–4)
LYMPHOCYTES RELATIVE PERCENT: 7.8 % (ref 20–42)
MAGNESIUM: 1.8 MG/DL (ref 1.6–2.6)
MCH RBC QN AUTO: 27.9 PG (ref 26–35)
MCHC RBC AUTO-ENTMCNC: 30.7 % (ref 32–34.5)
MCV RBC AUTO: 90.8 FL (ref 80–99.9)
METAMYELOCYTES RELATIVE PERCENT: 0.9 % (ref 0–1)
MONOCYTES ABSOLUTE: 0.75 E9/L (ref 0.1–0.95)
MONOCYTES RELATIVE PERCENT: 5.2 % (ref 2–12)
NEUTROPHILS ABSOLUTE: 12.9 E9/L (ref 1.8–7.3)
NEUTROPHILS RELATIVE PERCENT: 85.2 % (ref 43–80)
OVALOCYTES: ABNORMAL
PDW BLD-RTO: 20.3 FL (ref 11.5–15)
PHOSPHORUS: 3.4 MG/DL (ref 2.5–4.5)
PLATELET # BLD: 158 E9/L (ref 130–450)
PMV BLD AUTO: 11.1 FL (ref 7–12)
POIKILOCYTES: ABNORMAL
POLYCHROMASIA: ABNORMAL
POTASSIUM REFLEX MAGNESIUM: 4.1 MMOL/L (ref 3.5–5)
RBC # BLD: 3.05 E12/L (ref 3.8–5.8)
REASON FOR REJECTION: NORMAL
REJECTED TEST: NORMAL
SODIUM BLD-SCNC: 145 MMOL/L (ref 132–146)
TOTAL PROTEIN: 5.5 G/DL (ref 6.4–8.3)
TOXIC GRANULATION: ABNORMAL
WBC # BLD: 15 E9/L (ref 4.5–11.5)

## 2022-12-04 PROCEDURE — 2580000003 HC RX 258: Performed by: INTERNAL MEDICINE

## 2022-12-04 PROCEDURE — 83735 ASSAY OF MAGNESIUM: CPT

## 2022-12-04 PROCEDURE — 93010 ELECTROCARDIOGRAM REPORT: CPT | Performed by: INTERNAL MEDICINE

## 2022-12-04 PROCEDURE — 85025 COMPLETE CBC W/AUTO DIFF WBC: CPT

## 2022-12-04 PROCEDURE — 1200000000 HC SEMI PRIVATE

## 2022-12-04 PROCEDURE — 6360000002 HC RX W HCPCS

## 2022-12-04 PROCEDURE — 99222 1ST HOSP IP/OBS MODERATE 55: CPT | Performed by: NURSE PRACTITIONER

## 2022-12-04 PROCEDURE — 6360000002 HC RX W HCPCS: Performed by: PHYSICAL THERAPY ASSISTANT

## 2022-12-04 PROCEDURE — 87081 CULTURE SCREEN ONLY: CPT

## 2022-12-04 PROCEDURE — 6360000002 HC RX W HCPCS: Performed by: INTERNAL MEDICINE

## 2022-12-04 PROCEDURE — 6370000000 HC RX 637 (ALT 250 FOR IP): Performed by: FAMILY MEDICINE

## 2022-12-04 PROCEDURE — 84100 ASSAY OF PHOSPHORUS: CPT

## 2022-12-04 PROCEDURE — 51701 INSERT BLADDER CATHETER: CPT

## 2022-12-04 PROCEDURE — 6370000000 HC RX 637 (ALT 250 FOR IP): Performed by: PHYSICAL THERAPY ASSISTANT

## 2022-12-04 PROCEDURE — 51798 US URINE CAPACITY MEASURE: CPT

## 2022-12-04 PROCEDURE — 36415 COLL VENOUS BLD VENIPUNCTURE: CPT

## 2022-12-04 PROCEDURE — 2700000000 HC OXYGEN THERAPY PER DAY

## 2022-12-04 PROCEDURE — 51702 INSERT TEMP BLADDER CATH: CPT

## 2022-12-04 PROCEDURE — 80048 BASIC METABOLIC PNL TOTAL CA: CPT

## 2022-12-04 PROCEDURE — 6360000002 HC RX W HCPCS: Performed by: NURSE PRACTITIONER

## 2022-12-04 PROCEDURE — 80053 COMPREHEN METABOLIC PANEL: CPT

## 2022-12-04 PROCEDURE — 94640 AIRWAY INHALATION TREATMENT: CPT

## 2022-12-04 RX ORDER — MAGNESIUM SULFATE IN WATER 40 MG/ML
2000 INJECTION, SOLUTION INTRAVENOUS ONCE
Status: COMPLETED | OUTPATIENT
Start: 2022-12-04 | End: 2022-12-04

## 2022-12-04 RX ORDER — LORAZEPAM 2 MG/ML
0.5 INJECTION INTRAMUSCULAR EVERY 6 HOURS PRN
Status: DISCONTINUED | OUTPATIENT
Start: 2022-12-04 | End: 2022-12-05 | Stop reason: HOSPADM

## 2022-12-04 RX ADMIN — VANCOMYCIN HYDROCHLORIDE 500 MG: 500 INJECTION, POWDER, LYOPHILIZED, FOR SOLUTION INTRAVENOUS at 10:20

## 2022-12-04 RX ADMIN — LORAZEPAM 0.5 MG: 2 INJECTION INTRAMUSCULAR; INTRAVENOUS at 21:38

## 2022-12-04 RX ADMIN — MORPHINE SULFATE 2 MG: 2 INJECTION, SOLUTION INTRAMUSCULAR; INTRAVENOUS at 10:32

## 2022-12-04 RX ADMIN — HYDROMORPHONE HYDROCHLORIDE 0.5 MG: 1 INJECTION, SOLUTION INTRAMUSCULAR; INTRAVENOUS; SUBCUTANEOUS at 19:00

## 2022-12-04 RX ADMIN — MORPHINE SULFATE 4 MG: 4 INJECTION, SOLUTION INTRAMUSCULAR; INTRAVENOUS at 01:01

## 2022-12-04 RX ADMIN — IPRATROPIUM BROMIDE AND ALBUTEROL SULFATE 1 AMPULE: 2.5; .5 SOLUTION RESPIRATORY (INHALATION) at 08:55

## 2022-12-04 RX ADMIN — DEXTROSE MONOHYDRATE: 50 INJECTION, SOLUTION INTRAVENOUS at 06:35

## 2022-12-04 RX ADMIN — MAGNESIUM SULFATE HEPTAHYDRATE 2000 MG: 40 INJECTION, SOLUTION INTRAVENOUS at 06:51

## 2022-12-04 RX ADMIN — PANTOPRAZOLE SODIUM 40 MG: 40 TABLET, DELAYED RELEASE ORAL at 06:55

## 2022-12-04 RX ADMIN — LAMOTRIGINE 200 MG: 100 TABLET ORAL at 09:25

## 2022-12-04 RX ADMIN — LORAZEPAM 0.5 MG: 2 INJECTION INTRAMUSCULAR; INTRAVENOUS at 14:28

## 2022-12-04 RX ADMIN — HYDROMORPHONE HYDROCHLORIDE 0.5 MG: 1 INJECTION, SOLUTION INTRAMUSCULAR; INTRAVENOUS; SUBCUTANEOUS at 14:18

## 2022-12-04 ASSESSMENT — PAIN SCALES - WONG BAKER
WONGBAKER_NUMERICALRESPONSE: 0

## 2022-12-04 ASSESSMENT — PAIN DESCRIPTION - ONSET
ONSET: ON-GOING

## 2022-12-04 ASSESSMENT — PAIN DESCRIPTION - DESCRIPTORS
DESCRIPTORS: ACHING
DESCRIPTORS: ACHING
DESCRIPTORS: STABBING
DESCRIPTORS: ACHING
DESCRIPTORS: ACHING

## 2022-12-04 ASSESSMENT — PAIN DESCRIPTION - ORIENTATION
ORIENTATION: MID;LOWER
ORIENTATION: MID

## 2022-12-04 ASSESSMENT — PAIN - FUNCTIONAL ASSESSMENT
PAIN_FUNCTIONAL_ASSESSMENT: PREVENTS OR INTERFERES SOME ACTIVE ACTIVITIES AND ADLS
PAIN_FUNCTIONAL_ASSESSMENT: PREVENTS OR INTERFERES WITH MANY ACTIVE NOT PASSIVE ACTIVITIES
PAIN_FUNCTIONAL_ASSESSMENT: PREVENTS OR INTERFERES SOME ACTIVE ACTIVITIES AND ADLS

## 2022-12-04 ASSESSMENT — PAIN SCALES - GENERAL
PAINLEVEL_OUTOF10: 0
PAINLEVEL_OUTOF10: 7
PAINLEVEL_OUTOF10: 0
PAINLEVEL_OUTOF10: 8
PAINLEVEL_OUTOF10: 0
PAINLEVEL_OUTOF10: 6

## 2022-12-04 ASSESSMENT — PAIN DESCRIPTION - PAIN TYPE: TYPE: SURGICAL PAIN;ACUTE PAIN

## 2022-12-04 ASSESSMENT — PAIN DESCRIPTION - LOCATION
LOCATION: ABDOMEN
LOCATION: SCROTUM

## 2022-12-04 ASSESSMENT — PAIN DESCRIPTION - FREQUENCY
FREQUENCY: INTERMITTENT

## 2022-12-04 NOTE — PLAN OF CARE
Problem: Discharge Planning  Goal: Discharge to home or other facility with appropriate resources  12/4/2022 0737 by Glendy Chaudhary RN  Outcome: Progressing  Flowsheets (Taken 12/4/2022 0730)  Discharge to home or other facility with appropriate resources: Identify barriers to discharge with patient and caregiver  12/3/2022 2209 by Saray Nowak RN  Outcome: Progressing     Problem: Pain  Goal: Verbalizes/displays adequate comfort level or baseline comfort level  12/4/2022 0737 by Glendy Chaudhary RN  Outcome: Progressing  Flowsheets (Taken 12/4/2022 0200 by Saray Nowak, GILMAR)  Verbalizes/displays adequate comfort level or baseline comfort level: Encourage patient to monitor pain and request assistance  12/3/2022 2209 by Saray Nowak RN  Outcome: Progressing     Problem: Safety - Adult  Goal: Free from fall injury  12/4/2022 0737 by Glendy Chaudhary RN  Outcome: Progressing  12/3/2022 2209 by Saray Nowak RN  Outcome: Progressing     Problem: ABCDS Injury Assessment  Goal: Absence of physical injury  12/4/2022 0737 by Glendy Chaudhary RN  Outcome: Progressing  12/3/2022 2209 by Saray Nowak RN  Outcome: Progressing     Problem: Skin/Tissue Integrity  Goal: Absence of new skin breakdown  Description: 1. Monitor for areas of redness and/or skin breakdown  2. Assess vascular access sites hourly  3. Every 4-6 hours minimum:  Change oxygen saturation probe site  4. Every 4-6 hours:  If on nasal continuous positive airway pressure, respiratory therapy assess nares and determine need for appliance change or resting period.   12/4/2022 0737 by Glendy Chaudhary RN  Outcome: Progressing  12/3/2022 2209 by Saray Nowak RN  Outcome: Progressing  12/3/2022 2033 by Nikki Barlow RN  Outcome: Progressing     Problem: Nutrition Deficit:  Goal: Optimize nutritional status  12/4/2022 0737 by Glendy Chaudhary RN  Outcome: Progressing  12/3/2022 2209 by Saray Nowak RN  Outcome: Progressing     Problem: Safety - Medical Restraint  Goal: Remains free of injury from restraints (Restraint for Interference with Medical Device)  Description: INTERVENTIONS:  1. Determine that other, less restrictive measures have been tried or would not be effective before applying the restraint  2. Evaluate the patient's condition at the time of restraint application  3. Inform patient/family regarding the reason for restraint  4.  Q2H: Monitor safety, psychosocial status, comfort, nutrition and hydration  Outcome: Progressing  Flowsheets  Taken 12/4/2022 0735 by Glendy Chaudhary RN  Remains free of injury from restraints (restraint for interference with medical device): Determine that other, less restrictive measures have been tried or would not be effective before applying the restraint  Taken 12/4/2022 0600 by Saray Nowak RN  Remains free of injury from restraints (restraint for interference with medical device):   Determine that other, less restrictive measures have been tried or would not be effective before applying the restraint   Every 2 hours: Monitor safety, psychosocial status, comfort, nutrition and hydration  Taken 12/4/2022 0400 by Saray Nowak RN  Remains free of injury from restraints (restraint for interference with medical device):   Determine that other, less restrictive measures have been tried or would not be effective before applying the restraint   Every 2 hours: Monitor safety, psychosocial status, comfort, nutrition and hydration  Taken 12/4/2022 0200 by Saray Nowak RN  Remains free of injury from restraints (restraint for interference with medical device):   Determine that other, less restrictive measures have been tried or would not be effective before applying the restraint   Every 2 hours: Monitor safety, psychosocial status, comfort, nutrition and hydration  Taken 12/4/2022 0000 by Saray Nowak RN  Remains free of injury from restraints (restraint for interference with medical device):   Determine that other, less restrictive measures have been tried or would not be effective before applying the restraint   Every 2 hours: Monitor safety, psychosocial status, comfort, nutrition and hydration

## 2022-12-04 NOTE — CONSULTS
INPATIENT CONSULTATION RECORD FOR  12/4/2022      [facility]  [address] [place], [address]  [phone]        REASON FOR CONSULTATION:      Hematuria, UTI    HISTORY OF PRESENT ILLNESS:      The patient is a 76 y.o. male patient who presents with Hematuria. Pt is admitted to the ICU with renal failure. He did have a fall at home. And had Distal femur fracutre. He does have renal failur. Parham has been placed and his urine is yellow today. He did have some hematuria per ICU nursing but this has resolved. He has been seen in the past by Dr. Sagar Camara. He had an Interstim placed which the family states never helped his urinary symptoms. His CT looks like there could be emphysematous cystitis but this could be due to irrigations of the parham catheter and introducing air into the bladder. No fevers. They are collecting his urine now for 24 hour testing for nephrology. History reviewed. No pertinent past medical history.       Past Surgical History:   Procedure Laterality Date    FEMUR FRACTURE SURGERY Right 11/25/2022    RIGHT PERIPROSTHETIC DISTAL FEMUR OPEN REDUCTION INTERNAL FIXATION performed by Emanuel Uribe DO at [facility] OR       Medications Prior to Admission:    Medications Prior to Admission: carvedilol (COREG) 12.5 MG tablet, Take 12.5 mg by mouth daily  omeprazole (PRILOSEC) 20 MG delayed release capsule, Take 20 mg by mouth daily  venlafaxine (EFFEXOR) 50 MG tablet, Take 300 mg by mouth daily  lisinopril (PRINIVIL;ZESTRIL) 5 MG tablet, Take 5 mg by mouth daily  furosemide (LASIX) 20 MG tablet, Take 20 mg by mouth daily  potassium chloride (KLOR-CON M) 10 MEQ extended release tablet, Take 10 mEq by mouth daily  sulfamethoxazole-trimethoprim (BACTRIM DS;SEPTRA DS) 800-160 MG per tablet, Take 1 tablet by mouth 2 times daily End date 11-23-22  Vibegron 75 MG TABS, Take 75 mg by mouth daily  lamoTRIgine (LAMICTAL) 200 MG tablet, Take 200 mg by mouth daily  [DISCONTINUED] acetaminophen (TYLENOL) 325 MG tablet, Take 650 mg by mouth every 4 hours as needed for Pain  [DISCONTINUED] aspirin 325 MG tablet, Take 325 mg by mouth daily  [DISCONTINUED] oxyCODONE-acetaminophen (PERCOCET) 5-325 MG per tablet, Take 1 tablet by mouth every 6 hours as needed for Pain. Allergies:    Ceftriaxone    Social History:    reports that he has an unknown smoking status. He has never used smokeless tobacco.    Family History:   Non-contributory to this Urological problem  family history is not on file. REVIEW OF SYSTEMS:  Respiratory: negative for cough and hemoptysis  Cardiovascular: negative for chest pain and dyspnea  Gastrointestinal: negative for abdominal pain, diarrhea, nausea and vomiting  Derm: negative for rash and skin lesion(s)  Neurological: negative for seizures and tremors  Endocrine: negative for diabetic symptoms including polydipsia and polyuria  : As above in the HPI, otherwise negative  All other systems negative    PHYSICAL EXAM:    Vitals:  /68   Pulse 75   Temp 99 °F (37.2 °C) (Bladder)   Resp 19   Ht 6' 2\" (1.88 m)   Wt 250 lb (113.4 kg)   SpO2 99%   BMI 32.10 kg/m²     General:  Awake, alert, oriented X 3. Well developed, well nourished, well groomed. No apparent distress. HEENT:  Normocephalic, atraumatic. Pupils equal, round. No scleral icterus. No conjunctival injection. Normal lips, teeth, and gums. No nasal discharge. Neck:  Supple, no masses. Heart:  RRR  Lungs:  No audible wheezing. Respirations symmetric and non-labored.   Abdomen:  soft, nontender, no masses, no organomegaly, no peritoneal signs  Extremities:  No clubbing, cyanosis, or edema  Skin:  Warm and dry, no open lesions or rashes  Neuro:  Cranial nerves 2-12 intact, no focal deficits  Rectal: deferred  Genitalia:  parham yellow    LABS:    Lab Results   Component Value Date    WBC 15.0 (H) 12/04/2022    HGB 8.5 (L) 12/04/2022    HCT 27.7 (L) 12/04/2022    MCV 90.8 12/04/2022     12/04/2022       Lab Results   Component Value Date    CREATININE 4.8 (H) 12/04/2022       No results found for: PSA    Lab Results   Component Value Date    LABURIN >100,000 CFU/ml 11/30/2022       No results found for: BC    No results found for: BLOODCULT2  Imaging:   Impression   Parham catheter in place within a largely decompressed urinary bladder which   has a bladder calculus or stone in the posterolateral portion along with   severe circumferential wall thickening despite under distension along with   gas locules scattered in the anterior aspect axial images series 304 image   168 through 172 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 Cholelithiasis without gallbladder wall thickening or pericholecystic fluid       ASSESSMENT:   75 y/o with renal failure, Hematuria, Bladder calculu    PLAN:    Hematuria has resolved. Maintain parham for accurate I and O during ICU setting. May need cystolithalopaxy as an outpatient once stabilized. He does have a history of Interstim implant with Dr. Atif Wallis  He may have more of a BPH outlet problem.    Will follow up        Avi Brito MD,   11:03 AM  12/4/2022

## 2022-12-04 NOTE — PLAN OF CARE
Case discussed with patient's family. Decided to withdraw care and CODE STATUS changed to DNR CC. Palliative care consulted.     Electronically signed by Skyler Levine MD on 12/4/2022 at 4:00 PM

## 2022-12-04 NOTE — PLAN OF CARE
Addendum for 7a-1p. Spoke 1-2 word sentences this am.   Maryfrances Fam" that he wanted ice cubes. Small single ones given. Also said \"yes\" and nodded that he likes the apricot juice. Thickened type given. We were able to give other meds also / crushed. However pt w more limited responsiveness as morning progressed. Duoneb txs given. Vss but decision made to call rrt at approx 11:30 given the limited effectiveness of our other measures to incl frequ turning. Fam updated prior to rrt. Nephr informed re what appeared to be incr amt of bloody urine. After tests and tx measures during rrt,   pt sent to room 4525. Wife given ph update again and masha who visited in person given updates.

## 2022-12-04 NOTE — PLAN OF CARE
Problem: Discharge Planning  Goal: Discharge to home or other facility with appropriate resources  Outcome: Progressing     Problem: Pain  Goal: Verbalizes/displays adequate comfort level or baseline comfort level  Outcome: Progressing     Problem: Safety - Adult  Goal: Free from fall injury  Outcome: Progressing     Problem: ABCDS Injury Assessment  Goal: Absence of physical injury  Outcome: Progressing     Problem: Skin/Tissue Integrity  Goal: Absence of new skin breakdown  Description: 1. Monitor for areas of redness and/or skin breakdown  2. Assess vascular access sites hourly  3. Every 4-6 hours minimum:  Change oxygen saturation probe site  4. Every 4-6 hours:  If on nasal continuous positive airway pressure, respiratory therapy assess nares and determine need for appliance change or resting period.   12/3/2022 2209 by Cori Bonilla RN  Outcome: Progressing     Problem: Nutrition Deficit:  Goal: Optimize nutritional status  Outcome: Progressing

## 2022-12-04 NOTE — PLAN OF CARE
Problem: Discharge Planning  Goal: Discharge to home or other facility with appropriate resources  12/4/2022 0813 by Sebas Dickinson RN  Outcome: Progressing  12/4/2022 0737 by Sebas Dickinson RN  Outcome: Progressing  Flowsheets (Taken 12/4/2022 0730)  Discharge to home or other facility with appropriate resources: Identify barriers to discharge with patient and caregiver  12/3/2022 2209 by Yesica Tapia RN  Outcome: Progressing     Problem: Pain  Goal: Verbalizes/displays adequate comfort level or baseline comfort level  12/4/2022 0813 by Sebas Dickinson RN  Outcome: Progressing  12/4/2022 0737 by Sebas Dickinson RN  Outcome: Progressing  Flowsheets (Taken 12/4/2022 0200 by Yesica Tapia RN)  Verbalizes/displays adequate comfort level or baseline comfort level: Encourage patient to monitor pain and request assistance  12/3/2022 2209 by Yesica Tapia RN  Outcome: Progressing     Problem: Safety - Adult  Goal: Free from fall injury  12/4/2022 0813 by Sebas Dickinson RN  Outcome: Progressing  12/4/2022 0737 by Sebas Dickinson RN  Outcome: Progressing  12/3/2022 2209 by Yesica Tapia RN  Outcome: Progressing     Problem: ABCDS Injury Assessment  Goal: Absence of physical injury  12/4/2022 0813 by Sebas Dickinson RN  Outcome: Progressing  12/4/2022 0737 by Sebas Dickinson RN  Outcome: Progressing  12/3/2022 2209 by Yesica Tapia RN  Outcome: Progressing     Problem: Skin/Tissue Integrity  Goal: Absence of new skin breakdown  Description: 1. Monitor for areas of redness and/or skin breakdown  2. Assess vascular access sites hourly  3. Every 4-6 hours minimum:  Change oxygen saturation probe site  4. Every 4-6 hours:  If on nasal continuous positive airway pressure, respiratory therapy assess nares and determine need for appliance change or resting period.   12/4/2022 0813 by Sebas Dickinson RN  Outcome: Progressing  12/4/2022 0737 by Sebas Dickinson RN  Outcome: Progressing  12/3/2022 2209 by Desiree Anderson Erika Noyola RN  Outcome: Progressing  12/3/2022 2033 by Zaida Gaona RN  Outcome: Progressing     Problem: Nutrition Deficit:  Goal: Optimize nutritional status  12/4/2022 0813 by Thanh Zuñiga RN  Outcome: Progressing  12/4/2022 0737 by Thanh Zuñiga RN  Outcome: Progressing  12/3/2022 2209 by Rosaura Best RN  Outcome: Progressing     Problem: Safety - Medical Restraint  Goal: Remains free of injury from restraints (Restraint for Interference with Medical Device)  Description: INTERVENTIONS:  1. Determine that other, less restrictive measures have been tried or would not be effective before applying the restraint  2. Evaluate the patient's condition at the time of restraint application  3. Inform patient/family regarding the reason for restraint  4.  Q2H: Monitor safety, psychosocial status, comfort, nutrition and hydration  12/4/2022 0813 by Thanh Zuñiga RN  Outcome: Progressing  Flowsheets (Taken 12/4/2022 0800)  Remains free of injury from restraints (restraint for interference with medical device): Determine that other, less restrictive measures have been tried or would not be effective before applying the restraint  12/4/2022 0737 by Thanh Zuñiga RN  Outcome: Progressing  Flowsheets  Taken 12/4/2022 0735 by Thanh Zuñiga RN  Remains free of injury from restraints (restraint for interference with medical device): Determine that other, less restrictive measures have been tried or would not be effective before applying the restraint  Taken 12/4/2022 0600 by Rosaura Best RN  Remains free of injury from restraints (restraint for interference with medical device):   Determine that other, less restrictive measures have been tried or would not be effective before applying the restraint   Every 2 hours: Monitor safety, psychosocial status, comfort, nutrition and hydration  Taken 12/4/2022 0400 by Rosaura Best RN  Remains free of injury from restraints (restraint for interference with medical device):   Determine that other, less restrictive measures have been tried or would not be effective before applying the restraint   Every 2 hours: Monitor safety, psychosocial status, comfort, nutrition and hydration  Taken 12/4/2022 0200 by Jessica Wells RN  Remains free of injury from restraints (restraint for interference with medical device):   Determine that other, less restrictive measures have been tried or would not be effective before applying the restraint   Every 2 hours: Monitor safety, psychosocial status, comfort, nutrition and hydration  Taken 12/4/2022 0000 by Jessica Wells RN  Remains free of injury from restraints (restraint for interference with medical device):   Determine that other, less restrictive measures have been tried or would not be effective before applying the restraint   Every 2 hours: Monitor safety, psychosocial status, comfort, nutrition and hydration

## 2022-12-04 NOTE — PROGRESS NOTES
Bandage changed on upper right leg. Bandage and brace saturated with urine. Placed ABD bandages on the cushions of brace since I could not replace the brace at this time. Also replaced the Ace bandage on upper leg. Consult already placed for wound care. Need re-consult to ortho for re-evaluation of surgical site. Discussed with resident.

## 2022-12-04 NOTE — PROGRESS NOTES
[facility] Quality Flow/Interdisciplinary Rounds Progress Note        Quality Flow Rounds held on December 4, 2022    Disciplines Attending:  Bedside nurse, charge nurse, , PT/OT, pharmacy, nursing unit leadership, , Medical residents     Margo Webber was admitted on 11/23/2022  8:11 PM    Anticipated Discharge Date:  Expected Discharge Date: 11/30/22    Disposition:    Osmar Score:  Osmar Scale Score: 12    Readmission Risk              Risk of Unplanned Readmission:  20           Discussed patient goal for the day, patient clinical progression, and barriers to discharge.   The following Goal(s) of the Day/Commitment(s) have been identified:  [facility], transfer patient       Pedro Luis Longoria RN  December 4, 2022

## 2022-12-04 NOTE — PROGRESS NOTES
Hospitalist Progress Note      SYNOPSIS: Patient admitted on 2022 for Other fracture of right femur, initial encounter for closed fracture ([facility] [address]      SUBJECTIVE:    Patient seen and examined. Patient does not like to be able to answer questions. Oriented to person and place and somewhat to time. He does report a history of seizures and that is why he is on Lamictal.  Discussed continue Levaquin for UTI. Records reviewed. Patient is a 76 y.o. male with a past medical history of hypertension, depression, osteoporosis who presents with the chief complaint of right knee pain following a fall from standing height. Patient states that he was taking a step up from his living room to his kitchen and as he went to take a step he felt his knee buckle fell to the ground. Denies any his head denies loss of consciousness. He states he had immediate onset pain to the right knee and subsequently sought medical treatment at [facility]. Was found to have a periprosthetic distal femur fracture. Transferred to [facility] for definitive care and treatment. Status post distal femur ORIF on 2022. Given altered mental status urinalysis and urine culture has been ordered to rule out UTI. Urine culture with E.coli. He was started on Levaquin renally dose due to anaphylaxis allergy with rocephin. Kidney function continues to worsen with hematuria, so urology consulted. Nephrology following for CARRINGTON. RRT called 12/3/22 due to patient being lethargic. He was hypotensive and transferred to ICU started on pressors due to concern for septic shock. Urology was consulted for hematuria. 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 ID following. Stable overnight. No other overnight issues reported. Temp (24hrs), Av.7 °F (37.1 °C), Min:97.9 °F (36.6 °C), Max:100 °F (37.8 °C)    DIET: ADULT DIET;  Dysphagia - Minced and Moist; Mildly Thick (Nectar)  ADULT ORAL NUTRITION SUPPLEMENT; Dinner; Other Oral Supplement; Gelatein 20  ADULT ORAL NUTRITION SUPPLEMENT; Breakfast, Lunch; Fortified Pudding Oral Supplement  CODE: Limited    Intake/Output Summary (Last 24 hours) at 12/4/2022 1212  Last data filed at 12/4/2022 0730  Gross per 24 hour   Intake 4827.04 ml   Output 745 ml   Net 4082.04 ml       OBJECTIVE:    BP (!) 111/51   Pulse 70   Temp 99 °F (37.2 °C) (Bladder)   Resp 22   Ht 6' 2\" (1.88 m)   Wt 212 lb (96.2 kg)   SpO2 98%   BMI 27.22 kg/m²     General appearance: No apparent distress, appears stated age and cooperative. HEENT:  Conjunctivae/corneas clear. Neck: Supple. No jugular venous distention. Respiratory: Clear to auscultation bilaterally, normal respiratory effort  Cardiovascular: Regular rate rhythm, normal S1-S2  Abdomen: Soft, nontender, nondistended  Musculoskeletal: No clubbing, cyanosis, no bilateral lower extremity edema. Brisk capillary refill. Skin:  No rashes  on visible skin  Neurologic: awake, alert and following commands     ASSESSMENT:  S/P distal Right femur ORIF - 11/25/22  Septic shock  CARRINGTON on CKD  Altered mental status multifactorial, hypernatremia, UTI  E.coli UTI  Hypernatremia  Hematuria  Hypotension  Acute on chronic anemia, postoperative blood loss  Mild-moderate oropharyngeal phase dysphagia  Ileus     PLAN:  Appreciate intensivist, nephrology, urology and infectious disease input. CT abdomen yesterday with bladder calculi or stone concern for emphysematous cystitis. Continue antibiotics with Levaquin renally dosed. Started on pressors yesterday with Levophed given hypotension concern for sepsis. Chest x-ray yesterday RRT with no acute pulmonary findings  Creatinine 4.8 today compared to 5.4. Continue LR per nephrology. Per nephrology if no improvement in renal function may benefit from hemodialysis in the next coming days. Concerning hematuria urology consulted, hemoglobin 8.5 similar to yesterday.   Continue oxygen support on 4 L. Abdominal x-ray with concern for possible ileus.   May benefit from general surgery consultation defer to ICU team.      DISPOSITION:     Medications:  REVIEWED DAILY    Infusion Medications    norepinephrine Stopped (12/03/22 2004)    lactated ringers 150 mL/hr at 12/04/22 0528    dextrose 100 mL/hr at 12/04/22 1782    sodium chloride       Scheduled Medications    ipratropium-albuterol  1 ampule Inhalation Q4H WA    meropenem  500 mg IntraVENous Q12H    levofloxacin  500 mg IntraVENous Q48H    [Held by provider] gabapentin  100 mg Oral TID    [Held by provider] enoxaparin  30 mg SubCUTAneous Daily    [Held by provider] methocarbamol  750 mg Oral 4x Daily    [Held by provider] carvedilol  12.5 mg Oral Daily    [Held by provider] furosemide  20 mg Oral Daily    lamoTRIgine  200 mg Oral Daily    [Held by provider] lisinopril  5 mg Oral Daily    pantoprazole  40 mg Oral QAM AC    [Held by provider] venlafaxine  300 mg Oral Daily    trospium  20 mg Oral Nightly    sodium chloride flush  10 mL IntraVENous 2 times per day    potassium chloride  20 mEq Oral BID WC     PRN Meds: hydrALAZINE, trimethobenzamide, benzocaine-menthol, morphine **OR** morphine, oxyCODONE **OR** oxyCODONE, sodium chloride flush, sodium chloride, polyethylene glycol, acetaminophen **OR** acetaminophen    Labs:     Recent Labs     12/03/22  1135 12/03/22  1302 12/04/22  0417   WBC 17.0* 14.7* 15.0*   HGB 10.0* 8.5* 8.5*   HCT 31.9* 27.4* 27.7*    159 158       Recent Labs     12/03/22  1150 12/03/22  1601 12/04/22  0417    148*  149* 145   K 4.4 4.4  4.4 4.1   * 120*  120* 116*   CO2 17* 16*  16* 15*   BUN 68* 68*  69* 70*   CREATININE 5.3* 5.4*  5.4* 4.8*   CALCIUM 9.3 8.8  8.8 8.8   PHOS  --  3.5 3.4       Recent Labs     12/03/22  1135 12/03/22  1150 12/04/22  0417   PROT 6.5 6.2* 5.5*   ALKPHOS 152* 152* 115   ALT 76* 77* 54*   * 217* 129*   BILITOT 0.7 0.7 0.4       No results for input(s): INR in the last 72 hours. Recent Labs     12/01/22  1526   CKTOTAL 117       Chronic labs:    Lab Results   Component Value Date    INR 1.2 11/24/2022       Radiology: REVIEWED DAILY    +++++++++++++++++++++++++++++++++++++++++++++++++  Flash Solano MD  [facility] - [address] [place] [address] Rd, [address]  +++++++++++++++++++++++++++++++++++++++++++++++++  NOTE: This report was transcribed using voice recognition software. Every effort was made to ensure accuracy; however, inadvertent computerized transcription errors may be present.

## 2022-12-04 NOTE — PROGRESS NOTES
Patient becoming slightly confused. Told me that he was going to give me \"30 million dollars\". Patient began pulling at parham and trying to struggle slightly when trying to remove his hand from the parham. Patient will benefit from restraints for safety at this time.

## 2022-12-04 NOTE — PROGRESS NOTES
Pharmacy Consultation Note  (Antibiotic Dosing and Monitoring)    Initial consult date: 12/3/22  Consulting physician/provider: Siria Judge  Drug: Vancomycin  Indication: Sepsis    Age/  Gender Height Weight IBW  Allergy Information   77 y.o./male 6' 2\" (188 cm) 212 lb (96.2 kg)     Ideal body weight: 82.2 kg (181 lb 3.5 oz)  Adjusted ideal body weight: 87.8 kg (193 lb 8.5 oz)   Ceftriaxone      Renal Function:  Recent Labs     12/03/22  1135 12/03/22  1150 12/03/22  1601   BUN 69* 68* 68*  69*   CREATININE 5.5* 5.3* 5.4*  5.4*       Intake/Output Summary (Last 24 hours) at 12/3/2022 2039  Last data filed at 12/3/2022 1814  Gross per 24 hour   Intake 1939.03 ml   Output 560 ml   Net 1379.03 ml       Vancomycin Monitoring:  Trough:  No results for input(s): VANCOTROUGH in the last 72 hours. Random:  No results for input(s): VANCORANDOM in the last 72 hours. No results for input(s): [address] in the last 72 hours. Historical Cultures:  Organism   Date Value Ref Range Status   11/30/2022 Escherichia coli (A)  Final     No results for input(s): BC in the last 72 hours. Vancomycin Administration Times:  Recent vancomycin administrations        No vancomycin IV orders with administrations found. Orders not given:            vancomycin 1500 mg in dextrose 5% 300 mL IVPB                    Assessment:  Patient is a 76 y.o. male who has been initiated on vancomycin  Estimated Creatinine Clearance: 13 mL/min (A) (based on SCr of 5.4 mg/dL (H)). Plan:   Will begin vancomycin 1500 mg IV every once  Will check vancomycin levels when appropriate  Will continue to monitor renal function   Pharmacy to follow      ERVIN Batres [facility] 12/3/2022 8:39 PM

## 2022-12-04 NOTE — PROGRESS NOTES
HOSPICE [facility]    Referral received from palliative care. Chart reviewed. Made visit to patient's room. Primo was sitting in bed mumbling and moving his arms. Complains of abdominal pain. Spouse Epi Lopez and daughter were present. Spoke with family outside of patient's room. Both are familiar with hospice. They are tearful, but know they are making the decision he would want. They want him to be comfortable. They do not feel he is comfortable now and cannot make his needs known. They would want him to go to a nursing home in [place] with hospice as it is closer to them. Epi Lopez knows a lot of the staff and unit 1 is for veterans and she would like him there. She states Kena Martinez is service connected and would like to see if the [place] would pay for his room and board. Explained tomorrow case management/ would reach out to [place] and [facility] regarding bed availability with hospice. Family also requested pain medications be scheduled and something for restlessness be ordered. Discussed with Darby from palliative care and she placed orders for medications. Hospice will follow-up tomorrow. Plan is ECF with hospice.     Electronically signed by Emily Edwards RN on 12/4/2022 at 2:02 PM   [phone]

## 2022-12-04 NOTE — CONSULTS
Palliative Care Department  [phone]  Palliative Care Initial Consult  Provider Jeramy Wadsworth, APRN - [address]  [id number]  Hospital Day: 12  Date of Initial Consult: 12/3/2022  Referring Provider: Anders Lefort, MD  Palliative Medicine was consulted for assistance with: Goals of care and CODE STATUS discussion    HPI:   Bijal Lima is a 76 y.o. with a medical history of hypertension, osteoporosis, and depression who was admitted on 11/23/2022 from [facility] with a [address] of right knee pain s/p fall. Patient was found to have periprosthetic distal femur fracture. Patient was transferred to [address] [place] [address] for further medical management. 11/25 underwent distal femur ORIF.  12/3 RRT called for lethargy. Patient was found to be hypotensive and transferred to ICU. 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 Later of palliative medicine was consulted for goals of care and CODE STATUS discussion  ASSESSMENT/PLAN:     Pertinent Hospital Diagnoses     Septic shock  CARRINGTON on CKD  S/p distal right femur ORIF    Palliative Care Encounter / Counseling Regarding Goals of Care  Please see detailed goals of care discussion as below  At this time, Primo R Citigustavo, Does Not have capacity for medical decision-making.   Capacity is time limited and situation/question specific  During encounter Amber Stevenson was surrogate medical decision-maker  Outcome of goals of care meeting:   Change CODE STATUS to DNR CC  Family would like patient to go to a nursing facility in [place] for hospice care  Consult hospice  Code status DNR-CC  Advanced Directives: no POA or living will in [facility]  Surrogate/Legal NOK:  Arlene Garland (spouse) [phone]  Fitz Mehta (child) [phone]  Eunice Hermosillo (child) [phone]      Spiritual assessment: no spiritual distress identified  Bereavement and grief: to be determined  Referrals to: hospice [facility]  SUBJECTIVE:     Current medical issues leading to Palliative Medicine involvement include   Active Hospital Problems    Diagnosis Date Noted    Supracondyl fx femur-closed, right, initial encounter [facility]) Gopi Cunningham 11/25/2022     Priority: Medium    Other fracture of right femur, initial encounter for closed fracture [facility]) Jael Dennis 11/23/2022     Priority: Medium       Details of Conversation:    Chart reviewed. Update received from nursing and critical care team.  Patient seen laying in bed. Eyes open however patient cannot follow commands. Wife, Kaitlin Felix, and daughter at bedside. Role of palliative medicine introduced. Kaitlin Felix states that he has a good understanding of palliative medicine as she is a retired registered nurse. Kaitlin Felix states her  does have a living will and she is healthcare power of . In-depth discussion regarding current condition to include septic shock and CKD that is going to require dialysis. In-depth discussion regarding goals of care and CODE STATUS option. Kaitlin Felix states her  has made his wishes very well-known which was he would never want machines for his life prolonged. In-depth discussion regarding comfort care and hospice care options. After further discussion at this time Kaitlin Felix would like to change CODE STATUS to DNR CC and consult hospice. She states she would like her  to be discharged to a facility in [facility] that is close to her home with hospice. Kaitlin Felix states her main goal is that her  does not suffer. Emotional support given and all questions addressed. Nursing and critical care updated with plan. We will continue to follow for ongoing goals of care discussion as well as support for the patient and family.     OBJECTIVE:   Prognosis: Poor    Physical Exam:  BP (!) 111/51   Pulse 72   Temp 99 °F (37.2 °C) (Bladder)   Resp 21   Ht 6' 2\" (1.88 m)   Wt 212 lb (96.2 kg)   SpO2 99%   BMI 27.22 kg/m²   Constitutional: Eyes open  Lungs:  CTA bilaterally, no audible rhonchi or wheezes noted, respirations unlabored, no retractions  Heart: RRR, distant heart tones, no murmur, rub, or gallop noted during exam  Abd:  Soft, non tender, non distended, bowel sounds present  Skin:  Warm and dry, no rashes on visible skin  Psych: non-anxious affect  Neuro: Eyes open, not following commands    Objective data reviewed: labs, images, records, medication use, vitals, and chart    Discussed patient and the plan of care with the other IDT members: Palliative Medicine IDT Team, Hospice Team, Primary Team, Floor Nurse, Patient, and Family    Time/Communication  Greater than 50% of time spent, total 50 minutes in counseling and coordination of care at the bedside regarding goals of care and diagnosis and prognosis. Thank you for allowing Palliative Medicine to participate in the care of [facility].

## 2022-12-04 NOTE — FLOWSHEET NOTE
Patient DNR comfort care Hospice consulted will transfer to medical private room. Plan to transfer to [facility] building one when available on Monday when bed available.

## 2022-12-04 NOTE — PLAN OF CARE
Problem: Discharge Planning  Goal: Discharge to home or other facility with appropriate resources  12/4/2022 0813 by Kale Barillas RN  Outcome: Progressing  12/4/2022 0737 by Kale Barillas RN  Outcome: Progressing  Flowsheets (Taken 12/4/2022 0730)  Discharge to home or other facility with appropriate resources: Identify barriers to discharge with patient and caregiver     Problem: Pain  Goal: Verbalizes/displays adequate comfort level or baseline comfort level  12/4/2022 0813 by Kale Barillas RN  Outcome: Progressing  12/4/2022 0737 by Kale Barillas RN  Outcome: Progressing  Flowsheets (Taken 12/4/2022 0200 by Carl Vazquez RN)  Verbalizes/displays adequate comfort level or baseline comfort level: Encourage patient to monitor pain and request assistance     Problem: Safety - Adult  Goal: Free from fall injury  12/4/2022 0813 by Kale Barillas RN  Outcome: Progressing  12/4/2022 0737 by Kale Barillas RN  Outcome: Progressing     Problem: ABCDS Injury Assessment  Goal: Absence of physical injury  12/4/2022 0813 by Kale Barillas RN  Outcome: Progressing  12/4/2022 0737 by Kale Barillas RN  Outcome: Progressing     Problem: Skin/Tissue Integrity  Goal: Absence of new skin breakdown  Description: 1. Monitor for areas of redness and/or skin breakdown  2. Assess vascular access sites hourly  3. Every 4-6 hours minimum:  Change oxygen saturation probe site  4. Every 4-6 hours:  If on nasal continuous positive airway pressure, respiratory therapy assess nares and determine need for appliance change or resting period.   12/4/2022 0813 by Kale Barillas RN  Outcome: Progressing  12/4/2022 0737 by Kale Barillas RN  Outcome: Progressing     Problem: Nutrition Deficit:  Goal: Optimize nutritional status  12/4/2022 0813 by Kale Barillas RN  Outcome: Progressing  12/4/2022 0737 by Kale Barillas RN  Outcome: Progressing

## 2022-12-04 NOTE — PROGRESS NOTES
Patient had diminished urine production. Resident's asked for parham to be flushed. Upon attempting to flush the catheter, the parham came out of the patient. The balloon was deflated upon examination. . A new parham was reinserted and urine was immediately returned. Due to flushing the bladder, 24 hour urine test needs to be restarted. Residents are aware.

## 2022-12-04 NOTE — SIGNIFICANT EVENT
Critical Care - Rapid Response Team Note      Date of event: 12/3/2022   Time of event: 36    Primo R Citino 76y.o. year old male   YOB: 1945     PCP:  Nicolette Person DO   Location: [facility]   Witnessed? : [x]Yes  [] No  Initial Code status: [x] Full  [] DNR-CCA  []DNR-CC    []Limited  ______________________________________________________________________  Reason for RRT:   Altered Mental Status    Chief Complaint for this admission:   Chief Complaint   Patient presents with    Knee Pain     Transfer [place]. Right knee gave out earlier. Knee swollen. Xray done distal femur fx, arrived with knee immobilizer on       Admit date:  11/23/2022     Admitting Diagnosis: Other fracture of right femur, initial encounter for closed fracture ([address] Juan Hugger  Fall from standing, initial encounter [W19. XXXA]  Supracondyl fx femur-closed, right, initial encounter ([address] [S72.451A]      Current Diagnosis: The primary encounter diagnosis was Supracondyl fx femur-closed, right, initial encounter ([address] A diagnosis of Fall from standing, initial encounter was also pertinent to this visit. Subjective:     Rapid was called for AMS. On arrival patient was somnalent and mouth breathing. He was unable to be redirectable. His GCS was noted to be 7. He was able to protect his airway.  Per bedside nurse he is on multiple pysch meds which was given all at once before the event    Objective:   Initial Assessment on arrival:  Vital signs: Temp: afebrile, BP: 92/58, HR: 92 SpO2:92 on NC    Airway and Condition: Pulse present and Breathing noted                  Neurologic: responds to pain and does not follow commands noted, GCS on 7      Initial Interventions: Labs ordered: CBC, Mg, Phos, TSH, B12 folate, ABG  Imaging ordered: CT head, CXR  Meds/Fluids/Rx given: 1 L LR and breathing treatment          Subsequent Assessment After Initial Interventions:   Time Since first assessment: 20 min     Interim Vital Sign Checks: [x] Yes  [] No    Vital signs: Temp: afebrile, BP: 104/57 HR: 90 SpO2:97 on NC    Airway and Condition: Pulse present noted    Lungs And Circulation: rhonchi heard in both lung field  noted                  Neurologic: responds to pain GCS 7      Subsequent Interventions: Results of initial labs and imaging: Na 147, BUN 68, Cr 5.5, . AB.3/34/109/17. CT head showed no acute intracranial abnormalities. CT abdomen unremarkable. Hb stable , elevate WBC of 17                RRT Assessment and Plan:    Babatunde Fields is a 76 y.o. male with  has no past medical history on file. who was admitted on 2022 with admitting diagnosis Other fracture of right femur, initial encounter for closed fracture ([facility] [address] Manda Dover  Fall from standing, initial encounter [W19. XXXA]  Supracondyl fx femur-closed, right, initial encounter ([facility] [address] Kerrie Gaytan  . RRT was called on 12/3/2022 . Initial assessment and interventions as noted above. Current problems include:   Acute metabolic encephalopathy secondary to sepsis versus uremia versus other  Acute hypoxic respiratory insufficiency secondary to sepsis    Plan:   Hold all psych meds  Continue antibiotic per ID for UTI  Transfer to medical ICU for close observation  Obtain NICOM and resuscitated appropriately  RRT labs and imaging reviewed  ?     Code status: [x] Full  [] DNR-CCA  []DNR-CC []Limited    Disposition:  [] No transfer   [] Transfer to monitor floor  [x] Transfer to: [x] MICU [] NICU [] CVICU [] SICU    Patients family updated:     [] Yes  [] No   Discussed with:  [x] Critical Care Intensivist: Dr. Reyna Damon      [] Primary Care Provider: Dr. Festus Leventhal      [] Other: ?    Albania Edwards MD PGY-3  12/3/2022 8:44 PM  Attending Physician: Dr. Dane Raines

## 2022-12-04 NOTE — PROGRESS NOTES
[address] [place] [address] [facility]  [place]  Progress Note      Chief Complaint   Patient presents with    Knee Pain     Transfer from [place]. Right knee gave out earlier. Knee swollen. Xray done distal femur fx, arrived with knee immobilizer on       SUBJECTIVE:  76 y.o. male with a past medical history of hypertension, depression, osteoporosis who presents with the chief complaint of right knee pain following a fall from standing height. Found to have periprosthetic distal femur fracture. S/p distal femur ORIF 11/25. Patient was found to be altered after surgery and urine culture showed E. coli. Levofloxacin was started as patient has anaphylactic reaction to ceftriaxone. ID consulted for suspected UTI. Patient is tolerating medications. No reported adverse drug reactions. No nausea, vomiting, diarrhea. Review of systems:  As stated above in the chief complaint, otherwise negative.     Medications:  Scheduled Meds:   HYDROmorphone  0.5 mg IntraVENous Q6H    ipratropium-albuterol  1 ampule Inhalation Q4H WA    levofloxacin  500 mg IntraVENous Q48H    [Held by provider] gabapentin  100 mg Oral TID    [Held by provider] enoxaparin  30 mg SubCUTAneous Daily    [Held by provider] methocarbamol  750 mg Oral 4x Daily    [Held by provider] carvedilol  12.5 mg Oral Daily    [Held by provider] furosemide  20 mg Oral Daily    lamoTRIgine  200 mg Oral Daily    [Held by provider] lisinopril  5 mg Oral Daily    pantoprazole  40 mg Oral QAM AC    [Held by provider] venlafaxine  300 mg Oral Daily    trospium  20 mg Oral Nightly    sodium chloride flush  10 mL IntraVENous 2 times per day    potassium chloride  20 mEq Oral BID WC     Continuous Infusions:   norepinephrine Stopped (12/03/22 2004)    [Held by provider] lactated ringers 150 mL/hr at 12/04/22 0528    [Held by provider] dextrose 100 mL/hr at 12/04/22 0635    sodium chloride       PRN Meds:HYDROmorphone, LORazepam, hydrALAZINE, trimethobenzamide, benzocaine-menthol, oxyCODONE **OR** oxyCODONE, sodium chloride flush, sodium chloride, polyethylene glycol, acetaminophen **OR** acetaminophen    OBJECTIVE:  BP (!) 114/54   Pulse 75   Temp 99 °F (37.2 °C) (Bladder)   Resp 20   Ht 6' 2\" (1.88 m)   Wt 250 lb (113.4 kg)   SpO2 90%   BMI 32.10 kg/m²   Temp  Av.5 °F (36.9 °C)  Min: 97.9 °F (36.6 °C)  Max: 99 °F (37.2 °C)  Constitutional: Patient opens eyes on verbal command but not following commands  Skin: Warm and dry. No rashes were noted. No jaundice. HEENT: Eyes show round, and reactive pupils. Moist mucous membranes, no ulcerations, no thrush. Neck: Supple to movements. No lymphadenopathy. Chest: No use of accessory muscles to breathe. Symmetrical expansion. Auscultation reveals no wheezing, crackles, or rhonchi. Cardiovascular: S1 and S2 are rhythmic and regular. No murmurs appreciated. Abdomen: Positive bowel sounds to auscultation. Benign to palpation. No masses felt. No hepatosplenomegaly. Genitourinary: No pain in the lower abdomen  Extremities: No clubbing, no cyanosis, no edema. Musculoskeletal: Right femoral ORIF noted.   Neurological: Not following commands, no focal neurodeficit  Lines: peripheral    Laboratory and Tests Review:  Lab Results   Component Value Date    WBC 15.0 (H) 2022    WBC 14.7 (H) 2022    WBC 17.0 (H) 2022    HGB 8.5 (L) 2022    HCT 27.7 (L) 2022    MCV 90.8 2022     2022     Lab Results   Component Value Date    NEUTROABS 12.90 (H) 2022    NEUTROABS 12.20 (H) 2022    NEUTROABS 15.47 (H) 2022     No results found for: [facility]  Lab Results   Component Value Date    ALT 54 (H) 2022     (H) 2022    ALKPHOS 115 2022    BILITOT 0.4 2022     Lab Results   Component Value Date/Time     2022 04:17 AM    K 4.1 2022 04:17 AM     2022 04:17 AM    CO2 15 2022 04:17 AM    BUN 70 2022 04:17 AM    CREATININE 4.8 12/04/2022 04:17 AM    CREATININE 5.4 12/03/2022 04:01 PM    CREATININE 5.4 12/03/2022 04:01 PM    LABGLOM 12 12/04/2022 04:17 AM    GLUCOSE 166 12/04/2022 04:17 AM    PROT 5.5 12/04/2022 04:17 AM    LABALBU 1.8 12/04/2022 04:17 AM    CALCIUM 8.8 12/04/2022 04:17 AM    BILITOT 0.4 12/04/2022 04:17 AM    ALKPHOS 115 12/04/2022 04:17 AM     12/04/2022 04:17 AM    ALT 54 12/04/2022 04:17 AM     No results found for: CRP  No results found for: [address]  Radiology:      Microbiology:   Lab Results   Component Value Date/Time    BC 24 Hours no growth 12/03/2022 01:24 PM    ORG Escherichia coli 11/30/2022 11:59 AM     Lab Results   Component Value Date/Time    BLOODCULT2 24 Hours no growth 12/03/2022 01:25 PM    ORG Escherichia coli 11/30/2022 11:59 AM     No results found for: WNDABS  No results found for: RESPSMEAR  No results found for: MPNEUMO, CLAMYDCU, LABLEGI, AFBCX, FUNGSM, LABFUNG  No results found for: CULTRESP  No results found for: CXCATHTIP  No results found for: BFCS  No results found for: CXSURG  Urine Culture, Routine   Date Value Ref Range Status   12/03/2022 <10,000 CFU/mL  Gram negative rods    Preliminary   11/30/2022 >100,000 CFU/ml  Final     No results found for: [facility]    Assessment:  Pansensitive E. coli UTI  Ceftriaxone allergy with anaphylaxis  Altered mental status  Toxic metabolic encephalopathy  Right femoral fracture s/p ORIF 11/25  Hematuria     Plan:    Patient is DNR/CCA now. Stop Levaquin. Rest of the management as per MICU team & Palliative care.     Angely Houston MD  3:46 PM  12/4/2022

## 2022-12-05 VITALS
DIASTOLIC BLOOD PRESSURE: 61 MMHG | HEART RATE: 75 BPM | HEIGHT: 74 IN | SYSTOLIC BLOOD PRESSURE: 121 MMHG | RESPIRATION RATE: 26 BRPM | TEMPERATURE: 97.2 F | WEIGHT: 250 LBS | BODY MASS INDEX: 32.08 KG/M2 | OXYGEN SATURATION: 95 %

## 2022-12-05 LAB
ADDENDUM ELECTROPHORESIS URINE RANDOM: NORMAL
ALBUMIN SERPL-MCNC: 1.5 G/DL (ref 3.5–4.7)
ALBUMIN SERPL-MCNC: 1.7 G/DL (ref 3.5–5.2)
ALP BLD-CCNC: 135 U/L (ref 40–129)
ALPHA-1-GLOBULIN: 0.6 G/DL (ref 0.2–0.4)
ALPHA-2-GLOBULIN: 1.2 G/DL (ref 0.5–1)
ALT SERPL-CCNC: 42 U/L (ref 0–40)
ANION GAP SERPL CALCULATED.3IONS-SCNC: 11 MMOL/L (ref 7–16)
ANION GAP SERPL CALCULATED.3IONS-SCNC: 12 MMOL/L (ref 7–16)
ANION GAP SERPL CALCULATED.3IONS-SCNC: 12 MMOL/L (ref 7–16)
AST SERPL-CCNC: 75 U/L (ref 0–39)
BASOPHILS ABSOLUTE: 0 E9/L (ref 0–0.2)
BASOPHILS RELATIVE PERCENT: 0.7 % (ref 0–2)
BETA GLOBULIN: 1.2 G/DL (ref 0.8–1.3)
BILIRUB SERPL-MCNC: 0.4 MG/DL (ref 0–1.2)
BUN BLDV-MCNC: 66 MG/DL (ref 6–23)
BUN BLDV-MCNC: 68 MG/DL (ref 6–23)
BUN BLDV-MCNC: 70 MG/DL (ref 6–23)
BURR CELLS: ABNORMAL
CALCIUM SERPL-MCNC: 9.1 MG/DL (ref 8.6–10.2)
CALCIUM SERPL-MCNC: 9.2 MG/DL (ref 8.6–10.2)
CALCIUM SERPL-MCNC: 9.2 MG/DL (ref 8.6–10.2)
CHLORIDE BLD-SCNC: 116 MMOL/L (ref 98–107)
CHLORIDE BLD-SCNC: 117 MMOL/L (ref 98–107)
CHLORIDE BLD-SCNC: 118 MMOL/L (ref 98–107)
CO2: 16 MMOL/L (ref 22–29)
CO2: 17 MMOL/L (ref 22–29)
CO2: 18 MMOL/L (ref 22–29)
CREAT SERPL-MCNC: 4.7 MG/DL (ref 0.7–1.2)
CREAT SERPL-MCNC: 4.9 MG/DL (ref 0.7–1.2)
CREAT SERPL-MCNC: 4.9 MG/DL (ref 0.7–1.2)
ELECTROPHORESIS: ABNORMAL
EOSINOPHILS ABSOLUTE: 0.57 E9/L (ref 0.05–0.5)
EOSINOPHILS RELATIVE PERCENT: 2.6 % (ref 0–6)
GAMMA GLOBULIN: 1 G/DL (ref 0.7–1.6)
GFR SERPL CREATININE-BSD FRML MDRD: 11 ML/MIN/1.73
GFR SERPL CREATININE-BSD FRML MDRD: 11 ML/MIN/1.73
GFR SERPL CREATININE-BSD FRML MDRD: 12 ML/MIN/1.73
GLUCOSE BLD-MCNC: 100 MG/DL (ref 74–99)
GLUCOSE BLD-MCNC: 104 MG/DL (ref 74–99)
GLUCOSE BLD-MCNC: 93 MG/DL (ref 74–99)
HAV IGM SER IA-ACNC: NORMAL
HCT VFR BLD CALC: 29.7 % (ref 37–54)
HEMOGLOBIN: 9.2 G/DL (ref 12.5–16.5)
HEPATITIS B CORE IGM ANTIBODY: NORMAL
HEPATITIS B SURFACE ANTIGEN INTERPRETATION: NORMAL
HEPATITIS C ANTIBODY INTERPRETATION: NORMAL
IMMUNOFIXATION RESULT, SERUM: NORMAL
IMMUNOFIXATION URINE: NORMAL
LYMPHOCYTES ABSOLUTE: 0.66 E9/L (ref 1.5–4)
LYMPHOCYTES RELATIVE PERCENT: 2.6 % (ref 20–42)
MAGNESIUM: 2.3 MG/DL (ref 1.6–2.6)
MCH RBC QN AUTO: 27.9 PG (ref 26–35)
MCHC RBC AUTO-ENTMCNC: 31 % (ref 32–34.5)
MCV RBC AUTO: 90 FL (ref 80–99.9)
MONOCYTES ABSOLUTE: 0.88 E9/L (ref 0.1–0.95)
MONOCYTES RELATIVE PERCENT: 3.5 % (ref 2–12)
MRSA CULTURE ONLY: NORMAL
MYELOCYTE PERCENT: 0.9 % (ref 0–0)
NEUTROPHILS ABSOLUTE: 20.11 E9/L (ref 1.8–7.3)
NEUTROPHILS RELATIVE PERCENT: 90.4 % (ref 43–80)
OVALOCYTES: ABNORMAL
PDW BLD-RTO: 20.2 FL (ref 11.5–15)
PHOSPHORUS: 4.2 MG/DL (ref 2.5–4.5)
PLATELET # BLD: 186 E9/L (ref 130–450)
PMV BLD AUTO: 11.2 FL (ref 7–12)
POIKILOCYTES: ABNORMAL
POLYCHROMASIA: ABNORMAL
POTASSIUM SERPL-SCNC: 4.4 MMOL/L (ref 3.5–5)
POTASSIUM SERPL-SCNC: 4.5 MMOL/L (ref 3.5–5)
POTASSIUM SERPL-SCNC: 4.8 MMOL/L (ref 3.5–5)
RBC # BLD: 3.3 E12/L (ref 3.8–5.8)
SARS-COV-2, NAAT: NOT DETECTED
SODIUM BLD-SCNC: 144 MMOL/L (ref 132–146)
SODIUM BLD-SCNC: 146 MMOL/L (ref 132–146)
SODIUM BLD-SCNC: 147 MMOL/L (ref 132–146)
TOTAL PROTEIN: 5.5 G/DL (ref 6.4–8.3)
TOTAL PROTEIN: 5.7 G/DL (ref 6.4–8.3)
VANCOMYCIN RANDOM: 14.1 MCG/ML (ref 5–40)
WBC # BLD: 22.1 E9/L (ref 4.5–11.5)

## 2022-12-05 PROCEDURE — 6360000002 HC RX W HCPCS: Performed by: FAMILY MEDICINE

## 2022-12-05 PROCEDURE — 6360000002 HC RX W HCPCS: Performed by: NURSE PRACTITIONER

## 2022-12-05 PROCEDURE — 2580000003 HC RX 258: Performed by: PHYSICAL THERAPY ASSISTANT

## 2022-12-05 PROCEDURE — 2700000000 HC OXYGEN THERAPY PER DAY

## 2022-12-05 PROCEDURE — 36415 COLL VENOUS BLD VENIPUNCTURE: CPT

## 2022-12-05 PROCEDURE — 92526 ORAL FUNCTION THERAPY: CPT

## 2022-12-05 PROCEDURE — 84100 ASSAY OF PHOSPHORUS: CPT

## 2022-12-05 PROCEDURE — 80053 COMPREHEN METABOLIC PANEL: CPT

## 2022-12-05 PROCEDURE — 87635 SARS-COV-2 COVID-19 AMP PRB: CPT

## 2022-12-05 PROCEDURE — 83735 ASSAY OF MAGNESIUM: CPT

## 2022-12-05 PROCEDURE — 85025 COMPLETE CBC W/AUTO DIFF WBC: CPT

## 2022-12-05 PROCEDURE — 80048 BASIC METABOLIC PNL TOTAL CA: CPT

## 2022-12-05 PROCEDURE — 80202 ASSAY OF VANCOMYCIN: CPT

## 2022-12-05 RX ORDER — GLYCOPYRROLATE 1 MG/1
2 TABLET ORAL EVERY 8 HOURS PRN
Qty: 18 TABLET | Refills: 0 | Status: SHIPPED | OUTPATIENT
Start: 2022-12-05 | End: 2022-12-08

## 2022-12-05 RX ORDER — MORPHINE SULFATE 100 MG/5ML
5 SOLUTION ORAL
Qty: 15 ML | Refills: 0 | Status: SHIPPED | OUTPATIENT
Start: 2022-12-05 | End: 2022-12-10

## 2022-12-05 RX ORDER — LORAZEPAM 1 MG/1
1 TABLET ORAL EVERY 4 HOURS PRN
Qty: 18 TABLET | Refills: 0 | Status: SHIPPED | OUTPATIENT
Start: 2022-12-05 | End: 2022-12-08

## 2022-12-05 RX ADMIN — SODIUM CHLORIDE, PRESERVATIVE FREE 10 ML: 5 INJECTION INTRAVENOUS at 10:00

## 2022-12-05 RX ADMIN — HYDROMORPHONE HYDROCHLORIDE 0.5 MG: 1 INJECTION, SOLUTION INTRAMUSCULAR; INTRAVENOUS; SUBCUTANEOUS at 09:58

## 2022-12-05 RX ADMIN — HYDROMORPHONE HYDROCHLORIDE 0.5 MG: 1 INJECTION, SOLUTION INTRAMUSCULAR; INTRAVENOUS; SUBCUTANEOUS at 02:27

## 2022-12-05 RX ADMIN — LEVOFLOXACIN 500 MG: 5 INJECTION, SOLUTION INTRAVENOUS at 10:06

## 2022-12-05 NOTE — PROGRESS NOTES
Nutrition Note    Type and Reason for Visit: Reassess    Nutrition Assessment:  Pt to be re-assessed per planned nutritional follow-up. Chart reviewed. Unfortunately, pt now changed to [facility] w/ noted plan for tentative d/c to SNF w/ hospice. Will sign-off. Please consult if RD needed.   Too soon to determine     Kendrick Cisneros RD, LD  Contact: ext [phone]

## 2022-12-05 NOTE — DISCHARGE SUMMARY
Hospitalist Discharge Summary    Patient ID: Cecilia Peterson   Patient : 1945  Patient's PCP: Brian Florian DO    Admit Date: 2022   Admitting Physician: Daljit Hernandez DO    Discharge Date:  2022  Discharge Physician: Darina Osgood, MD   Discharge Condition: Stable  Discharge Disposition: SNF with hospice    History of presenting illness:  Patient is a 76 y.o. male with a past medical history of hypertension, depression, osteoporosis who presents with the chief complaint of right knee pain following a fall from standing height. Patient states that he was taking a step up from his living room to his kitchen and as he went to take a step he felt his knee buckle fell to the ground. Denies any his head denies loss of consciousness. He states he had immediate onset pain to the right knee and subsequently sought medical treatment at [facility]. Was found to have a periprosthetic distal femur fracture. Transferred to [facility] for definitive care and treatment. Status post distal femur ORIF on 2022. Given altered mental status urinalysis and urine culture has been ordered to rule out UTI. Urine culture with E.coli. He was started on Levaquin renally dose due to anaphylaxis allergy with rocephin. Kidney function continues to worsen with hematuria, so urology consulted. Nephrology following for CARRINGTON. RRT called 12/3/22 due to patient being lethargic. He was hypotensive and transferred to ICU started on pressors due to concern for septic shock. Urology was consulted for hematuria. 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 He was continued on antibiotics with Levaquin per ID recommendation. Given his continued decline family decided to make an hospice. He was changed to DNR comfort care.     Hospital course in brief:  (Please refer to daily progress notes for a comprehensive review of the hospitalization by requesting medical records)  As above    Consults:   IP CONSULT TO INTERNAL MEDICINE  INPATIENT CONSULT TO ORTHOTIST/PROSTHETIST  IP CONSULT TO NEPHROLOGY  IP CONSULT TO UROLOGY  IP CONSULT TO INFECTIOUS DISEASES  IP CONSULT TO PALLIATIVE CARE  IP CONSULT TO PHARMACY  IP CONSULT TO HOSPICE    Physical exam  General appearance: No apparent distress, restful at this time   HEENT:  Conjunctivae/corneas clear. Neck: Supple. No jugular venous distention. Respiratory: Clear to auscultation bilaterally, normal respiratory effort  Cardiovascular: Regular rate rhythm, normal S1-S2  Abdomen: Soft, nontender, nondistended  Musculoskeletal: No clubbing, cyanosis, no bilateral lower extremity edema. Brisk capillary refill.    Skin:  No rashes  on visible skin  Neurologic: Restful and not following commands       Discharge Diagnoses:  S/P distal Right femur ORIF - 11/25/22  Septic shock  CARRINGTON on CKD  Altered mental status multifactorial, hypernatremia, UTI  E.coli UTI  Hypernatremia  Hematuria  Hypotension  Acute on chronic anemia, postoperative blood loss  Mild-moderate oropharyngeal phase dysphagia  Ileus    Discharge Instructions / Follow up:    Continued appropriate risk factor modification of blood pressure, diabetes and serum lipids will remain essential to reducing risk of future atherosclerotic development    Activity: activity as tolerated    Significant labs:  CBC:   Recent Labs     12/03/22  1302 12/04/22  0417 12/05/22  0658   WBC 14.7* 15.0* 22.1*   RBC 3.07* 3.05* 3.30*   HGB 8.5* 8.5* 9.2*   HCT 27.4* 27.7* 29.7*   MCV 89.3 90.8 90.0   RDW 20.2* 20.3* 20.2*    158 186     BMP:   Recent Labs     12/03/22  1601 12/04/22  0417 12/04/22  2351 12/05/22  0658   *  149* 145 144 146   K 4.4  4.4 4.1 4.4 4.5   *  120* 116* 116* 118*   CO2 16*  16* 15* 17* 16*   BUN 68*  69* 70* 66* 68*   CREATININE 5.4*  5.4* 4.8* 4.7* 4.9*   MG 2.0 1.8  --  2.3   PHOS 3.5 3.4  --  4.2     LFT:  Recent Labs     12/03/22  1150 12/04/22  0417 12/05/22  0658   PROT 6.2* 5.5* 5.7*   ALKPHOS 152* 115 135*   ALT 77* 54* 42*   * 129* 75*   BILITOT 0.7 0.4 0.4     PT/INR: No results for input(s): INR, APTT in the last 72 hours. BNP: No results for input(s): BNP in the last 72 hours. Hgb A1C: No results found for: LABA1C  Folate and B12: No results found for: XXSDHQXZ29, No results found for: FOLATE  Thyroid Studies: No results found for: TSH, X5LBQGJ, F7TQBWN, THYROIDAB    Urinalysis:    Lab Results   Component Value Date/Time    NITRU POSITIVE 12/03/2022 04:01 PM    WBCUA >20 12/03/2022 04:01 PM    BACTERIA MANY 12/03/2022 04:01 PM    RBCUA PACKED 12/03/2022 04:01 PM    BLOODU LARGE 12/03/2022 04:01 PM    SPECGRAV 1.020 12/03/2022 04:01 PM    GLUCOSEU 100 12/03/2022 04:01 PM       Imaging:  CT ABDOMEN PELVIS WO CONTRAST Additional Contrast? None    Result Date: 12/3/2022  EXAMINATION: CT OF THE ABDOMEN AND PELVIS WITHOUT CONTRAST 12/3/2022 12:25 pm TECHNIQUE: CT of the abdomen and pelvis was performed without the administration of intravenous contrast. Multiplanar reformatted images are provided for review. Automated exposure control, iterative reconstruction, and/or weight based adjustment of the mA/kV was utilized to reduce the radiation dose to as low as reasonably achievable. COMPARISON: None. HISTORY: ORDERING SYSTEM PROVIDED HISTORY: RRT, abdominal tenderness TECHNOLOGIST PROVIDED HISTORY: Reason for exam:->RRT, abdominal tenderness Additional Contrast?->None What reading provider will be dictating this exam?->CRC FINDINGS: Lower Chest: Lung bases reveal atelectasis at the right lung base without consolidation or effusion Organs: Liver without focal lesion. Gallbladder demonstrates layering density in the dependent portion most notable in the body and fundus likely sludge with stone in the proximal body of cholelithiasis however no wall thickening or biliary dilatation. .  Pancreas and spleen unremarkable.  Adrenals without nodule. Kidneys without suspicious renal lesion and no hydronephrosis. GI/Bowel: No focal thickening or disproportion dilatation of bowel. No inflammatory findings. 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 Peritoneum/Retroperitoneum: No bulky retroperitoneal adenopathy. No suspicious peritoneal or mesenteric process Vasculature: Grossly normal caliber of abdominal aorta and vasculature Bones/Soft Tissues: No acute osseous or soft tissue findings. Sacral nerve stimulator in place. Right hip arthroplasty in place without evidence of loosening failure. Associated artifact. 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 Cholelithiasis without gallbladder wall thickening or pericholecystic fluid     XR FEMUR RIGHT (MIN 2 VIEWS)    Result Date: 11/25/2022  EXAMINATION: 14 XRAY VIEWS OF THE RIGHT FEMUR 11/25/2022 2:00 pm COMPARISON: X-ray 11/23/2022, CT femur 11/23/2022 HISTORY: ORDERING SYSTEM PROVIDED HISTORY: post op TECHNOLOGIST PROVIDED HISTORY: Reason for exam:->post op What reading provider will be dictating this exam?->CRC FINDINGS: Status post right femoral ORIF with plate and screw fixation hardware along the lateral margin along with right total knee arthroplasty remain in place spanning the supracondylar fracture of the distal femur. Anna Balling Expected postsurgical changes and anatomic alignment evident     Status post right femoral ORIF with improved anatomic alignment spanning the right distal femoral fracture with expected postsurgical changes in the soft tissues     XR FEMUR RIGHT (MIN 2 VIEWS)    Result Date: 11/23/2022  EXAMINATION: XRAY VIEWS OF THE RIGHT FEMUR 11/23/2022 8:49 pm COMPARISON: None. HISTORY: ORDERING SYSTEM PROVIDED HISTORY: Fall from standing, reported distal periprosthetic femur fracture TECHNOLOGIST PROVIDED HISTORY: Reason for exam:->Fall from standing, reported distal periprosthetic femur fracture What reading provider will be dictating this exam?->CRC FINDINGS: Supracondylar fracture with apparent angulation and impaction is at the femur along the superior margin of knee replacement. Bone mineral density appears low. Hip replacement present. Angulated and impacted supracondylar fracture. XR KNEE RIGHT (1-2 VIEWS)    Result Date: 11/23/2022  EXAMINATION: TWO XRAY VIEWS OF THE RIGHT KNEE 11/23/2022 8:49 pm COMPARISON: None. HISTORY: ORDERING SYSTEM PROVIDED HISTORY: periprosthetic distal femur fracture TECHNOLOGIST PROVIDED HISTORY: Reason for exam:->periprosthetic distal femur fracture What reading provider will be dictating this exam?->CRC FINDINGS: Supracondylar fracture with apparent angulation and impaction is at the femur along the superior margin of knee replacement. Bone mineral density appears low. Angulated and impacted supracondylar fracture. XR ABDOMEN (KUB) (SINGLE AP VIEW)    Result Date: 12/3/2022  EXAMINATION: ONE SUPINE XRAY VIEW(S) OF THE ABDOMEN 12/3/2022 12:47 pm COMPARISON: None. HISTORY: ORDERING SYSTEM PROVIDED HISTORY: AMS TECHNOLOGIST PROVIDED HISTORY: Reason for exam:->AMS What reading provider will be dictating this exam?->CRC FINDINGS: Gaseous distended and dilated colonic segments concerning for ileus pattern. Small bowel is nondilated and not as well visualized.   No obvious intra-abdominal free air. Right sacral nerve stimulator in place along with right hip arthroplasty and degenerative changes of the spine     Gaseous distended and dilated colonic segments concerning for ileus pattern. Paucity of air in the small bowel. CT HEAD WO CONTRAST    Result Date: 12/3/2022  EXAMINATION: CT OF THE HEAD WITHOUT CONTRAST  12/3/2022 12:25 pm TECHNIQUE: CT of the head was performed without the administration of intravenous contrast. Automated exposure control, iterative reconstruction, and/or weight based adjustment of the mA/kV was utilized to reduce the radiation dose to as low as reasonably achievable. COMPARISON: None. HISTORY: ORDERING SYSTEM PROVIDED HISTORY: altered mental status TECHNOLOGIST PROVIDED HISTORY: Reason for exam:->altered mental status Has a \"code stroke\" or \"stroke alert\" been called? ->No What reading provider will be dictating this exam?->CRC FINDINGS: BRAIN/VENTRICLES: There is no acute intracranial hemorrhage, mass effect or midline shift. No abnormal extra-axial fluid collection. The gray-white differentiation is maintained without evidence of an acute infarct. There is no evidence of hydrocephalus. Severe low attenuation in the periventricular deep white matter of severe chronic small vessel ischemic disease along with mild-to-moderate generalized atrophy and prominence of the sulci ORBITS: The visualized portion of the orbits demonstrate no acute abnormality. SINUSES: The visualized paranasal sinuses and mastoid air cells demonstrate no acute abnormality. SOFT TISSUES/SKULL:  No acute abnormality of the visualized skull or soft tissues. No acute intracranial abnormality.  Severely advanced chronic small vessel ischemic disease along with cerebral atrophy noted     XR CHEST PORTABLE    Result Date: 12/3/2022  EXAMINATION: ONE XRAY VIEW OF THE CHEST 12/3/2022 12:42 pm COMPARISON: Chest x-ray 12/02/2022 HISTORY: ORDERING SYSTEM PROVIDED HISTORY: cough TECHNOLOGIST PROVIDED HISTORY: Reason for exam:->cough What reading provider will be dictating this exam?->CRC FINDINGS: Cardiac size within normal limits. Pulmonary vascularity within normal limits. No focal opacification or consolidation. No pneumothorax or pleural effusion. Status post median sternotomy     No acute cardiopulmonary process     XR CHEST PORTABLE    Result Date: 12/2/2022  EXAMINATION: ONE XRAY VIEW OF THE CHEST 12/2/2022 12:42 pm COMPARISON: 11/23/2022. HISTORY: ORDERING SYSTEM PROVIDED HISTORY:  TECHNOLOGIST PROVIDED HISTORY: Reason for exam:->fu What reading provider will be dictating this exam?->CRC FINDINGS: The patient is status post sternotomy. The cardiac silhouette is normal in size. There are chronic findings in both lungs. No pulmonary consolidation or collapse is identified. No pneumothorax or pleural effusion is seen. The thoracic spine demonstrates degenerative changes. Chronic findings. No acute cardiopulmonary disease. XR CHEST 1 VIEW    Result Date: 11/24/2022  EXAMINATION: ONE XRAY VIEW OF THE CHEST11/23/2022 TECHNIQUE: Frontal view submitted COMPARISON: None. HISTORY: ORDERING SYSTEM PROVIDED HISTORY: pre-operative planning and medical clearance TECHNOLOGIST PROVIDED HISTORY: Reason for exam:->pre-operative planning and medical clearance What reading provider will be dictating this exam?->CRC FINDINGS: The lungs are clear without focal consolidation, large pleural effusion, or pneumothorax. The cardiomediastinal silhouette is stable. No acute cardiopulmonary process. CT FEMUR RIGHT WO CONTRAST    Result Date: 11/23/2022  EXAMINATION: CT OF THE RIGHT FEMUR WITH CONTRAST 11/23/2022 10:13 pm TECHNIQUE: CT of the right femur was performed with the administration of intravenous contrast.  Multiplanar reformatted images are provided for review.  Automated exposure control, iterative reconstruction, and/or weight based adjustment of the mA/kV was utilized to reduce the radiation dose to as low as reasonably achievable. COMPARISON: Radiographs today HISTORY ORDERING SYSTEM PROVIDED HISTORY: pre op planning TECHNOLOGIST PROVIDED HISTORY: Reason for exam:->pre op planning Decision Support Exception - unselect if not a suspected or confirmed emergency medical condition->Emergency Medical Condition (MA) What reading provider will be dictating this exam?->CRC FINDINGS: Bones: Displaced supracondylar femoral fracture is present, comminuted, some impaction and angulation present. The adjacent knee replacement causes artifact to limit evaluation. Fracture appears to reach the upper margin of the lateral component of the femoral implant. Bone mineral density appears low. Soft Tissue: Soft tissue swelling is present in the deep tissues including hemorrhage to the mid shaft level from the knee. There prominent atherosclerotic calcifications Dependent bladder calculus is 2.1 cm. Tiny relative calculus of each side of the bladder also present could represent calculi possibility of a wall lesion with calcification cannot be excluded however. Possible constipation, diverticulosis coli. Joint: Hip and knee replacements. 1. Supracondylar region femoral fracture with displacement, immediately cephalad to the knee replacement. 2. Further details and incidental findings are described above. US RETROPERITONEAL COMPLETE    Result Date: 12/2/2022  EXAMINATION: RETROPERITONEAL ULTRASOUND OF THE KIDNEYS AND URINARY BLADDER 12/2/2022 COMPARISON: None HISTORY: ORDERING SYSTEM PROVIDED HISTORY: stacy TECHNOLOGIST PROVIDED HISTORY: Reason for exam:->stacy What reading provider will be dictating this exam?->CRC FINDINGS: Kidneys: The right kidney demonstrates unremarkable echogenicity and unremarkable vascularity. No evidence of right hydronephrosis is seen. A cyst is visualized measuring 1.4 x 1.2 x 1.6 cm. No evidence of solid right renal mass is seen.   No evidence of hyperechoic stones is seen. The right kidney measures 11.5 x 5.7 x 5.7 cm. The right renal cortex measures 1.0 cm. The left kidney demonstrates unremarkable echogenicity and unremarkable vascularity. No evidence of left hydronephrosis is seen. No evidence of solid left renal mass is seen. No evidence of hyperechoic stones is seen. The left kidney measures 10.9 x 4.5 x 7.2 cm. The left renal cortex measures 1.0 cm. No evidence of free fluid is seen. Bladder: Unremarkable appearance of the bladder. Prevoid urinary bladder volume measures 339.5 cc. Ureteral jets were not visualized. A hyperechoic stone is visualized in the dependent portion of the urinary bladder measuring 2.7 cm. No evidence of hydronephrosis is seen. 2.7 cm hyperechoic stone visualized within the urinary bladder. FLUORO FOR SURGICAL PROCEDURES    Result Date: 11/25/2022  EXAMINATION: SPOT FLUOROSCOPIC IMAGES 11/25/2022 12:12 pm TECHNIQUE: Fluoroscopy was provided by the radiology department for procedure. Radiologist was not present during examination. FLUOROSCOPY DOSE AND TYPE OR TIME AND EXPOSURES: Fluoroscopy time equals 86.5 seconds. Total dose equals 18.13 mGy COMPARISON: None HISTORY: ORDERING SYSTEM PROVIDED HISTORY: ORIF rt.femur TECHNOLOGIST PROVIDED HISTORY: Reason for exam:->ORIF rt.femur What reading provider will be dictating this exam?->CRC Intraprocedural imaging. FINDINGS: 8 spot images of the femur were obtained. Intraprocedural fluoroscopic spot images as above. See separate procedure report for more information.        Discharge Medications:      Medication List        START taking these medications      aspirin 325 MG EC tablet  Take 1 tablet by mouth 2 times daily  Replaces: aspirin 325 MG tablet            STOP taking these medications      acetaminophen 325 MG tablet  Commonly known as: TYLENOL     aspirin 325 MG tablet  Replaced by: aspirin 325 MG EC tablet     carvedilol 12.5 MG tablet  Commonly known as: COREG     furosemide 20 MG tablet  Commonly known as: LASIX     lamoTRIgine 200 MG tablet  Commonly known as: LAMICTAL     lisinopril 5 MG tablet  Commonly known as: PRINIVIL;ZESTRIL     omeprazole 20 MG delayed release capsule  Commonly known as: PRILOSEC     Percocet 5-325 MG per tablet  Generic drug: oxyCODONE-acetaminophen     potassium chloride 10 MEQ extended release tablet  Commonly known as: KLOR-CON M     sulfamethoxazole-trimethoprim 800-160 MG per tablet  Commonly known as: BACTRIM DS;SEPTRA DS     venlafaxine 50 MG tablet  Commonly known as: EFFEXOR     Vibegron 76 MG Tabs            ASK your doctor about these medications      oxyCODONE-acetaminophen 5-325 MG per tablet  Commonly known as: Percocet  Take 1 tablet by mouth every 6 hours as needed for Pain for up to 7 days. Intended supply: 7 days. Take lowest dose possible to manage pain  Ask about: Should I take this medication? Where to Get Your Medications        You can get these medications from any pharmacy    Bring a paper prescription for each of these medications  aspirin 325 MG EC tablet  oxyCODONE-acetaminophen 5-325 MG per tablet         Time Spent on discharge is more than 35 minutes in the examination, evaluation, counseling and review of medications and discharge plan.    +++++++++++++++++++++++++++++++++++++++++++++++++  Sobeida Sandoval MD  [address]  +++++++++++++++++++++++++++++++++++++++++++++++++  NOTE: This report was transcribed using voice recognition software. Every effort was made to ensure accuracy; however, inadvertent computerized transcription errors may be present.

## 2022-12-05 NOTE — PLAN OF CARE
Problem: Discharge Planning  Goal: Discharge to home or other facility with appropriate resources  Outcome: Adequate for Discharge     Problem: Pain  Goal: Verbalizes/displays adequate comfort level or baseline comfort level  Outcome: Adequate for Discharge     Problem: Safety - Adult  Goal: Free from fall injury  Outcome: Adequate for Discharge     Problem: ABCDS Injury Assessment  Goal: Absence of physical injury  Outcome: Adequate for Discharge     Problem: Skin/Tissue Integrity  Goal: Absence of new skin breakdown  Description: 1. Monitor for areas of redness and/or skin breakdown  2. Assess vascular access sites hourly  3. Every 4-6 hours minimum:  Change oxygen saturation probe site  4. Every 4-6 hours:  If on nasal continuous positive airway pressure, respiratory therapy assess nares and determine need for appliance change or resting period.   Outcome: Adequate for Discharge     Problem: Nutrition Deficit:  Goal: Optimize nutritional status  Outcome: Adequate for Discharge

## 2022-12-05 NOTE — CARE COORDINATION
12/5/22 Update CM Note: Patient accepted private pay to [facility] under hospice of [facility]. [facility] ambulance to transport via stretcher with pickup time 1:30pm. Call placed to family and notified of discharge and pickup time. Hosp[facility] notified of time of discharge. [facility]/ambulance form/farideh/destination completed. Covid pending.  Electronically signed by George Reese RN CM on 12/5/2022 at 12:19 PM

## 2022-12-05 NOTE — PROGRESS NOTES
Pharmacy Consultation Note  (Antibiotic Dosing and Monitoring)    Initial consult date: 12/3/22  Consulting physician/provider: Chayo Franco  Drug: Vancomycin  Indication: Sepsis    Vancomycin has been discontinued. Clinical pharmacy will sign off, please reconsult if further assistance is needed.        Mario Doe, PharmD, BCPS, BCCCP 12/5/2022 8:00 AM

## 2022-12-05 NOTE — CARE COORDINATION
12/05/22 Update CM Note: Per the [id number]  the phones are not working and cannot be sent to the benefits office. Call placed to  at [facility] and message left requesting a return call. Will await return call from Kathy at [phone].  Electronically signed by Barbara Adam RN CM on 12/5/2022 at 9:48 AM

## 2022-12-05 NOTE — CARE COORDINATION
12/05/22 Update CM Note: Spoke with SW at [facility]. She states the patient would need approval for VA Benefits under hospice. Patient can only go to the two facilities covered by VA: [facility] and [facility]. A [employer] form would need completed and patient approved by Umair Duran for hospice benefit under Va. Call placed to patients wife, Kristine Coffman, to discuss above. Patients wife does not want the above facilities and states she will private pay for [facility]. She is pending a call from [facility] to discuss payment. Will follow and await call from [place] at [facility] admissions.  Electronically signed by Shellie Burnham RN CM on 12/5/2022 at 10:29 AM

## 2022-12-05 NOTE — CARE COORDINATION
22 Update CM Note: Patient on general medical floor from intensive care unit. [facility]. Prior authorization had been obtained for [facility] and has now . Plan per patiens wife, Kristine Coffman, is to go to [facility] under [address]. She is inquiring about his VA benefit paying the room and board. Call placed to [facility] Transfer center and patient is 20% service connected. Will call benefits and verify any hospice benefit patient may have. Call placed to [facility] central intake to verify that patient is now under hospice and wishes to go to [facility] under hospice. They will review and return determination to . Will await call from the [place] as well as call from Max Piña.  Electronically signed by Shellie Burnham RN CM on 2022 at 9:38 AM

## 2022-12-05 NOTE — PROGRESS NOTES
Report called to [facility] on [phone]. Patient's wife, Kaitlin Felix, called and notified of transfer. Transfer request put in.

## 2022-12-06 LAB
MYELOPEROXIDASE AB: 0 AU/ML (ref 0–19)
ORGANISM: ABNORMAL
SERINE PROTEASE 3 AB: 0 AU/ML (ref 0–19)
URINE CULTURE, ROUTINE: ABNORMAL

## 2022-12-08 LAB
BLOOD CULTURE, ROUTINE: NORMAL
CULTURE, BLOOD 2: NORMAL

## 2022-12-10 LAB
Lab: NORMAL
REPORT: NORMAL
THIS TEST SENT TO: NORMAL

## 2023-10-25 NOTE — CARE COORDINATION
12/2/2022social work transition of care planning  Pt plan is to [facility] pending(initiated 12/1). Electronically signed by LALA Jasmine on 12/2/2022 at 8:31 AM    Addendum:Javon notified that Jude John obtained, god thru Sunday 124. Sw placed pt on Sunday therapy list. Will need covid test day pf discharge. Per attending,pt not stable for discharge today.   Electronically signed by LALA Jasmine on 12/2/2022 at 3:26 PM Pt to pacu from OR. Pt awake but groggy at arrival, answers yes or no questions, falls back to sleep easily. On 2L via NC per SRNA, placed on monitor, VSS. Sites at abdo x6, closed with glue, clean dry and intact, no pain with palpation. Pt denies pain or nausea at this time, valencia in place, patent and draining. No signs of distress noted at this time, glucose 213. Clothes at bedside.

## 2024-11-25 NOTE — PROGRESS NOTES
Pt decided to take medicine for pin and this RN was able to also get pt to agre to take other due PO meds. Tried to call Pt unable to reach Pt or leave Vm, if Pt calls back ok for Hub to relay---His A1c is 6.4 that is the upper limit of prediabetes and if it goes any higher we will consider starting him on medication for diabetes.  I would strongly suggest that he reduce carbs and sweets in his diet and try to get regular exercise and lose some weight and I suspect this will improve.  Of his blood work was fine.  I will continue to try to reach Pt

## (undated) DEVICE — TUBING SUCT 12FR MAL ALUM SHFT FN CAP VENT UNIV CONN W/ OBT

## (undated) DEVICE — BIT DRL L145MM DIA3.2MM QUIK CPL W/O STP REUSE

## (undated) DEVICE — DRESSING HYDROFIBER AQUACEL AG ADVANTAGE 3.5X10 IN

## (undated) DEVICE — GLOVE ORANGE PI 8   MSG9080

## (undated) DEVICE — GOWN,BREATHABLE SLV,AURORA,XLG,STRL: Brand: MEDLINE

## (undated) DEVICE — Device

## (undated) DEVICE — C-ARMOR C-ARM EQUIPMENT COVERS CLEAR STERILE UNIVERSAL FIT 12 PER CASE: Brand: C-ARMOR

## (undated) DEVICE — PIN FIX L4.5MM S STL CERCLAGE THRD POS

## (undated) DEVICE — DRESSING HYDROFIBER AQUACEL AG ADVANTAGE 3.5X6 IN

## (undated) DEVICE — GLOVE ORTHO 8   MSG9480

## (undated) DEVICE — LOWER EXT HIP DRAPE: Brand: MEDLINE INDUSTRIES, INC.

## (undated) DEVICE — GUIDEWIRE ORTH L300MM DIA2.5MM CO CHROM DRL TIP FOR LCP